# Patient Record
Sex: MALE | Race: WHITE
[De-identification: names, ages, dates, MRNs, and addresses within clinical notes are randomized per-mention and may not be internally consistent; named-entity substitution may affect disease eponyms.]

---

## 2017-03-05 ENCOUNTER — HOSPITAL ENCOUNTER (EMERGENCY)
Dept: HOSPITAL 55 - ED | Age: 52
Discharge: HOME | DRG: 192 | End: 2017-03-05
Payer: COMMERCIAL

## 2017-03-05 VITALS — SYSTOLIC BLOOD PRESSURE: 119 MMHG | DIASTOLIC BLOOD PRESSURE: 83 MMHG

## 2017-03-05 VITALS — TEMPERATURE: 99.1 F | RESPIRATION RATE: 22 BRPM

## 2017-03-05 VITALS — OXYGEN SATURATION: 91 % | HEART RATE: 108 BPM

## 2017-03-05 DIAGNOSIS — Z72.0: ICD-10-CM

## 2017-03-05 DIAGNOSIS — J44.1: Primary | ICD-10-CM

## 2017-03-05 PROCEDURE — 71020: CPT

## 2017-03-05 PROCEDURE — 99284 EMERGENCY DEPT VISIT MOD MDM: CPT

## 2017-04-03 ENCOUNTER — HOSPITAL ENCOUNTER (EMERGENCY)
Dept: HOSPITAL 55 - ED | Age: 52
Discharge: ANOTHER HOSPITAL | DRG: 310 | End: 2017-04-03
Payer: COMMERCIAL

## 2017-04-03 VITALS — TEMPERATURE: 99.5 F

## 2017-04-03 VITALS — OXYGEN SATURATION: 93 % | HEART RATE: 150 BPM | DIASTOLIC BLOOD PRESSURE: 52 MMHG | SYSTOLIC BLOOD PRESSURE: 109 MMHG

## 2017-04-03 VITALS — RESPIRATION RATE: 28 BRPM

## 2017-04-03 DIAGNOSIS — I47.1: Primary | ICD-10-CM

## 2017-04-03 DIAGNOSIS — J44.9: ICD-10-CM

## 2017-04-03 DIAGNOSIS — N28.9: ICD-10-CM

## 2017-04-03 DIAGNOSIS — R09.02: ICD-10-CM

## 2017-04-03 LAB
ALBUMIN SERPL-MCNC: 2.7 GM/DL (ref 3.4–5)
ALT SERPL-CCNC: 34 IU/L (ref 14–63)
AMPHETAMINES UR-MCNC: NEGATIVE UG/L
APPEARANCE UR: (no result)
BASOPHILS NFR BLD AUTO: 2 % (ref 0–3)
BILIRUB UR QL STRIP: (no result)
CALCIUM SERPL-MCNC: 8.4 MG/DL (ref 8.5–10.1)
COLOR,URINE: (no result)
EOSINOPHIL NFR BLD AUTO: 0 % (ref 0–9)
GFR SERPLBLD BASED ON 1.73 SQ M-ARVRAT: 28 ML/MIN (ref 60–?)
GLUCOSE, URINE (UA): NEGATIVE
HCT VFR BLD CALC: 63 % (ref 39–53)
ICTOTEST,URINE: POSITIVE
KETONES UR QL STRIP: (no result)
LEUKOCYTE ESTERASE UR QL STRIP: (no result)
LEUKOCYTE ESTERASE UR QL STRIP: NEGATIVE
MCHC RBC AUTO-ENTMCNC: 30.4 GM/DL (ref 32–36)
MCV RBC AUTO: 96 FL (ref 80–100)
METHADONE SERPL-MCNC: NEGATIVE NG/ML
MONOCYTES NFR BLD AUTO: 20.7 % (ref 0–12)
NEUTROPHILS NFR BLD AUTO: 71 % (ref 37–80)
NITRATE,URINE: NEGATIVE
OPIATES SPEC QL: NEGATIVE
OXYCODONE(OXY): NEGATIVE
PH UR STRIP: 5 [PH]
POTASSIUM SERPL-SCNC: 5.4 MMOL/L (ref 3.5–5.1)
PROPOXYPHENE(PPX): NEGATIVE
RBC # UR STRIP: (no result) /UL
RBC UR QL: NEGATIVE
SODIUM SERPL-SCNC: 134 MMOL/L (ref 136–145)
SP GR UR STRIP: >1.03
TRICYCLICS SERPL-MCNC: NEGATIVE UG/L
UROBILINOGEN,URINE: 1

## 2017-04-03 PROCEDURE — 87088 URINE BACTERIA CULTURE: CPT

## 2017-04-03 PROCEDURE — 80305 DRUG TEST PRSMV DIR OPT OBS: CPT

## 2017-04-03 PROCEDURE — 96365 THER/PROPH/DIAG IV INF INIT: CPT

## 2017-04-03 PROCEDURE — 99285 EMERGENCY DEPT VISIT HI MDM: CPT

## 2017-04-03 PROCEDURE — 84484 ASSAY OF TROPONIN QUANT: CPT

## 2017-04-03 PROCEDURE — 85025 COMPLETE CBC W/AUTO DIFF WBC: CPT

## 2017-04-03 PROCEDURE — 96374 THER/PROPH/DIAG INJ IV PUSH: CPT

## 2017-04-03 PROCEDURE — 96375 TX/PRO/DX INJ NEW DRUG ADDON: CPT

## 2017-04-03 PROCEDURE — 81001 URINALYSIS AUTO W/SCOPE: CPT

## 2017-04-03 PROCEDURE — 83880 ASSAY OF NATRIURETIC PEPTIDE: CPT

## 2017-04-03 PROCEDURE — 93005 ELECTROCARDIOGRAM TRACING: CPT

## 2017-04-03 PROCEDURE — 96366 THER/PROPH/DIAG IV INF ADDON: CPT

## 2017-04-03 PROCEDURE — 80053 COMPREHEN METABOLIC PANEL: CPT

## 2017-04-03 PROCEDURE — 71010: CPT

## 2017-04-03 PROCEDURE — 99291 CRITICAL CARE FIRST HOUR: CPT

## 2018-01-01 ENCOUNTER — HOSPITAL ENCOUNTER (EMERGENCY)
Dept: HOSPITAL 55 - ED | Age: 53
Discharge: HOME | DRG: 192 | End: 2018-01-01
Payer: COMMERCIAL

## 2018-01-01 VITALS — SYSTOLIC BLOOD PRESSURE: 119 MMHG | TEMPERATURE: 98.7 F | DIASTOLIC BLOOD PRESSURE: 77 MMHG

## 2018-01-01 VITALS — OXYGEN SATURATION: 89 % | RESPIRATION RATE: 22 BRPM | HEART RATE: 110 BPM

## 2018-01-01 DIAGNOSIS — R33.9: ICD-10-CM

## 2018-01-01 DIAGNOSIS — J44.1: Primary | ICD-10-CM

## 2018-01-01 LAB
BILIRUB UR QL STRIP: NEGATIVE
GLUCOSE, URINE (UA): NEGATIVE
KETONES UR QL STRIP: NEGATIVE
LEUKOCYTE ESTERASE UR QL STRIP: (no result)
LEUKOCYTE ESTERASE UR QL STRIP: NEGATIVE
NITRATE,URINE: NEGATIVE
PH UR STRIP: 5 [PH]
RBC # UR STRIP: (no result) /UL
RBC UR QL: (no result)
SP GR UR STRIP: 1.02
UROBILINOGEN,URINE: 0.2

## 2018-01-01 PROCEDURE — 99283 EMERGENCY DEPT VISIT LOW MDM: CPT

## 2018-01-01 PROCEDURE — 81001 URINALYSIS AUTO W/SCOPE: CPT

## 2018-01-21 ENCOUNTER — HOSPITAL ENCOUNTER (EMERGENCY)
Dept: HOSPITAL 55 - ED | Age: 53
Discharge: ANOTHER HOSPITAL | DRG: 310 | End: 2018-01-21
Payer: COMMERCIAL

## 2018-01-21 VITALS
RESPIRATION RATE: 18 BRPM | OXYGEN SATURATION: 91 % | HEART RATE: 159 BPM | DIASTOLIC BLOOD PRESSURE: 61 MMHG | SYSTOLIC BLOOD PRESSURE: 89 MMHG

## 2018-01-21 VITALS — TEMPERATURE: 97 F

## 2018-01-21 DIAGNOSIS — I95.9: ICD-10-CM

## 2018-01-21 DIAGNOSIS — I47.1: Primary | ICD-10-CM

## 2018-01-21 LAB
ALBUMIN SERPL-MCNC: 2.6 GM/DL (ref 3.4–5)
ALT SERPL-CCNC: 36 IU/L (ref 14–63)
BASOPHILS NFR BLD AUTO: 1 % (ref 0–3)
CALCIUM SERPL-MCNC: 8.4 MG/DL (ref 8.5–10.1)
EOSINOPHIL NFR BLD AUTO: 1 % (ref 0–9)
GFR SERPLBLD BASED ON 1.73 SQ M-ARVRAT: 69 ML/MIN (ref 60–?)
HCT VFR BLD CALC: 63 % (ref 39–53)
MCHC RBC AUTO-ENTMCNC: 29.8 GM/DL (ref 32–36)
MCV RBC AUTO: 97 FL (ref 80–100)
MONOCYTES NFR BLD AUTO: 15.8 % (ref 0–12)
NEUTROPHILS NFR BLD AUTO: 71 % (ref 37–80)
POTASSIUM SERPL-SCNC: 4.8 MMOL/L (ref 3.5–5.1)
SODIUM SERPL-SCNC: 139 MMOL/L (ref 136–145)

## 2018-01-21 PROCEDURE — 93005 ELECTROCARDIOGRAM TRACING: CPT

## 2018-01-21 PROCEDURE — 85025 COMPLETE CBC W/AUTO DIFF WBC: CPT

## 2018-01-21 PROCEDURE — 84484 ASSAY OF TROPONIN QUANT: CPT

## 2018-01-21 PROCEDURE — 71045 X-RAY EXAM CHEST 1 VIEW: CPT

## 2018-01-21 PROCEDURE — 83880 ASSAY OF NATRIURETIC PEPTIDE: CPT

## 2018-01-21 PROCEDURE — 99291 CRITICAL CARE FIRST HOUR: CPT

## 2018-01-21 PROCEDURE — 80053 COMPREHEN METABOLIC PANEL: CPT

## 2018-08-02 ENCOUNTER — NURSE TRIAGE (OUTPATIENT)
Dept: NURSING | Facility: CLINIC | Age: 53
End: 2018-08-02

## 2018-08-03 ENCOUNTER — OFFICE VISIT (OUTPATIENT)
Dept: URGENT CARE | Facility: URGENT CARE | Age: 53
End: 2018-08-03
Payer: COMMERCIAL

## 2018-08-03 VITALS
DIASTOLIC BLOOD PRESSURE: 81 MMHG | WEIGHT: 143 LBS | TEMPERATURE: 97.5 F | SYSTOLIC BLOOD PRESSURE: 116 MMHG | OXYGEN SATURATION: 89 % | RESPIRATION RATE: 18 BRPM | HEART RATE: 110 BPM

## 2018-08-03 DIAGNOSIS — R06.02 SOB (SHORTNESS OF BREATH): Primary | ICD-10-CM

## 2018-08-03 PROCEDURE — 99203 OFFICE O/P NEW LOW 30 MIN: CPT | Mod: 25 | Performed by: FAMILY MEDICINE

## 2018-08-03 PROCEDURE — 94640 AIRWAY INHALATION TREATMENT: CPT | Performed by: FAMILY MEDICINE

## 2018-08-03 RX ORDER — AZITHROMYCIN 250 MG/1
TABLET, FILM COATED ORAL
Qty: 6 TABLET | Refills: 0 | Status: SHIPPED | OUTPATIENT
Start: 2018-08-03 | End: 2018-08-03

## 2018-08-03 RX ORDER — TIOTROPIUM BROMIDE 18 UG/1
CAPSULE ORAL; RESPIRATORY (INHALATION)
COMMUNITY
Start: 2015-12-23 | End: 2021-03-24

## 2018-08-03 RX ORDER — ALBUTEROL SULFATE 90 UG/1
AEROSOL, METERED RESPIRATORY (INHALATION)
COMMUNITY
Start: 2015-12-23

## 2018-08-03 RX ORDER — LISINOPRIL 10 MG/1
10 TABLET ORAL
COMMUNITY
Start: 2017-04-17

## 2018-08-03 RX ORDER — AZITHROMYCIN 250 MG/1
TABLET, FILM COATED ORAL
Qty: 6 TABLET | Refills: 0 | Status: SHIPPED | OUTPATIENT
Start: 2018-08-03

## 2018-08-03 RX ORDER — ATORVASTATIN CALCIUM 20 MG/1
20 TABLET, FILM COATED ORAL
COMMUNITY
Start: 2015-08-20

## 2018-08-03 RX ORDER — PREDNISONE 20 MG/1
40 TABLET ORAL DAILY
Qty: 10 TABLET | Refills: 0 | Status: SHIPPED | OUTPATIENT
Start: 2018-08-03 | End: 2018-08-08

## 2018-08-03 NOTE — TELEPHONE ENCOUNTER
Says he is visiting from out of town and does not have enough with him. I advised that he contact the company that provides his oxygen. He verbalized understanding..  Juani Shea RN  [facility]

## 2018-08-03 NOTE — PROGRESS NOTES
SUBJECTIVE:   Miguel Rivera is a 53 year old male presenting with a chief complaint of   Chief Complaint   Patient presents with     Urgent Care     Cold Symptoms     congestion, some sob, nausea, tiredness x1 week visiting from out of town, needs refill on some medications      The history of COPD seems to be getting worse over the last 4 days using his medication more.  No fever no chills but there is shortness of breath    Difficult to walk without some shortness of breath  He is a new patient of [facility].    URI Adult    Onset of symptoms was 1 week(s) ago.  Course of illness is worsening.    Severity moderate  Current and Associated symptoms: fever and chills  Treatment measures tried include Tylenol/Ibuprofen.  Predisposing factors include None.        Review of Systems   Respiratory: Positive for cough and shortness of breath.    All other systems reviewed and are negative.      No past medical history on file.  No family history on file.  Current Outpatient Prescriptions   Medication Sig Dispense Refill     albuterol (VENTOLIN HFA) 108 (90 Base) MCG/ACT Inhaler INHALE TWO PUFFS BY MOUTH EVERY 4 HOURS AS NEEDED FOR WHEEZING       atorvastatin (LIPITOR) 20 MG tablet Take 20 mg by mouth       Budesonide-Formoterol Fumarate (SYMBICORT IN)        fluticasone-salmeterol (ADVAIR DISKUS) 100-50 MCG/DOSE diskus inhaler Inhale 1 puff into the lungs every 12 hours       Furosemide (LASIX PO)        lisinopril (PRINIVIL/ZESTRIL) 10 MG tablet Take 10 mg by mouth       metFORMIN (GLUCOPHAGE) 500 MG tablet Take 500 mg by mouth       mometasone-formoterol (DULERA) 200-5 MCG/ACT oral inhaler Inhale two puffs twice a day- RINSE MOUTH after DOSE.       tiotropium (SPIRIVA HANDIHALER) 18 MCG capsule INHALE ONE DOSE BY MOUTH ONCE DAILY       Social History   Substance Use Topics     Smoking status: Current Every Day Smoker     Packs/day: 1.00     Types: Cigarettes     Smokeless tobacco: Never Used     Alcohol use Not on file        OBJECTIVE  /81  Pulse 110  Temp 97.5  F (36.4  C) (Oral)  Resp 18  Wt 143 lb (64.9 kg)  SpO2 (!) 89%    Physical Exam   Constitutional: He is oriented to person, place, and time.   HENT:   Head: Normocephalic.   Eyes: EOM are normal. Pupils are equal, round, and reactive to light.   Cardiovascular: Normal rate.    Pulmonary/Chest: Effort normal.   Shortness of breath and low oxygen tension.   Abdominal: Soft.   Musculoskeletal: Normal range of motion.   Neurological: He is alert and oriented to person, place, and time.   Skin: Skin is warm.   Psychiatric: He has a normal mood and affect.   Nursing note and vitals reviewed.      Labs:  No results found for this or any previous visit (from the past 24 hour(s)).    X-Ray was not done.    ASSESSMENT:      ICD-10-CM    1. SOB (shortness of breath) R06.02 INHALATION/NEBULIZER TREATMENT, INITIAL    This appears to be exacerbation of COPD.Oxygen saturations are low.     He did seem to improve after nebulizing treatments his O2 saturations were greater than 90%.          Medical Decision Making:    Differential Diagnosis:  IsURI Adult/Peds:  Bronchospasm, COPD, pneumonia , bronchitis    Serious Comorbid Conditions:   Adult:  COPD.        PLAN:    URI Adult:  Tylenol and Ibuprofen    Followup:    Should be seen by his primary care within the next week    sooner if not improving  There are no Patient Instructions on file for this visit.

## 2018-08-03 NOTE — NURSING NOTE
The following nebulizer treatment was given:     MEDICATION: Duoneb  : [employer]  LOT #: [id number]  EXPIRATION DATE:  10/2019  NDC # 3032-0913-45     Nebulizer Start Time:  410  Nebulizer Stop Time:  425  .Yolie ELIZABETH MA    See Vital Signs Flowsheet

## 2018-08-03 NOTE — MR AVS SNAPSHOT
"              After Visit Summary   8/3/2018    Miguel Rivera    MRN: [id number]           Patient Information     Date Of Birth          1965        Visit Information        Provider Department      8/3/2018 3:20 PM Albert Fofana MD [facility]        Today's Diagnoses     SOB (shortness of breath)    -  1       Follow-ups after your visit        Who to contact     If you have questions or need follow up information about today's clinic visit or your schedule please contact [facility] directly at [phone].  Normal or non-critical lab and imaging results will be communicated to you by [employer], letter or phone within 4 business days after the clinic has received the results. If you do not hear from us within 7 days, please contact the clinic through [employer] or phone. If you have a critical or abnormal lab result, we will notify you by phone as soon as possible.  Submit refill requests through [employer] or call your pharmacy and they will forward the refill request to us. Please allow 3 business days for your refill to be completed.          Additional Information About Your Visit        MyChart Information     [employer] lets you send messages to your doctor, view your test results, renew your prescriptions, schedule appointments and more. To sign up, go to [email or web address] . Click on \"Log in\" on the left side of the screen, which will take you to the Welcome page. Then click on \"Sign up Now\" on the right side of the page.     You will be asked to enter the access code listed below, as well as some personal information. Please follow the directions to create your username and password.     Your access code is: [email or web address]  Expires: 2018  8:30 PM     Your access code will  in 90 days. If you need help or a new code, please call your [facility] clinic or [phone].        Care EveryWhere ID     This is your Care EveryWhere ID. This " could be used by other organizations to access your [place] medical records  [id number]        Your Vitals Were     Pulse Temperature Respirations Pulse Oximetry          110 97.5  F (36.4  C) (Oral) 18 89%         Blood Pressure from Last 3 Encounters:   08/03/18 116/81   03/11/14 140/90    Weight from Last 3 Encounters:   08/03/18 143 lb (64.9 kg)   03/11/14 165 lb (74.8 kg)              We Performed the Following     INHALATION/NEBULIZER TREATMENT, INITIAL          Today's Medication Changes          These changes are accurate as of 8/3/18 11:59 PM.  If you have any questions, ask your nurse or doctor.               Start taking these medicines.        Dose/Directions    azithromycin 250 MG tablet   Commonly known as:  ZITHROMAX   Used for:  SOB (shortness of breath)   Started by:  Albert Fofana MD        Two tablets first day, then one tablet daily for four days.   Quantity:  6 tablet   Refills:  0       predniSONE 20 MG tablet   Commonly known as:  DELTASONE   Used for:  SOB (shortness of breath)   Started by:  Albert Fofana MD        Dose:  40 mg   Take 2 tablets (40 mg) by mouth daily for 5 days   Quantity:  10 tablet   Refills:  0            Where to get your medicines      These medications were sent to [facility] [address] [place] [address]     Phone:  [phone]     azithromycin 250 MG tablet    predniSONE 20 MG tablet                Primary Care Provider Fax #    Physician No Ref-Primary [phone]       No address on file        Equal Access to Services     BRISEYDA ALVARADO : Dania Kiran, waaxda luqadaha, qaybta kaalmada adeegyada, waxgavin jonathan diaz. So [facility] [phone].    ATENCIÓN: Si habla español, tiene a galvez disposición servicios gratuitos de asistencia lingüística. Llame al [phone].    We comply with applicable federal civil rights laws and [place] laws. We do not  discriminate on the basis of race, color, national origin, age, disability, sex, sexual orientation, or gender identity.            Thank you!     Thank you for choosing [facility]  for your care. Our goal is always to provide you with excellent care. Hearing back from our patients is one way we can continue to improve our services. Please take a few minutes to complete the written survey that you may receive in the mail after your visit with us. Thank you!             Your Updated Medication List - Protect others around you: Learn how to safely use, store and throw away your medicines at www.disposemymeds.org.          This list is accurate as of 8/3/18 11:59 PM.  Always use your most recent med list.                   Brand Name Dispense Instructions for use Diagnosis    ADVAIR DISKUS 100-50 MCG/DOSE diskus inhaler   Generic drug:  fluticasone-salmeterol      Inhale 1 puff into the lungs every 12 hours        atorvastatin 20 MG tablet    LIPITOR     Take 20 mg by mouth        azithromycin 250 MG tablet    ZITHROMAX    6 tablet    Two tablets first day, then one tablet daily for four days.    SOB (shortness of breath)       LASIX PO           lisinopril 10 MG tablet    PRINIVIL/ZESTRIL     Take 10 mg by mouth        metFORMIN 500 MG tablet    GLUCOPHAGE     Take 500 mg by mouth        mometasone-formoterol 200-5 MCG/ACT oral inhaler    DULERA     Inhale two puffs twice a day- RINSE MOUTH after DOSE.        predniSONE 20 MG tablet    DELTASONE    10 tablet    Take 2 tablets (40 mg) by mouth daily for 5 days    SOB (shortness of breath)       SPIRIVA HANDIHALER 18 MCG capsule   Generic drug:  tiotropium      INHALE ONE DOSE BY MOUTH ONCE DAILY        SYMBICORT IN           VENTOLIN  (90 Base) MCG/ACT inhaler   Generic drug:  albuterol      INHALE TWO PUFFS BY MOUTH EVERY 4 HOURS AS NEEDED FOR WHEEZING

## 2018-08-16 ASSESSMENT — ENCOUNTER SYMPTOMS
COUGH: 1
SHORTNESS OF BREATH: 1

## 2019-01-12 ENCOUNTER — HOSPITAL ENCOUNTER (EMERGENCY)
Dept: HOSPITAL 55 - ED | Age: 54
Discharge: ANOTHER HOSPITAL | DRG: 191 | End: 2019-01-12
Payer: COMMERCIAL

## 2019-01-12 VITALS
SYSTOLIC BLOOD PRESSURE: 105 MMHG | DIASTOLIC BLOOD PRESSURE: 70 MMHG | RESPIRATION RATE: 19 BRPM | OXYGEN SATURATION: 87 % | HEART RATE: 100 BPM

## 2019-01-12 VITALS — TEMPERATURE: 96.8 F

## 2019-01-12 DIAGNOSIS — E11.9: ICD-10-CM

## 2019-01-12 DIAGNOSIS — J44.1: Primary | ICD-10-CM

## 2019-01-12 DIAGNOSIS — R06.02: ICD-10-CM

## 2019-01-12 DIAGNOSIS — E87.2: ICD-10-CM

## 2019-01-12 LAB
ALBUMIN SERPL-MCNC: 3.1 GM/DL (ref 3.4–5)
ALCOHOL: 0.01 GM/DL (ref 0–0.08)
ALP SERPL-CCNC: 88 IU/L (ref 46–116)
ALT SERPL-CCNC: 42 IU/L (ref 14–63)
ANISOCYTOSIS BLD QL SMEAR: (no result)
AST SERPL-CCNC: 27 IU/L (ref 15–37)
BASOPHILS NFR BLD AUTO: 3 % (ref 0–3)
BILIRUB SERPL-MCNC: 0.7 MG/DL (ref 0.2–1)
BUN SERPL-MCNC: 31 MG/DL (ref 7–18)
CALCIUM SERPL-MCNC: 8.7 MG/DL (ref 8.5–10.1)
CHLORIDE SERPL-SCNC: 93 MMOL/L (ref 98–107)
CO2 BLDA CALC-SCNC: > 50 MEQ/L (ref 23–30)
CO2 BLDA CALC-SCNC: > 50 MEQ/L (ref 23–30)
CO2 SERPL-SCNC: 49 MEQ/L (ref 21–32)
CREAT SERPL-MCNC: 0.83 MG/DL (ref 0.8–1.3)
EOSINOPHIL NFR BLD AUTO: 0 % (ref 0–9)
GLUCOSE SERPL-MCNC: 156 MG/DL (ref 74–106)
HCO3 BLDA-SCNC: 48 MEQ/L (ref 23–30)
HCO3 BLDA-SCNC: 49 MEQ/L (ref 23–30)
HCT VFR BLD CALC: 69 % (ref 39–53)
HGB BLD-MCNC: 19.5 GM/DL (ref 13.5–17.7)
LYMPHOCYTES NFR BLD AUTO: 7.1 % (ref 10–50)
MACROCYTES BLD QL SMEAR: PRESENT
MCH RBC QN AUTO: 30 PG (ref 27–32)
MCHC RBC AUTO-ENTMCNC: 28.3 GM/DL (ref 32–36)
MCV RBC AUTO: 106 FL (ref 80–100)
MONOCYTES NFR BLD AUTO: 16.9 % (ref 0–12)
NEUTROPHILS NFR BLD AUTO: 73.1 % (ref 37–80)
PCO2 BLDA: 125 MMHG (ref 35–45)
PCO2 BLDA: 133 MMHG (ref 35–45)
PH BLDA: 7.17 [PH] (ref 7.35–7.45)
PH BLDA: 7.19 [PH] (ref 7.35–7.45)
PO2 BLDA: 100 MMHG (ref 80–100)
PO2 BLDA: 65 MMHG (ref 80–100)
POTASSIUM SERPL-SCNC: 4.9 MMOL/L (ref 3.5–5.1)
PROT SERPL-MCNC: 6.6 GM/DL (ref 6.4–8.2)
SAO2 % BLDA FROM PO2: 84 % (ref 96–100)
SAO2 % BLDA FROM PO2: 94 % (ref 96–100)
SODIUM SERPL-SCNC: 139 MMOL/L (ref 136–145)
STOMATOCYTES BLD QL SMEAR: PRESENT
TROPONIN I SERPL-MCNC: < 0.017 NG/ML (ref 0–0.06)

## 2019-01-12 PROCEDURE — 96365 THER/PROPH/DIAG IV INF INIT: CPT

## 2019-01-12 PROCEDURE — 36600 WITHDRAWAL OF ARTERIAL BLOOD: CPT

## 2019-01-12 PROCEDURE — 80053 COMPREHEN METABOLIC PANEL: CPT

## 2019-01-12 PROCEDURE — 80307 DRUG TEST PRSMV CHEM ANLYZR: CPT

## 2019-01-12 PROCEDURE — 82803 BLOOD GASES ANY COMBINATION: CPT

## 2019-01-12 PROCEDURE — 93005 ELECTROCARDIOGRAM TRACING: CPT

## 2019-01-12 PROCEDURE — 71045 X-RAY EXAM CHEST 1 VIEW: CPT

## 2019-01-12 PROCEDURE — 85025 COMPLETE CBC W/AUTO DIFF WBC: CPT

## 2019-01-12 PROCEDURE — 96374 THER/PROPH/DIAG INJ IV PUSH: CPT

## 2019-01-12 PROCEDURE — 99291 CRITICAL CARE FIRST HOUR: CPT

## 2019-01-12 PROCEDURE — 99284 EMERGENCY DEPT VISIT MOD MDM: CPT

## 2019-01-12 PROCEDURE — 84484 ASSAY OF TROPONIN QUANT: CPT

## 2019-01-12 PROCEDURE — 83880 ASSAY OF NATRIURETIC PEPTIDE: CPT

## 2019-06-04 ENCOUNTER — HOSPITAL ENCOUNTER (INPATIENT)
Dept: HOSPITAL 55 - ED | Age: 54
LOS: 1 days | Discharge: ANOTHER HOSPITAL | DRG: 191 | End: 2019-06-05
Attending: FAMILY MEDICINE | Admitting: FAMILY MEDICINE
Payer: COMMERCIAL

## 2019-06-04 DIAGNOSIS — E87.2: ICD-10-CM

## 2019-06-04 DIAGNOSIS — R06.02: ICD-10-CM

## 2019-06-04 DIAGNOSIS — J44.1: Primary | ICD-10-CM

## 2019-06-04 DIAGNOSIS — E11.9: ICD-10-CM

## 2019-06-04 LAB
ALBUMIN SERPL-MCNC: 3.2 GM/DL (ref 3.4–5)
ALP SERPL-CCNC: 86 IU/L (ref 46–116)
ALT SERPL-CCNC: 44 IU/L (ref 14–63)
ANISOCYTOSIS BLD QL SMEAR: (no result)
AST SERPL-CCNC: 24 IU/L (ref 15–37)
BASOPHILS NFR BLD MANUAL: 0 % (ref 0–3)
BILIRUB SERPL-MCNC: 0.5 MG/DL (ref 0.2–1)
BUN SERPL-MCNC: 28 MG/DL (ref 7–18)
CALCIUM SERPL-MCNC: 9.1 MG/DL (ref 8.5–10.1)
CHLORIDE SERPL-SCNC: 93 MMOL/L (ref 98–107)
CO2 BLDA CALC-SCNC: 47 MEQ/L (ref 23–30)
CO2 SERPL-SCNC: 44 MEQ/L (ref 21–32)
CREAT SERPL-MCNC: 0.52 MG/DL (ref 0.8–1.3)
EOSINOPHIL NFR BLD MANUAL: 0 % (ref 0–9)
GLUCOSE SERPL-MCNC: 125 MG/DL (ref 74–106)
HCO3 BLDA-SCNC: 44 MEQ/L (ref 23–30)
HCT VFR BLD CALC: 65 % (ref 39–53)
HGB BLD-MCNC: 19.7 GM/DL (ref 13.5–17.7)
MACROCYTES BLD QL SMEAR: PRESENT
MCH RBC QN AUTO: 31.1 PG (ref 27–32)
MCHC RBC AUTO-ENTMCNC: 30.4 GM/DL (ref 32–36)
MCV RBC AUTO: 102 FL (ref 80–100)
MONOCYTES % (MANUAL): 9 % (ref 0–12)
NEUTS BAND NFR BLD MANUAL: 0 %
NEUTS BAND NFR BLD MANUAL: 89 % (ref 37–80)
PCO2 BLDA: 102 MMHG (ref 35–45)
PH BLDA: 7.24 [PH] (ref 7.35–7.45)
PO2 BLDA: 61 MMHG (ref 80–100)
POTASSIUM SERPL-SCNC: 4.8 MMOL/L (ref 3.5–5.1)
PROT SERPL-MCNC: 7.1 GM/DL (ref 6.4–8.2)
SAO2 % BLDA FROM PO2: 84 % (ref 96–100)
SODIUM SERPL-SCNC: 135 MMOL/L (ref 136–145)
TROPONIN I SERPL-MCNC: < 0.017 NG/ML (ref 0–0.06)
VARIANT LYMPHS NFR BLD MANUAL: 2 % (ref 10–50)

## 2019-06-04 PROCEDURE — 94762 N-INVAS EAR/PLS OXIMTRY CONT: CPT

## 2019-06-04 PROCEDURE — 85025 COMPLETE CBC W/AUTO DIFF WBC: CPT

## 2019-06-04 PROCEDURE — 82803 BLOOD GASES ANY COMBINATION: CPT

## 2019-06-04 PROCEDURE — 99285 EMERGENCY DEPT VISIT HI MDM: CPT

## 2019-06-04 PROCEDURE — 0CHY7BZ INSERTION OF AIRWAY INTO MOUTH AND THROAT, VIA NATURAL OR ARTIFICIAL OPENING: ICD-10-PCS

## 2019-06-04 PROCEDURE — 84484 ASSAY OF TROPONIN QUANT: CPT

## 2019-06-04 PROCEDURE — 85007 BL SMEAR W/DIFF WBC COUNT: CPT

## 2019-06-04 PROCEDURE — 85027 COMPLETE CBC AUTOMATED: CPT

## 2019-06-04 PROCEDURE — 80048 BASIC METABOLIC PNL TOTAL CA: CPT

## 2019-06-04 PROCEDURE — 31500 INSERT EMERGENCY AIRWAY: CPT

## 2019-06-04 PROCEDURE — 71045 X-RAY EXAM CHEST 1 VIEW: CPT

## 2019-06-04 PROCEDURE — 99222 1ST HOSP IP/OBS MODERATE 55: CPT

## 2019-06-04 PROCEDURE — 82962 GLUCOSE BLOOD TEST: CPT

## 2019-06-04 PROCEDURE — 80053 COMPREHEN METABOLIC PANEL: CPT

## 2019-06-04 PROCEDURE — 93012: CPT

## 2019-06-04 PROCEDURE — 94640 AIRWAY INHALATION TREATMENT: CPT

## 2019-06-04 PROCEDURE — 94660 CPAP INITIATION&MGMT: CPT

## 2019-06-04 PROCEDURE — 93005 ELECTROCARDIOGRAM TRACING: CPT

## 2019-06-04 PROCEDURE — A9270 NON-COVERED ITEM OR SERVICE: HCPCS

## 2019-06-04 PROCEDURE — 36600 WITHDRAWAL OF ARTERIAL BLOOD: CPT

## 2019-06-04 PROCEDURE — 36415 COLL VENOUS BLD VENIPUNCTURE: CPT

## 2019-06-05 VITALS
OXYGEN SATURATION: 98 % | RESPIRATION RATE: 11 BRPM | HEART RATE: 97 BPM | SYSTOLIC BLOOD PRESSURE: 97 MMHG | DIASTOLIC BLOOD PRESSURE: 67 MMHG

## 2019-06-05 VITALS — TEMPERATURE: 95.3 F

## 2019-06-05 LAB
ANISOCYTOSIS BLD QL SMEAR: SLIGHT
BASOPHILS NFR BLD AUTO: 0 % (ref 0–3)
BUN SERPL-MCNC: 29 MG/DL (ref 7–18)
CALCIUM SERPL-MCNC: 9 MG/DL (ref 8.5–10.1)
CHLORIDE SERPL-SCNC: 93 MMOL/L (ref 98–107)
CO2 BLDA CALC-SCNC: 46 MEQ/L (ref 23–30)
CO2 SERPL-SCNC: 44.2 MEQ/L (ref 21–32)
CREAT SERPL-MCNC: 0.87 MG/DL (ref 0.8–1.3)
EOSINOPHIL NFR BLD AUTO: 0 % (ref 0–9)
GLUCOSE SERPL-MCNC: 245 MG/DL (ref 74–106)
HCO3 BLDA-SCNC: 43 MEQ/L (ref 23–30)
HCT VFR BLD CALC: 65 % (ref 39–53)
HGB BLD-MCNC: 19 GM/DL (ref 13.5–17.7)
LYMPHOCYTES NFR BLD AUTO: 1.3 % (ref 10–50)
MACROCYTES BLD QL SMEAR: PRESENT
MCH RBC QN AUTO: 29.9 PG (ref 27–32)
MCHC RBC AUTO-ENTMCNC: 29 GM/DL (ref 32–36)
MCV RBC AUTO: 103 FL (ref 80–100)
MONOCYTES NFR BLD AUTO: 0.8 % (ref 0–12)
NEUTROPHILS NFR BLD AUTO: 97.4 % (ref 37–80)
PCO2 BLDA: 98 MMHG (ref 35–45)
PH BLDA: 7.25 [PH] (ref 7.35–7.45)
PO2 BLDA: 65 MMHG (ref 80–100)
POTASSIUM SERPL-SCNC: 5.4 MMOL/L (ref 3.5–5.1)
SAO2 % BLDA FROM PO2: 86 % (ref 96–100)
SODIUM SERPL-SCNC: 135 MMOL/L (ref 136–145)

## 2021-03-24 ENCOUNTER — ANCILLARY PROCEDURE (OUTPATIENT)
Dept: GENERAL RADIOLOGY | Facility: CLINIC | Age: 56
End: 2021-03-24
Attending: PHYSICIAN ASSISTANT
Payer: COMMERCIAL

## 2021-03-24 ENCOUNTER — OFFICE VISIT (OUTPATIENT)
Dept: URGENT CARE | Facility: URGENT CARE | Age: 56
End: 2021-03-24
Payer: COMMERCIAL

## 2021-03-24 VITALS
OXYGEN SATURATION: 94 % | HEART RATE: 118 BPM | RESPIRATION RATE: 16 BRPM | TEMPERATURE: 98.3 F | DIASTOLIC BLOOD PRESSURE: 81 MMHG | SYSTOLIC BLOOD PRESSURE: 137 MMHG

## 2021-03-24 DIAGNOSIS — R05.9 COUGH: Primary | ICD-10-CM

## 2021-03-24 DIAGNOSIS — J18.9 PNEUMONIA DUE TO INFECTIOUS ORGANISM, UNSPECIFIED LATERALITY, UNSPECIFIED PART OF LUNG: ICD-10-CM

## 2021-03-24 LAB
SARS-COV-2 RNA RESP QL NAA+PROBE: NORMAL
SPECIMEN SOURCE: NORMAL

## 2021-03-24 PROCEDURE — 71046 X-RAY EXAM CHEST 2 VIEWS: CPT | Performed by: RADIOLOGY

## 2021-03-24 PROCEDURE — U0003 INFECTIOUS AGENT DETECTION BY NUCLEIC ACID (DNA OR RNA); SEVERE ACUTE RESPIRATORY SYNDROME CORONAVIRUS 2 (SARS-COV-2) (CORONAVIRUS DISEASE [COVID-19]), AMPLIFIED PROBE TECHNIQUE, MAKING USE OF HIGH THROUGHPUT TECHNOLOGIES AS DESCRIBED BY CMS-2020-01-R: HCPCS | Performed by: PHYSICIAN ASSISTANT

## 2021-03-24 PROCEDURE — U0005 INFEC AGEN DETEC AMPLI PROBE: HCPCS | Performed by: PHYSICIAN ASSISTANT

## 2021-03-24 PROCEDURE — 99214 OFFICE O/P EST MOD 30 MIN: CPT | Performed by: PHYSICIAN ASSISTANT

## 2021-03-24 RX ORDER — TIOTROPIUM BROMIDE 18 UG/1
CAPSULE ORAL; RESPIRATORY (INHALATION)
Qty: 1 CAPSULE | Refills: 0 | Status: SHIPPED | OUTPATIENT
Start: 2021-03-24

## 2021-03-24 RX ORDER — AZITHROMYCIN 250 MG/1
TABLET, FILM COATED ORAL
Qty: 6 TABLET | Refills: 0 | Status: SHIPPED | OUTPATIENT
Start: 2021-03-24 | End: 2021-03-29

## 2021-03-24 RX ORDER — AMOXICILLIN 875 MG
875 TABLET ORAL 2 TIMES DAILY
Qty: 20 TABLET | Refills: 0 | Status: SHIPPED | OUTPATIENT
Start: 2021-03-24 | End: 2021-04-03

## 2021-03-24 ASSESSMENT — ENCOUNTER SYMPTOMS
CARDIOVASCULAR NEGATIVE: 1
FATIGUE: 1
MUSCULOSKELETAL NEGATIVE: 1
NEUROLOGICAL NEGATIVE: 1
SORE THROAT: 0
SHORTNESS OF BREATH: 0
CHEST TIGHTNESS: 0
GASTROINTESTINAL NEGATIVE: 1
HEADACHES: 0
APPETITE CHANGE: 1
COUGH: 1
DIZZINESS: 0
DIAPHORESIS: 0
CHILLS: 0
FEVER: 0

## 2021-03-24 NOTE — PATIENT INSTRUCTIONS
"  Patient Education   After Your COVID-19 (Coronavirus) Test  You have been tested for COVID-19 (coronavirus).   If you'll have surgery in the next few days, we'll let you know ahead of time if you have the virus. Please call your surgeon's office with any questions.  For all other patients: Results are usually available in Effektif within 2 to 3 days.   If you do not have a Effektif account, you'll get a letter in the mail in about 7 to 10 days.   [employer] is often the fastest way to get test results. Please sign up if you do not already have a Effektif account. See the handout Getting COVID-19 Test Results in Effektif for help.  What if my test result is positive?  If your test is positive and you have not viewed your result in Effektif, you'll get a phone call with your result. (A positive test means that you have the virus.)     Follow the tips under \"How do I self-isolate?\" below for 10 days (20 days if you have a weak immune system).    You don't need to be retested for COVID-19 before going back to school or work. As long as you're fever-free and feeling better, you can go back to school, work and other activities after waiting the 10 or 20 days.  What if I have questions after I get my results?  If you have questions about your results, please visit our testing website at [email or web address].   After 7 to 10 days, if you have not gotten your results:     Call [phone] ([phone]) and ask to speak with our COVID-19 results team.    If you're being treated at an infusion center: Call your infusion center directly.  What are the symptoms of COVID-19?  Cough, fever and trouble breathing are the most common signs of COVID-19.  Other symptoms can include new headaches, new muscle or body aches, new and unexplained fatigue (feeling very tired), chills, sore throat, congestion (stuffy or runny nose), diarrhea (loose poop), loss of taste or smell, belly pain, and nausea or vomiting " "(feeling sick to your stomach or throwing up).  You may already have symptoms of COVID-19, or they may show up later.  What should I do if I have symptoms?  If you're having surgery: Call your surgeon's office.  For all other patients: Stay home and away from others (self-isolate) until ...    You've had no fever--and no medicine that reduces fever--for 1 full day (24 hours), AND    Other symptoms have gotten better. For example, your cough or breathing has improved, AND    At least 10 days have passed since your symptoms first started.  How do I self-isolate?    Stay in your own room, even for meals. Use your own bathroom if you can.    Stay away from others in your home. No hugging, kissing or shaking hands. No visitors.    Don't go to work, school or anywhere else.    Clean \"high touch\" surfaces often (doorknobs, counters, handles). Use household cleaning spray or wipes. You'll find a full list of  on the EPA website: www.epa.gov/pesticide-registration/list-n-disinfectants-use-against-sars-cov-2.    Cover your mouth and nose with a mask or other face covering to avoid spreading germs.    Wash your hands and face often. Use soap and water.    Caregivers in these groups are at risk for severe illness due to COVID-19:  ? People 65 years and older  ? People who live in a nursing home or long-term care facility  ? People with chronic disease (lung, heart, cancer, diabetes, kidney, liver, immunologic)  ? People who have a weakened immune system, including those who:    Are in cancer treatment    Take medicine that weakens the immune system, such as corticosteroids    Had a bone marrow or organ transplant    Have an immune deficiency    Have poorly controlled HIV or AIDS    Are obese (body mass index of 40 or higher)    Smoke regularly    Caregivers should wear gloves while washing dishes, handling laundry and cleaning bedrooms and bathrooms.    Use caution when washing and drying laundry: Don't shake dirty " laundry and use the warmest water setting that you can.    For more tips on managing your health at home, go to www.cdc.gov/coronavirus/2019-ncov/downloads/10Things.pdf.  How can I take care of myself at home?  1. Get lots of rest. Drink extra fluids (unless a doctor has told you not to).  2. Take Tylenol (acetaminophen) for fever or pain. If you have liver or kidney problems, ask your family doctor if it's OK to take Tylenol.   Adults can take either:  ? 650 mg (two 325 mg pills) every 4 to 6 hours, or   ? 1,000 mg (two 500 mg pills) every 8 hours as needed.  ? Note: Don't take more than 3,000 mg in one day. Acetaminophen is found in many medicines (both prescribed and over-the-counter medicines). Read all labels to be sure you don't take too much.   For children, check the Tylenol bottle for the right dose. The dose is based on the child's age or weight.  3. If you have other health problems (like cancer, heart failure, an organ transplant or severe kidney disease): Call your specialty clinic if you don't feel better in the next 2 days.  4. Know when to call 911. Emergency warning signs include:  ? Trouble breathing or shortness of breath  ? Chest pain or pressure that doesn't go away  ? Feeling confused like you haven't felt before, or not being able to wake up  ? Bluish-colored lips or face  5. If your doctor prescribed a blood thinner medicine: Follow their instructions.  Where can I get more information?    [facility] - About COVID-19:   [email or web address]    CDC - If You're Sick: cdc.gov/coronavirus/2019-ncov/about/steps-when-sick.html    CDC - Ending Home Isolation: www.cdc.gov/coronavirus/2019-ncov/hcp/disposition-in-home-patients.html    CDC - Caring for Someone: www.cdc.gov/coronavirus/2019-ncov/if-you-are-sick/care-for-someone.html    [facility] - Interim Guidance for Hospital Discharge to Home: www.health.[facility].[place]/diseases/coronavirus/hcp/hospdischarge.pdf    [facility]  clinical trials (COVID-19 research studies): clinicalaffairs.[facility]-clinical-trials    Below are the COVID-19 hotlines at the [place] Department of Health ([facility]). Interpreters are available.  ? For health questions: Call 581-564-0518 or 1-302.439.9190 (7 a.m. to 7 p.m.)  ? For questions about schools and childcare: Call 485-879-6031 or 1-475.811.9131 (7 a.m. to 7 p.m.)    For informational purposes only. Not to replace the advice of your health care provider. Clinically reviewed by Infection Prevention and the [facility] COVID-19 Clinical Team. Copyright   2020 [facility]. All rights reserved. [employer] 558121 - Rev 11/11/20.       Patient Education     Pneumonia (Adult)  Pneumonia is an infection deep in the lungs. It is in the small air sacs (alveoli). It may be caused by a virus, fungus, or bacteria. Pneumonia caused by bacteria is often treated with an antibiotic. Severe cases may need to be treated in the hospital. Milder cases can be treated at home. Pneumonia symptoms are a lot like flu symptoms. They include fever, cough (dry or with phlegm), headache, muscle weakness, and pain. These symptoms often get worse in the first 2 days. But they often start to get better in the first week of treatment.    Home care  Follow these guidelines when caring for yourself at home:    Get plenty of rest. Don t let yourself get overly tired when you go back to your activities. Participate in activities as directed by your healthcare provider.    Stop smoking. This is the most important step you can take to help treat pneumonia. If you need help stopping smoking, talk with your healthcare provider.    Stay away from smoke and other irritants. Stay away from secondhand smoke. Don t let anyone smoke in your home.    Prevent lung infections. Ask your healthcare provider about the flu and pneumonia vaccines. Take steps to prevent colds and other lung infections.    Practice correct handwashing. Wash  your hands often with soap and water. Use hand  when you can t wash your hands. Stay away from crowds during cold and flu season.    Use pain medicine as directed. You may use acetaminophen or ibuprofen to control fever or pain, unless another medicine was prescribed. If you have chronic liver or kidney disease, talk with your healthcare provider before using these medicines. Also talk with your provider if you ve had a stomach ulcer or GI (gastrointestinal) bleeding. Don t give aspirin to a child younger than age 19 unless directed by the provider. Taking aspirin can put a child at risk for Reye syndrome. This is a rare but very serious disorder. It most often affects the brain and the liver.    Eat a light diet as needed. You may not feel like eating, so a light diet is fine. Follow the treatment plan as advised by your healthcare provider.    Drink plenty of water and fluids. This can make mucus thinner and easier to cough up. Ask your healthcare provider how much water you should drink. For many people, 6 to 8 glasses (8 ounces each) a day is a good goal. Other fluids include sport drinks, sodas without caffeine, juices, tea, or soup. If you also have heart or kidney disease, check with your provider before you drink extra fluids.    Finish all prescription medicine. Take antibiotic or antiviral medicine as prescribed by your healthcare provider, even if you are feeling better after a few days. Take the medicine until it is all gone.    Try to stay away from air pollution. If you live in an area with air pollution, track the Air Quality Index (AQI) reports and plan your outdoor activities with the AQI recommendations in mind  Follow-up care  Follow up with your healthcare provider in the next 2 to 3 days, or as advised. This is to be sure the medicine is helping you get better.  If you are 65 or older, you should get a pneumococcal vaccine and a yearly flu (influenza) shot. You should also get these  vaccines if you have chronic lung disease such as asthma, emphysema, or COPD. A second type of pneumonia vaccine is also available for people over age 65 and those younger than 65 with certain health conditions. Talk with your healthcare provider about which pneumococcal vaccine is best for you.  Call 911  Call 911 if any of these occur:    Unable to speak or swallow    Lips or skin looks blue, purple, or gray    Feeling dizzy or faint    Unable to wake up or loss of consciousness    Feeling of doom    Trouble breathing or wheezing    Shortness of breath gets worse or doesn't get better with treatment    Rapid breathing (more than 25 breaths per minute)    Coughing up blood    Chest pain gets worse with breathing or doesn't get better with treatment  When to get medical advice  Call your healthcare provider right away if any of these occur:    You don t get better in the first 2 days of treatment    Fever of 100.4 F (38 C) or higher, or as directed by your healthcare provider    Shaking chills    Cough with phlegm that doesn't get better, or get worse    Shortness of breath with activities    Weakness, dizziness, or fainting that gets worse    Thirst or dry mouth that gets worse    Sinus pain, headache, or a stiff neck    Chest pain with breathing or coughing    Symptoms that get worse or not improving  [employer] last reviewed this educational content on 11/1/2019 2000-2020 [employer]. All rights reserved. This information is not intended as a substitute for professional medical care. Always follow your healthcare professional's instructions.

## 2021-03-24 NOTE — PROGRESS NOTES
"SUBJECTIVE:   Miguel Rivera is a 55 year old male presenting with a chief complaint of   Chief Complaint   Patient presents with     Cough     productive cough, feeling very tire, sluggish, denies fever and chills.        He is a new patient of [facility].  Patient presents with complaints of gaining weight and \"feeling crappy\" x 7 days.  Patient quit smoking in November.   Patient has not seen PCP in over a year.  Patient has a productive cough - black and green.  Patient on oxygen  5 Liters.        Review of Systems   Constitutional: Positive for appetite change and fatigue. Negative for chills, diaphoresis and fever.   HENT: Negative for congestion, ear pain and sore throat.    Respiratory: Positive for cough. Negative for chest tightness and shortness of breath.    Cardiovascular: Negative.    Gastrointestinal: Negative.    Genitourinary: Negative.    Musculoskeletal: Negative.    Skin: Negative.    Neurological: Negative.  Negative for dizziness and headaches.   All other systems reviewed and are negative.      History reviewed. No pertinent past medical history.  History reviewed. No pertinent family history.  Current Outpatient Medications   Medication Sig Dispense Refill     albuterol (VENTOLIN HFA) 108 (90 Base) MCG/ACT Inhaler INHALE TWO PUFFS BY MOUTH EVERY 4 HOURS AS NEEDED FOR WHEEZING       atorvastatin (LIPITOR) 20 MG tablet Take 20 mg by mouth       azithromycin (ZITHROMAX) 250 MG tablet Two tablets first day, then one tablet daily for four days. 6 tablet 0     Budesonide-Formoterol Fumarate (SYMBICORT IN)        fluticasone-salmeterol (ADVAIR DISKUS) 100-50 MCG/DOSE diskus inhaler Inhale 1 puff into the lungs every 12 hours       Furosemide (LASIX PO)        lisinopril (PRINIVIL/ZESTRIL) 10 MG tablet Take 10 mg by mouth       metFORMIN (GLUCOPHAGE) 500 MG tablet Take 500 mg by mouth       mometasone-formoterol (DULERA) 200-5 MCG/ACT oral inhaler Inhale two puffs twice a day- RINSE MOUTH after DOSE. "       tiotropium (SPIRIVA HANDIHALER) 18 MCG capsule INHALE ONE DOSE BY MOUTH ONCE DAILY       Social History     Tobacco Use     Smoking status: Current Every Day Smoker     Packs/day: 1.00     Types: Cigarettes     Smokeless tobacco: Never Used   Substance Use Topics     Alcohol use: Not on file       OBJECTIVE  /81   Pulse 118   Temp 98.3  F (36.8  C) (Tympanic)   Resp 16   SpO2 94%     Physical Exam  Vitals signs and nursing note reviewed.   Constitutional:       Appearance: Normal appearance. He is obese.   Eyes:      Extraocular Movements: Extraocular movements intact.      Conjunctiva/sclera: Conjunctivae normal.   Cardiovascular:      Rate and Rhythm: Normal rate and regular rhythm.   Pulmonary:      Effort: Pulmonary effort is normal.      Breath sounds: Rales present.      Comments: Crackles bilaterally  Neurological:      General: No focal deficit present.      Mental Status: He is alert.   Psychiatric:         Mood and Affect: Mood normal.         Labs:  Results for orders placed or performed in visit on 03/24/21 (from the past 24 hour(s))   XR Chest 2 Views    Narrative    CHEST TWO VIEW   3/24/2021 4:08 PM     HISTORY: Cough    COMPARISON: None available.      Impression    IMPRESSION: PA and lateral views of the chest were obtained. Enlarged  cardiac silhouette. Bilateral basilar predominant patchy airspace  pulmonary opacities, worrisome for infection. No significant pleural  effusion or pneumothorax.    TRESA ANDRE MD       X-Ray was done, my findings are: patchy pulmonary opacities bilaterally.    Xrays personally viewed by myself.       ASSESSMENT:      ICD-10-CM    1. Cough  R05 Symptomatic COVID-19 Virus (Coronavirus) by PCR     XR Chest 2 Views   2. Pneumonia due to infectious organism, unspecified laterality, unspecified part of lung  J18.9         Medical Decision Making:    Differential Diagnosis:  Bronchitis, pneumonia      Serious Comorbid Conditions:  Adult:  as  "above    PLAN:    Rx for amoxicillin and zpak.  Discussed and recommended CDC guidelines for self isolation.  COVID test pending.    Discussed with the patient and all questioned fully answered. He will call me if any problems arise.  RF on spiriva.  Asked patient to see PCP in a few days for recheck.        Followup:    If not improving or if condition worsens, follow up with your Primary Care Provider, If not improving or if conditions worsens over the next 12-24 hours, go to the Emergency Department    Patient Instructions     Patient Education   After Your COVID-19 (Coronavirus) Test  You have been tested for COVID-19 (coronavirus).   If you'll have surgery in the next few days, we'll let you know ahead of time if you have the virus. Please call your surgeon's office with any questions.  For all other patients: Results are usually available in [employer] within 2 to 3 days.   If you do not have a [employer] account, you'll get a letter in the mail in about 7 to 10 days.   [employer] is often the fastest way to get test results. Please sign up if you do not already have a [employer] account. See the handout Getting COVID-19 Test Results in [employer] for help.  What if my test result is positive?  If your test is positive and you have not viewed your result in [employer], you'll get a phone call with your result. (A positive test means that you have the virus.)     Follow the tips under \"How do I self-isolate?\" below for 10 days (20 days if you have a weak immune system).    You don't need to be retested for COVID-19 before going back to school or work. As long as you're fever-free and feeling better, you can go back to school, work and other activities after waiting the 10 or 20 days.  What if I have questions after I get my results?  If you have questions about your results, please visit our testing website at [email or web address].   After 7 to 10 days, if you have not gotten your results:     Call " "[phone] ([phone]) and ask to speak with our COVID-19 results team.    If you're being treated at an infusion center: Call your infusion center directly.  What are the symptoms of COVID-19?  Cough, fever and trouble breathing are the most common signs of COVID-19.  Other symptoms can include new headaches, new muscle or body aches, new and unexplained fatigue (feeling very tired), chills, sore throat, congestion (stuffy or runny nose), diarrhea (loose poop), loss of taste or smell, belly pain, and nausea or vomiting (feeling sick to your stomach or throwing up).  You may already have symptoms of COVID-19, or they may show up later.  What should I do if I have symptoms?  If you're having surgery: Call your surgeon's office.  For all other patients: Stay home and away from others (self-isolate) until ...    You've had no fever--and no medicine that reduces fever--for 1 full day (24 hours), AND    Other symptoms have gotten better. For example, your cough or breathing has improved, AND    At least 10 days have passed since your symptoms first started.  How do I self-isolate?    Stay in your own room, even for meals. Use your own bathroom if you can.    Stay away from others in your home. No hugging, kissing or shaking hands. No visitors.    Don't go to work, school or anywhere else.    Clean \"high touch\" surfaces often (doorknobs, counters, handles). Use household cleaning spray or wipes. You'll find a full list of  on the EPA website: www.epa.gov/pesticide-registration/list-n-disinfectants-use-against-sars-cov-2.    Cover your mouth and nose with a mask or other face covering to avoid spreading germs.    Wash your hands and face often. Use soap and water.    Caregivers in these groups are at risk for severe illness due to COVID-19:  ? People 65 years and older  ? People who live in a nursing home or long-term care facility  ? People with chronic disease (lung, heart, cancer, diabetes, kidney, " liver, immunologic)  ? People who have a weakened immune system, including those who:    Are in cancer treatment    Take medicine that weakens the immune system, such as corticosteroids    Had a bone marrow or organ transplant    Have an immune deficiency    Have poorly controlled HIV or AIDS    Are obese (body mass index of 40 or higher)    Smoke regularly    Caregivers should wear gloves while washing dishes, handling laundry and cleaning bedrooms and bathrooms.    Use caution when washing and drying laundry: Don't shake dirty laundry and use the warmest water setting that you can.    For more tips on managing your health at home, go to www.cdc.gov/coronavirus/2019-ncov/downloads/10Things.pdf.  How can I take care of myself at home?  1. Get lots of rest. Drink extra fluids (unless a doctor has told you not to).  2. Take Tylenol (acetaminophen) for fever or pain. If you have liver or kidney problems, ask your family doctor if it's OK to take Tylenol.   Adults can take either:  ? 650 mg (two 325 mg pills) every 4 to 6 hours, or   ? 1,000 mg (two 500 mg pills) every 8 hours as needed.  ? Note: Don't take more than 3,000 mg in one day. Acetaminophen is found in many medicines (both prescribed and over-the-counter medicines). Read all labels to be sure you don't take too much.   For children, check the Tylenol bottle for the right dose. The dose is based on the child's age or weight.  3. If you have other health problems (like cancer, heart failure, an organ transplant or severe kidney disease): Call your specialty clinic if you don't feel better in the next 2 days.  4. Know when to call 911. Emergency warning signs include:  ? Trouble breathing or shortness of breath  ? Chest pain or pressure that doesn't go away  ? Feeling confused like you haven't felt before, or not being able to wake up  ? Bluish-colored lips or face  5. If your doctor prescribed a blood thinner medicine: Follow their instructions.  Where can I  get more information?    [facility] - About COVID-19:   [email or web address]    CDC - If You're Sick: cdc.gov/coronavirus/2019-ncov/about/steps-when-sick.html    CDC - Ending Home Isolation: www.cdc.gov/coronavirus/2019-ncov/hcp/disposition-in-home-patients.html    CDC - Caring for Someone: www.cdc.gov/coronavirus/2019-ncov/if-you-are-sick/care-for-someone.html    [facility] - Interim Guidance for Hospital Discharge to Home: www.health.[facility].mn./diseases/coronavirus/hcp/hospdischarge.pdf    [facility] clinical trials (COVID-19 research studies): [email or web address]    Below are the COVID-19 hotlines at the [place] Department of Health ([facility]). Interpreters are available.  ? For health questions: Call 875-464-4161 or 1-581.601.6176 (7 a.m. to 7 p.m.)  ? For questions about schools and childcare: Call 336-874-5861 or 1-747.789.3807 (7 a.m. to 7 p.m.)    For informational purposes only. Not to replace the advice of your health care provider. Clinically reviewed by Infection Prevention and the [facility] COVID-19 Clinical Team. Copyright   2020 [facility]. All rights reserved. [employer] 003122 - Rev 11/11/20.

## 2021-03-25 LAB
LABORATORY COMMENT REPORT: NORMAL
SARS-COV-2 RNA RESP QL NAA+PROBE: NEGATIVE
SPECIMEN SOURCE: NORMAL

## 2025-01-01 ENCOUNTER — APPOINTMENT (OUTPATIENT)
Dept: GENERAL RADIOLOGY | Facility: CLINIC | Age: 60
End: 2025-01-01
Attending: INTERNAL MEDICINE
Payer: COMMERCIAL

## 2025-01-01 ENCOUNTER — APPOINTMENT (OUTPATIENT)
Dept: CT IMAGING | Facility: CLINIC | Age: 60
End: 2025-01-01
Attending: EMERGENCY MEDICINE
Payer: COMMERCIAL

## 2025-01-01 ENCOUNTER — APPOINTMENT (OUTPATIENT)
Dept: ULTRASOUND IMAGING | Facility: CLINIC | Age: 60
End: 2025-01-01
Attending: INTERNAL MEDICINE
Payer: COMMERCIAL

## 2025-01-01 ENCOUNTER — APPOINTMENT (OUTPATIENT)
Dept: CARDIOLOGY | Facility: CLINIC | Age: 60
End: 2025-01-01
Payer: COMMERCIAL

## 2025-01-01 ENCOUNTER — APPOINTMENT (OUTPATIENT)
Dept: GENERAL RADIOLOGY | Facility: CLINIC | Age: 60
End: 2025-01-01
Attending: PHYSICIAN ASSISTANT
Payer: COMMERCIAL

## 2025-01-01 ENCOUNTER — APPOINTMENT (OUTPATIENT)
Dept: GENERAL RADIOLOGY | Facility: CLINIC | Age: 60
End: 2025-01-01
Attending: EMERGENCY MEDICINE
Payer: COMMERCIAL

## 2025-01-01 ENCOUNTER — APPOINTMENT (OUTPATIENT)
Dept: GENERAL RADIOLOGY | Facility: CLINIC | Age: 60
End: 2025-01-01
Payer: COMMERCIAL

## 2025-01-01 ENCOUNTER — APPOINTMENT (OUTPATIENT)
Dept: MRI IMAGING | Facility: CLINIC | Age: 60
End: 2025-01-01
Attending: PSYCHIATRY & NEUROLOGY
Payer: COMMERCIAL

## 2025-01-01 ENCOUNTER — APPOINTMENT (OUTPATIENT)
Dept: CT IMAGING | Facility: CLINIC | Age: 60
End: 2025-01-01
Attending: PSYCHIATRY & NEUROLOGY
Payer: COMMERCIAL

## 2025-01-01 PROCEDURE — 70450 CT HEAD/BRAIN W/O DYE: CPT

## 2025-01-01 PROCEDURE — 93306 TTE W/DOPPLER COMPLETE: CPT | Mod: 26 | Performed by: INTERNAL MEDICINE

## 2025-01-01 PROCEDURE — 71045 X-RAY EXAM CHEST 1 VIEW: CPT

## 2025-01-01 PROCEDURE — 999N000065 XR CHEST PORT 1 VIEW

## 2025-01-01 PROCEDURE — 999N000065 XR ABDOMEN PORT 1 VIEW

## 2025-01-01 PROCEDURE — 71275 CT ANGIOGRAPHY CHEST: CPT

## 2025-01-01 PROCEDURE — C8929 TTE W OR WO FOL WCON,DOPPLER: HCPCS

## 2025-01-01 PROCEDURE — 999N000208 ECHOCARDIOGRAM COMPLETE

## 2025-01-01 PROCEDURE — 70553 MRI BRAIN STEM W/O & W/DYE: CPT

## 2025-01-01 PROCEDURE — 74177 CT ABD & PELVIS W/CONTRAST: CPT

## 2025-01-01 PROCEDURE — 93971 EXTREMITY STUDY: CPT | Mod: RT

## 2025-01-01 PROCEDURE — 74018 RADEX ABDOMEN 1 VIEW: CPT

## 2025-03-18 ENCOUNTER — HOSPITAL ENCOUNTER (INPATIENT)
Facility: CLINIC | Age: 60
End: 2025-03-18
Attending: EMERGENCY MEDICINE
Payer: COMMERCIAL

## 2025-03-18 DIAGNOSIS — J90 PLEURAL EFFUSION: ICD-10-CM

## 2025-03-18 DIAGNOSIS — S22.49XA CLOSED FRACTURE OF MULTIPLE RIBS, UNSPECIFIED LATERALITY, INITIAL ENCOUNTER: ICD-10-CM

## 2025-03-18 DIAGNOSIS — I46.9 CARDIOPULMONARY ARREST (H): ICD-10-CM

## 2025-03-18 DIAGNOSIS — I48.20 CHRONIC A-FIB (H): ICD-10-CM

## 2025-03-18 DIAGNOSIS — G93.1 ANOXIC BRAIN DAMAGE (H): ICD-10-CM

## 2025-03-18 LAB
ALBUMIN SERPL BCG-MCNC: 3.4 G/DL (ref 3.5–5.2)
ALBUMIN UR-MCNC: 300 MG/DL
ALLEN'S TEST: YES
ALP SERPL-CCNC: 134 U/L (ref 40–150)
ALT SERPL W P-5'-P-CCNC: 48 U/L (ref 0–70)
ANION GAP SERPL CALCULATED.3IONS-SCNC: 10 MMOL/L (ref 7–15)
ANION GAP SERPL CALCULATED.3IONS-SCNC: 15 MMOL/L (ref 7–15)
APPEARANCE UR: ABNORMAL
AST SERPL W P-5'-P-CCNC: 71 U/L (ref 0–45)
BACTERIA #/AREA URNS HPF: ABNORMAL /HPF
BASE EXCESS BLDA CALC-SCNC: 13 MMOL/L (ref -3–3)
BASE EXCESS BLDA CALC-SCNC: 20 MMOL/L (ref -3–3)
BASE EXCESS BLDV CALC-SCNC: 17.1 MMOL/L (ref -3–3)
BASE EXCESS BLDV CALC-SCNC: ABNORMAL MMOL/L
BASOPHILS # BLD AUTO: 0 10E3/UL (ref 0–0.2)
BASOPHILS NFR BLD AUTO: 0 %
BILIRUB SERPL-MCNC: 0.2 MG/DL
BILIRUB UR QL STRIP: NEGATIVE
BUN SERPL-MCNC: 15.8 MG/DL (ref 8–23)
BUN SERPL-MCNC: 19.6 MG/DL (ref 8–23)
CA-I BLD-MCNC: 4.2 MG/DL (ref 4.4–5.2)
CALCIUM SERPL-MCNC: 8.9 MG/DL (ref 8.8–10.4)
CALCIUM SERPL-MCNC: 9.5 MG/DL (ref 8.8–10.4)
CHLORIDE SERPL-SCNC: 92 MMOL/L (ref 98–107)
CHLORIDE SERPL-SCNC: 93 MMOL/L (ref 98–107)
CK SERPL-CCNC: 122 U/L (ref 39–308)
COLOR UR AUTO: ABNORMAL
CREAT SERPL-MCNC: 0.67 MG/DL (ref 0.67–1.17)
CREAT SERPL-MCNC: 0.68 MG/DL (ref 0.67–1.17)
EGFRCR SERPLBLD CKD-EPI 2021: >90 ML/MIN/1.73M2
EGFRCR SERPLBLD CKD-EPI 2021: >90 ML/MIN/1.73M2
EOSINOPHIL # BLD AUTO: 0.1 10E3/UL (ref 0–0.7)
EOSINOPHIL NFR BLD AUTO: 1 %
ERYTHROCYTE [DISTWIDTH] IN BLOOD BY AUTOMATED COUNT: 14.3 % (ref 10–15)
ERYTHROCYTE [DISTWIDTH] IN BLOOD BY AUTOMATED COUNT: 14.4 % (ref 10–15)
GIANT PLATELETS BLD QL SMEAR: SLIGHT
GLUCOSE BLDC GLUCOMTR-MCNC: 130 MG/DL (ref 70–99)
GLUCOSE SERPL-MCNC: 137 MG/DL (ref 70–99)
GLUCOSE SERPL-MCNC: 165 MG/DL (ref 70–99)
GLUCOSE UR STRIP-MCNC: 70 MG/DL
HCO3 BLD-SCNC: 48 MMOL/L (ref 21–28)
HCO3 BLDA-SCNC: 42 MMOL/L (ref 21–28)
HCO3 BLDV-SCNC: 47 MMOL/L (ref 21–28)
HCO3 BLDV-SCNC: <1 MMOL/L (ref 21–28)
HCO3 SERPL-SCNC: 36 MMOL/L (ref 22–29)
HCO3 SERPL-SCNC: 40 MMOL/L (ref 22–29)
HCT VFR BLD AUTO: 36.7 % (ref 40–53)
HCT VFR BLD AUTO: 36.9 % (ref 40–53)
HGB BLD-MCNC: 10.4 G/DL (ref 13.3–17.7)
HGB BLD-MCNC: 9.9 G/DL (ref 13.3–17.7)
HGB UR QL STRIP: ABNORMAL
HOLD SPECIMEN: NORMAL
IMM GRANULOCYTES # BLD: 0.4 10E3/UL
IMM GRANULOCYTES NFR BLD: 4 %
KETONES UR STRIP-MCNC: NEGATIVE MG/DL
LACTATE BLD-SCNC: 11.1 MMOL/L (ref 0.7–2)
LACTATE BLD-SCNC: 5.9 MMOL/L (ref 0.7–2)
LACTATE SERPL-SCNC: 3.7 MMOL/L (ref 0.7–2)
LEUKOCYTE ESTERASE UR QL STRIP: NEGATIVE
LVEF ECHO: NORMAL
LYMPHOCYTES # BLD AUTO: 1.7 10E3/UL (ref 0.8–5.3)
LYMPHOCYTES NFR BLD AUTO: 17 %
MAGNESIUM SERPL-MCNC: 1.8 MG/DL (ref 1.7–2.3)
MAGNESIUM SERPL-MCNC: 2.3 MG/DL (ref 1.7–2.3)
MCH RBC QN AUTO: 28.3 PG (ref 26.5–33)
MCH RBC QN AUTO: 28.6 PG (ref 26.5–33)
MCHC RBC AUTO-ENTMCNC: 27 G/DL (ref 31.5–36.5)
MCHC RBC AUTO-ENTMCNC: 28.2 G/DL (ref 31.5–36.5)
MCV RBC AUTO: 100 FL (ref 78–100)
MCV RBC AUTO: 106 FL (ref 78–100)
MONOCYTES # BLD AUTO: 1 10E3/UL (ref 0–1.3)
MONOCYTES NFR BLD AUTO: 10 %
NEUTROPHILS # BLD AUTO: 6.9 10E3/UL (ref 1.6–8.3)
NEUTROPHILS NFR BLD AUTO: 68 %
NITRATE UR QL: NEGATIVE
NRBC # BLD AUTO: 0 10E3/UL
NRBC BLD AUTO-RTO: 0 /100
NT-PROBNP SERPL-MCNC: 234 PG/ML (ref 0–900)
O2/TOTAL GAS SETTING VFR VENT: 2 %
O2/TOTAL GAS SETTING VFR VENT: 70 %
OXYHGB MFR BLDA: 92 % (ref 92–100)
OXYHGB MFR BLDV: 60 % (ref 70–75)
PCO2 BLD: 80 MM HG (ref 35–45)
PCO2 BLDA: 87 MM HG (ref 35–45)
PCO2 BLDV: 98 MM HG (ref 40–50)
PCO2 BLDV: >130 MM HG (ref 40–50)
PEEP: 5 CM H2O
PH BLD: 7.39 [PH] (ref 7.35–7.45)
PH BLDA: 7.3 [PH] (ref 7.35–7.45)
PH BLDV: 7.02 [PH] (ref 7.32–7.43)
PH BLDV: 7.29 [PH] (ref 7.32–7.43)
PH UR STRIP: 7.5 [PH] (ref 5–7)
PHOSPHATE SERPL-MCNC: 4 MG/DL (ref 2.5–4.5)
PLAT MORPH BLD: ABNORMAL
PLATELET # BLD AUTO: 238 10E3/UL (ref 150–450)
PLATELET # BLD AUTO: 256 10E3/UL (ref 150–450)
PO2 BLD: 70 MM HG (ref 80–105)
PO2 BLDA: 57 MM HG (ref 80–105)
PO2 BLDV: 35 MM HG (ref 25–47)
PO2 BLDV: 87 MM HG (ref 25–47)
POTASSIUM SERPL-SCNC: 4.3 MMOL/L (ref 3.4–5.3)
POTASSIUM SERPL-SCNC: 4.6 MMOL/L (ref 3.4–5.3)
PROT SERPL-MCNC: 6.9 G/DL (ref 6.4–8.3)
RADIOLOGIST FLAGS: NORMAL
RBC # BLD AUTO: 3.46 10E6/UL (ref 4.4–5.9)
RBC # BLD AUTO: 3.68 10E6/UL (ref 4.4–5.9)
RBC MORPH BLD: ABNORMAL
RBC URINE: 105 /HPF
SAO2 % BLDA: 84 % (ref 96–97)
SAO2 % BLDA: 93.9 % (ref 96–97)
SAO2 % BLDV: 61.3 % (ref 70–75)
SAO2 % BLDV: ABNORMAL %
SODIUM SERPL-SCNC: 142 MMOL/L (ref 135–145)
SODIUM SERPL-SCNC: 144 MMOL/L (ref 135–145)
SP GR UR STRIP: 1.02 (ref 1–1.03)
TRANSITIONAL EPI: <1 /HPF
TROPONIN T SERPL HS-MCNC: 12 NG/L
UROBILINOGEN UR STRIP-MCNC: NORMAL MG/DL
WBC # BLD AUTO: 10.1 10E3/UL (ref 4–11)
WBC # BLD AUTO: 10.9 10E3/UL (ref 4–11)
WBC URINE: 0 /HPF

## 2025-03-18 PROCEDURE — 36600 WITHDRAWAL OF ARTERIAL BLOOD: CPT

## 2025-03-18 PROCEDURE — 250N000009 HC RX 250: Performed by: EMERGENCY MEDICINE

## 2025-03-18 PROCEDURE — 85025 COMPLETE CBC W/AUTO DIFF WBC: CPT | Performed by: EMERGENCY MEDICINE

## 2025-03-18 PROCEDURE — 31500 INSERT EMERGENCY AIRWAY: CPT

## 2025-03-18 PROCEDURE — 999N000157 HC STATISTIC RCP TIME EA 10 MIN

## 2025-03-18 PROCEDURE — 99291 CRITICAL CARE FIRST HOUR: CPT | Mod: 25

## 2025-03-18 PROCEDURE — 84100 ASSAY OF PHOSPHORUS: CPT

## 2025-03-18 PROCEDURE — 96374 THER/PROPH/DIAG INJ IV PUSH: CPT

## 2025-03-18 PROCEDURE — 999N000178 HC STATISTIC SUCTION SPUTUM

## 2025-03-18 PROCEDURE — 82330 ASSAY OF CALCIUM: CPT

## 2025-03-18 PROCEDURE — 81003 URINALYSIS AUTO W/O SCOPE: CPT | Performed by: EMERGENCY MEDICINE

## 2025-03-18 PROCEDURE — 36415 COLL VENOUS BLD VENIPUNCTURE: CPT

## 2025-03-18 PROCEDURE — 255N000002 HC RX 255 OP 636

## 2025-03-18 PROCEDURE — 87040 BLOOD CULTURE FOR BACTERIA: CPT | Performed by: EMERGENCY MEDICINE

## 2025-03-18 PROCEDURE — 82803 BLOOD GASES ANY COMBINATION: CPT

## 2025-03-18 PROCEDURE — 94002 VENT MGMT INPAT INIT DAY: CPT

## 2025-03-18 PROCEDURE — 250N000013 HC RX MED GY IP 250 OP 250 PS 637

## 2025-03-18 PROCEDURE — 3E043XZ INTRODUCTION OF VASOPRESSOR INTO CENTRAL VEIN, PERCUTANEOUS APPROACH: ICD-10-PCS | Performed by: EMERGENCY MEDICINE

## 2025-03-18 PROCEDURE — 85027 COMPLETE CBC AUTOMATED: CPT

## 2025-03-18 PROCEDURE — 999N000254 HC STATISTIC VENTILATOR TRANSFER

## 2025-03-18 PROCEDURE — 250N000011 HC RX IP 250 OP 636: Performed by: EMERGENCY MEDICINE

## 2025-03-18 PROCEDURE — 85048 AUTOMATED LEUKOCYTE COUNT: CPT

## 2025-03-18 PROCEDURE — 93005 ELECTROCARDIOGRAM TRACING: CPT

## 2025-03-18 PROCEDURE — 83735 ASSAY OF MAGNESIUM: CPT | Performed by: EMERGENCY MEDICINE

## 2025-03-18 PROCEDURE — 999N000009 HC STATISTIC AIRWAY CARE

## 2025-03-18 PROCEDURE — 84484 ASSAY OF TROPONIN QUANT: CPT | Performed by: EMERGENCY MEDICINE

## 2025-03-18 PROCEDURE — 83880 ASSAY OF NATRIURETIC PEPTIDE: CPT | Performed by: EMERGENCY MEDICINE

## 2025-03-18 PROCEDURE — 250N000011 HC RX IP 250 OP 636

## 2025-03-18 PROCEDURE — 82550 ASSAY OF CK (CPK): CPT

## 2025-03-18 PROCEDURE — 80051 ELECTROLYTE PANEL: CPT

## 2025-03-18 PROCEDURE — 83735 ASSAY OF MAGNESIUM: CPT

## 2025-03-18 PROCEDURE — 87070 CULTURE OTHR SPECIMN AEROBIC: CPT

## 2025-03-18 PROCEDURE — 82805 BLOOD GASES W/O2 SATURATION: CPT | Performed by: EMERGENCY MEDICINE

## 2025-03-18 PROCEDURE — 82310 ASSAY OF CALCIUM: CPT | Performed by: EMERGENCY MEDICINE

## 2025-03-18 PROCEDURE — 83605 ASSAY OF LACTIC ACID: CPT

## 2025-03-18 PROCEDURE — 36415 COLL VENOUS BLD VENIPUNCTURE: CPT | Performed by: EMERGENCY MEDICINE

## 2025-03-18 PROCEDURE — 200N000001 HC R&B ICU

## 2025-03-18 PROCEDURE — 82947 ASSAY GLUCOSE BLOOD QUANT: CPT

## 2025-03-18 PROCEDURE — 5A1955Z RESPIRATORY VENTILATION, GREATER THAN 96 CONSECUTIVE HOURS: ICD-10-PCS | Performed by: EMERGENCY MEDICINE

## 2025-03-18 PROCEDURE — 82805 BLOOD GASES W/O2 SATURATION: CPT | Performed by: SURGERY

## 2025-03-18 PROCEDURE — 80048 BASIC METABOLIC PNL TOTAL CA: CPT | Performed by: EMERGENCY MEDICINE

## 2025-03-18 RX ORDER — PROPOFOL 10 MG/ML
5-75 INJECTION, EMULSION INTRAVENOUS CONTINUOUS
Status: DISCONTINUED | OUTPATIENT
Start: 2025-03-18 | End: 2025-03-22

## 2025-03-18 RX ORDER — IOPAMIDOL 755 MG/ML
114 INJECTION, SOLUTION INTRAVASCULAR ONCE
Status: COMPLETED | OUTPATIENT
Start: 2025-03-18 | End: 2025-03-18

## 2025-03-18 RX ORDER — NALOXONE HYDROCHLORIDE 0.4 MG/ML
0.2 INJECTION, SOLUTION INTRAMUSCULAR; INTRAVENOUS; SUBCUTANEOUS
Status: DISCONTINUED | OUTPATIENT
Start: 2025-03-18 | End: 2025-03-27

## 2025-03-18 RX ORDER — ALBUTEROL SULFATE 0.83 MG/ML
2.5 SOLUTION RESPIRATORY (INHALATION) EVERY 4 HOURS PRN
Status: DISCONTINUED | OUTPATIENT
Start: 2025-03-18 | End: 2025-03-27

## 2025-03-18 RX ORDER — NALOXONE HYDROCHLORIDE 0.4 MG/ML
0.4 INJECTION, SOLUTION INTRAMUSCULAR; INTRAVENOUS; SUBCUTANEOUS
Status: DISCONTINUED | OUTPATIENT
Start: 2025-03-18 | End: 2025-03-27

## 2025-03-18 RX ORDER — ROPIVACAINE IN 0.9% SOD CHL/PF 0.1 %
.01-.125 PLASTIC BAG, INJECTION (ML) EPIDURAL CONTINUOUS
Status: DISCONTINUED | OUTPATIENT
Start: 2025-03-18 | End: 2025-03-19

## 2025-03-18 RX ORDER — POLYETHYLENE GLYCOL 3350 17 G/17G
17 POWDER, FOR SOLUTION ORAL DAILY PRN
Status: DISCONTINUED | OUTPATIENT
Start: 2025-03-18 | End: 2025-03-27

## 2025-03-18 RX ORDER — DEXTROSE MONOHYDRATE 25 G/50ML
25-50 INJECTION, SOLUTION INTRAVENOUS
Status: DISCONTINUED | OUTPATIENT
Start: 2025-03-18 | End: 2025-03-19

## 2025-03-18 RX ORDER — NICOTINE POLACRILEX 4 MG
15-30 LOZENGE BUCCAL
Status: DISCONTINUED | OUTPATIENT
Start: 2025-03-18 | End: 2025-03-19

## 2025-03-18 RX ORDER — CHLORHEXIDINE GLUCONATE ORAL RINSE 1.2 MG/ML
15 SOLUTION DENTAL EVERY 12 HOURS
Status: DISCONTINUED | OUTPATIENT
Start: 2025-03-18 | End: 2025-03-27

## 2025-03-18 RX ORDER — INDOMETHACIN 25 MG/1
50 CAPSULE ORAL ONCE
Status: COMPLETED | OUTPATIENT
Start: 2025-03-18 | End: 2025-03-18

## 2025-03-18 RX ORDER — FUROSEMIDE 40 MG/1
40 TABLET ORAL
COMMUNITY

## 2025-03-18 RX ORDER — ACETAMINOPHEN 325 MG/10.15ML
650 LIQUID ORAL EVERY 6 HOURS PRN
Status: DISCONTINUED | OUTPATIENT
Start: 2025-03-18 | End: 2025-03-23

## 2025-03-18 RX ORDER — PROPOFOL 10 MG/ML
5-75 INJECTION, EMULSION INTRAVENOUS CONTINUOUS
Status: DISCONTINUED | OUTPATIENT
Start: 2025-03-18 | End: 2025-03-18

## 2025-03-18 RX ORDER — PANTOPRAZOLE SODIUM 40 MG/1
40 TABLET, DELAYED RELEASE ORAL DAILY
COMMUNITY

## 2025-03-18 RX ORDER — ACETAMINOPHEN 325 MG/1
650 TABLET ORAL EVERY 6 HOURS PRN
Status: DISCONTINUED | OUTPATIENT
Start: 2025-03-18 | End: 2025-03-23

## 2025-03-18 RX ORDER — ONDANSETRON 4 MG/1
4 TABLET, ORALLY DISINTEGRATING ORAL EVERY 6 HOURS PRN
Status: DISCONTINUED | OUTPATIENT
Start: 2025-03-18 | End: 2025-03-29 | Stop reason: HOSPADM

## 2025-03-18 RX ORDER — SODIUM CHLORIDE 9 MG/ML
INJECTION, SOLUTION INTRAVENOUS CONTINUOUS
Status: DISCONTINUED | OUTPATIENT
Start: 2025-03-18 | End: 2025-03-29 | Stop reason: HOSPADM

## 2025-03-18 RX ORDER — HYDROMORPHONE HCL IN WATER/PF 6 MG/30 ML
0.2 PATIENT CONTROLLED ANALGESIA SYRINGE INTRAVENOUS
Status: DISCONTINUED | OUTPATIENT
Start: 2025-03-18 | End: 2025-03-29 | Stop reason: HOSPADM

## 2025-03-18 RX ORDER — LATANOPROST 50 UG/ML
1 SOLUTION/ DROPS OPHTHALMIC AT BEDTIME
COMMUNITY

## 2025-03-18 RX ORDER — AMOXICILLIN 250 MG
1 CAPSULE ORAL 2 TIMES DAILY PRN
Status: DISCONTINUED | OUTPATIENT
Start: 2025-03-18 | End: 2025-03-26

## 2025-03-18 RX ORDER — ONDANSETRON 2 MG/ML
4 INJECTION INTRAMUSCULAR; INTRAVENOUS EVERY 6 HOURS PRN
Status: DISCONTINUED | OUTPATIENT
Start: 2025-03-18 | End: 2025-03-29 | Stop reason: HOSPADM

## 2025-03-18 RX ORDER — IPRATROPIUM BROMIDE AND ALBUTEROL SULFATE 2.5; .5 MG/3ML; MG/3ML
1 SOLUTION RESPIRATORY (INHALATION) EVERY 6 HOURS PRN
COMMUNITY

## 2025-03-18 RX ORDER — FLUTICASONE PROPIONATE 50 MCG
1 SPRAY, SUSPENSION (ML) NASAL DAILY
COMMUNITY

## 2025-03-18 RX ORDER — AMOXICILLIN 250 MG
2 CAPSULE ORAL 2 TIMES DAILY PRN
Status: DISCONTINUED | OUTPATIENT
Start: 2025-03-18 | End: 2025-03-26

## 2025-03-18 RX ADMIN — NOREPINEPHRINE BITARTRATE 0.03 MCG/KG/MIN: 16 SOLUTION INTRAVENOUS at 18:21

## 2025-03-18 RX ADMIN — SODIUM BICARBONATE 50 MEQ: 84 INJECTION INTRAVENOUS at 17:27

## 2025-03-18 RX ADMIN — MIDAZOLAM 4 MG: 1 INJECTION INTRAMUSCULAR; INTRAVENOUS at 19:24

## 2025-03-18 RX ADMIN — IOPAMIDOL 114 ML: 755 INJECTION, SOLUTION INTRAVENOUS at 17:40

## 2025-03-18 RX ADMIN — Medication 50 MCG/HR: at 23:05

## 2025-03-18 RX ADMIN — PERFLUTREN 10 ML: 6.52 INJECTION, SUSPENSION INTRAVENOUS at 23:45

## 2025-03-18 RX ADMIN — SODIUM CHLORIDE 100 ML: 9 INJECTION, SOLUTION INTRAVENOUS at 17:44

## 2025-03-18 RX ADMIN — PROPOFOL 35 MCG/KG/MIN: 10 INJECTION, EMULSION INTRAVENOUS at 23:18

## 2025-03-18 RX ADMIN — CHLORHEXIDINE GLUCONATE 15 ML: 1.2 RINSE ORAL at 23:05

## 2025-03-18 RX ADMIN — PROPOFOL 20 MCG/KG/MIN: 10 INJECTION, EMULSION INTRAVENOUS at 18:09

## 2025-03-18 ASSESSMENT — ACTIVITIES OF DAILY LIVING (ADL)
ADLS_ACUITY_SCORE: 41

## 2025-03-18 ASSESSMENT — COLUMBIA-SUICIDE SEVERITY RATING SCALE - C-SSRS
2. HAVE YOU ACTUALLY HAD ANY THOUGHTS OF KILLING YOURSELF IN THE PAST MONTH?: NO
6. HAVE YOU EVER DONE ANYTHING, STARTED TO DO ANYTHING, OR PREPARED TO DO ANYTHING TO END YOUR LIFE?: NO
1. IN THE PAST MONTH, HAVE YOU WISHED YOU WERE DEAD OR WISHED YOU COULD GO TO SLEEP AND NOT WAKE UP?: NO

## 2025-03-18 NOTE — ED TRIAGE NOTES
Unwitnessed arrest; EMS called @ 1631; they arrived < 10min and started CPR, FIRE did  4 rounds of CPR with NO SHOCK advised; EMS gave 3 rounds of Epi and 1 bicarb ROSC @ 1702.

## 2025-03-18 NOTE — ED NOTES
Bed: [facility]  Expected date:   Expected time:   Means of arrival:   Comments:  423 60m Cardiac arrest with ROSC

## 2025-03-18 NOTE — PROVIDER NOTIFICATION
Patient arrived in STO1 with size 5 igel in place, status post-arrest. Igel removed and patient was intubated with a size 7.5 ETT secured 23 cm at patient's lips. Tube placement confirmed via bilateral breath sounds, positive colorimetric CO2 detector and chest x-ray. Placed on CMV with the following settings, patient parameters, and alarms listed below:     03/18/25 1751   Ventilator Settings    Vent Mode CMV/AC   Resp Rate (Set) 22 breaths/min   Tidal Volume (Set, mL) 450 mL   FiO2 50 %   PEEP (cm H2O) 7 cmH2O   Inspiratory Time (sec) 0.8 sec   Vent Humidifier - Heater/HME HME   Ventilator - Patient    Patient Resp Rate  22 breaths/min   Expiratory Vt (ml) 433   Minute Volume (ml) 9.6 L/min   Peak Inspiratory Pressure (cm H2O) 43 cmH2O   Mean Airway Pressure (cm H2O) 18 cmH2O   Ventilator Alarms   High Inspiratory Pressure Alarm (cmH2O) 50 cm H2O   Low Inspiratory Pressure (cm H2O) 8 cm H2O   High Respiratory Rate (breaths/min) 45 breaths/min   Minute Ventilation High (L) 20 L/min   Minute Ventilation Low (L) 3 L/min   Apnea Delay (sec) 20 sec     Transferred to CT and back without issue    Marino Mei, RT on 3/18/2025 at 5:58 PM

## 2025-03-18 NOTE — ED PROVIDER NOTES
Emergency Department Note      History of Present Illness     Chief Complaint   Cardiac Arrest      HPI   Miguel Rivera is a 59 year old male with a history of acute on chronic hypoxemic and hypercapnic respiratory failure, BiPAP dependence, COPD, stage III CKD, hypertension, hyperlipidemia, right ventricular diastolic dysfunction, tricuspid regurgitation, and type II diabetes mellitus who presents via EMS for evaluation after having a cardiac arrest. Per EMS, the patient was last known well based on a phone call at 1608.  He apparently is staying at a relatives house currently, and he was found unresponsive on the couch by family, although the details are not entirely clear.  Someone called 911 at 1631, and first responders arrived relatively quickly and started CPR.  The first responders started CPR and their AED advised no shock.  Upon EMS arrival, they found him to be in asystole.  They provided him 3 rounds of epinephrine and 1 bicarbonate, and he first went into PEA and then ROSC, reestablishing a pulse at 1702. The patient's blood sugar levels were measured by EMS to be in the 130s.  EMS also placed an I-gel.  He has not required any sedation and has not moved per EMS.  Family arrived later and indicated that the patient's niece left the house at 1608 and he appeared to be okay at that point.  He also apparently has been staying at his brother's house for the last few days.  Family arrived home at 1628 and thought he was sleeping on the couch.  It was shortly thereafter that they realized that he was unresponsive and the oxygen that he would normally be on was not hot on him and apparently the tubing was not working properly either.    Independent Historian   EMS and brother and sister-in-law as detailed above.    Review of External Notes   I reviewed the [facility] ED note from 01/24/2025  I reviewed the Outpatient Clinic Note from 03/13/2025    Past Medical History     Medical History and Problem List  "  Acute on chronic hypoxemic and hypercapnic respiratory failure  BiPAP dependence  Bronchiectasis, right lung   COPD  Stage 3 CKD  Enlarged Right Ventricle  Erythrocytosis due to alveola hypoventilation  Hypertension  Hyperlipidemia  Osteopenia  Right ventricular diastolic dysfunction  Secondary polycythemia  Tricuspid regurgitation  Type II Diabetes Mellitus    Medications   Fluticasone Propionate  Furosemide  Pantoprazole  Metformin  Lisinopril  Atorvastatin    Surgical History   PICC Line Insertion    Physical Exam     Patient Vitals for the past 24 hrs:   BP Temp Temp src Pulse Resp SpO2 Height Weight   03/18/25 1836 115/67 -- -- 82 20 94 % -- --   03/18/25 1833 116/61 -- -- 78 10 (!) 89 % -- --   03/18/25 1830 102/68 -- -- 76 18 -- -- --   03/18/25 1827 96/59 -- -- 79 11 92 % -- --   03/18/25 1826 -- -- -- -- -- -- 1.575 m (5' 2\") --   03/18/25 1824 (!) 83/56 -- -- 82 25 (!) 91 % -- --   03/18/25 1755 -- 96.9  F (36.1  C) Temporal -- -- -- -- --   03/18/25 1753 -- -- -- -- -- -- -- 103.9 kg (229 lb)   03/18/25 1733 -- -- -- -- -- 99 % -- --   03/18/25 1730 (!) 153/88 -- -- 93 (!) 7 (!) 91 % -- --   03/18/25 1727 (!) 162/110 -- -- 100 10 95 % -- --     Physical Exam  Nursing note and vitals reviewed.  Constitutional:  Unresponsive with an I-gel in place and assisted ventilation ongoing.   HENT:   Mouth/Throat:   Mucous membranes are normal.   Eyes:    Pupils are midposition and sluggish.   Cardiovascular:  Normal rate, irregularly irregular rhythm, normal heart sounds and normal pulses. No murmur heard.  Pulmonary/Chest:  Assisted ventilation ongoing with diminished breath sounds present.  Abdominal:   Soft. Normal appearance.   Musculoskeletal:  Extremities atraumatic x 4.   Neurological:   Unresponsive even to painful stimuli.  Skin:    Skin is intact. No rash noted.   Psychiatric:   Unresponsive.      Diagnostics     Lab Results   Labs Ordered and Resulted from Time of ED Arrival to Time of ED Departure "   BASIC METABOLIC PANEL - Abnormal       Result Value    Sodium 144      Potassium 4.6      Chloride 93 (*)     Carbon Dioxide (CO2) 36 (*)     Anion Gap 15      Urea Nitrogen 15.8      Creatinine 0.68      GFR Estimate >90      Calcium 9.5      Glucose 165 (*)    CBC WITH PLATELETS AND DIFFERENTIAL - Abnormal    WBC Count 10.1      RBC Count 3.46 (*)     Hemoglobin 9.9 (*)     Hematocrit 36.7 (*)      (*)     MCH 28.6      MCHC 27.0 (*)     RDW 14.3      Platelet Count 238      % Neutrophils 68      % Lymphocytes 17      % Monocytes 10      % Eosinophils 1      % Basophils 0      % Immature Granulocytes 4      NRBCs per 100 WBC 0      Absolute Neutrophils 6.9      Absolute Lymphocytes 1.7      Absolute Monocytes 1.0      Absolute Eosinophils 0.1      Absolute Basophils 0.0      Absolute Immature Granulocytes 0.4      Absolute NRBCs 0.0     ISTAT GASES LACTATE VENOUS POCT - Abnormal    Lactic Acid POCT 11.1 (*)     Bicarbonate Venous POCT <1 (*)     O2 Sat, Venous POCT        pCO2 Venous POCT >130 (*)     pH Venous POCT 7.02 (*)     pO2 Venous POCT 87 (*)     Base Excess/Deficit (+/-) POCT       ISTAT GASES LACTATE ARTERIAL POCT - Abnormal    Bicarbonate Arterial POCT 42 (*)     Lactic Acid POCT 5.9 (*)     O2 Sat, Arterial POCT 84 (*)     pCO2 Arterial POCT 87 (*)     pH Arterial POCT 7.30 (*)     pO2 Arterial POCT 57 (*)     Base Excess/Deficit (+/-) POCT 13.0 (*)    NT PROBNP INPATIENT - Normal    N terminal Pro BNP Inpatient 234     TROPONIN T, HIGH SENSITIVITY - Normal    Troponin T, High Sensitivity 12     MAGNESIUM - Normal    Magnesium 2.3     BLOOD GAS ARTERIAL   ROUTINE UA WITH MICROSCOPIC REFLEX TO CULTURE   TROPONIN T, HIGH SENSITIVITY   BLOOD CULTURE   BLOOD CULTURE       Imaging   CT Chest PE Abdomen Pelvis w Contrast   Final Result   IMPRESSION:   1.  No acute pulmonary embolism.      2.  Small bilateral pleural effusions. Confluent bilateral lower lobe opacities, with associated volume loss,  most likely atelectasis.      3.  Acute bilateral rib fractures. No pneumothorax.      4.  New right upper lobe 1.1 cm nodule. Consider PET/CT versus tissue sampling.      5.  No acute findings in the abdomen or pelvis.      6.  Short 2.8 cm focal narrowing of the ascending colon, possibly peristalsis, although underlying mass not excluded. Consider correlation with colonoscopy.            [Consider Follow Up: New right upper lobe 1.1 cm nodule; consider PET/CT versus tissue sampling. Short focal narrowing of the ascending colon; consider colonoscopy.]      This report will be copied to the [facility] to ensure a provider acknowledges the finding.          CT Head w/o Contrast   Final Result   IMPRESSION:   1.  Some loss of gray-white matter differentiation involving the bilateral basal ganglia, thalami, and potentially the insular cortex compared to the previous exam. Hypoxic/ischemic injury in these locations is possible. The extent of ischemic injuries    would be more accurately evaluated by MRI.   2.  No hemorrhage, extra-axial collection, or mass effect.         XR Chest Port 1 View   Final Result   IMPRESSION: Gregorio is somewhat difficult to visualize in this patient. Endotracheal tube tip is approximately 3-4 cm above the gregorio. Enteric tube tip below the diaphragm. Mild cardiac enlargement and mild increased pulmonary vascularity. No acute    appearing infiltrates or consolidation. Mild aortic calcification. No significant bony abnormalities.          EKG   ECG taken at 1719, ECG read at 1720  Atrial fibrillation  Rightward axis  Septal infarct, age undetermined  Abnormal ECG  Rate 88 bpm. FL interval * ms. QRS duration 92 ms. QT/QTc 352/425 ms. P-R-T axes * 101 76.    Independent Interpretation   I independently interpreted the patient's chest x-ray and the ET tube appears to be in good position.  There is some pulmonary vascular congestion present.  I also independently interpreted the  patient's noncontrast head CT and I do not appreciate an intracranial hemorrhage, however there does appear to be loss of gray-white matter differentiation.    ED Course      Medications Administered   Medications   propofol (DIPRIVAN) infusion (15 mcg/kg/min × 103.9 kg Intravenous Rate/Dose Change 3/18/25 1837)   norepinephrine (LEVOPHED) 4 mg in  mL PERIPHERAL infusion (0.1 mcg/kg/min × 103.9 kg Intravenous Rate/Dose Change 3/18/25 1829)   sodium bicarbonate 8.4 % injection 50 mEq (50 mEq Intravenous $Given 3/18/25 1727)   iopamidol (ISOVUE-370) solution 114 mL (114 mLs Intravenous $Given 3/18/25 1740)   Saline Flush - CT (100 mLs Intravenous $Given 3/18/25 1744)       Procedures   -Intubation    Date/Time: 3/18/2025 6:53 PM    Performed by: Mir Jacobson MD  Authorized by: Mir Jacobson MD    Emergent condition/consent implied    Pre-procedure details:     Indications: airway protection and cardio/pulmonary arrest      Patient status:  Unresponsive    Look externally: no concerns      Mallampati score:  I    Obstruction: none      Neck mobility: normal      Pharmacologic strategy: RSI      Induction agents:  None    Paralytics:  None  Procedure details:     Preoxygenation:  Supraglottic device    CPR in progress: no      Number of attempts:  1  Successful intubation attempt details:     Intubation method:  Oral    Intubation technique: direct      Laryngoscope blade:  Mac 4    Bougie used: yes      Grade view: I      Tube size (mm):  7.5    Tube type:  Cuffed    Tube visualized through cords: yes    Placement assessment:     Tube secured with:  ETT finnegan    Breath sounds:  Equal    Placement verification: chest rise, CXR verification, direct visualization, equal breath sounds, esophageal detector and numeric ETCO2      CXR findings:  Appropriate position  Post-procedure details:     Procedure completion:  Patient tolerated the procedure well with no immediate  complications    PROCEDURE    Patient Tolerance:  Patient tolerated the procedure well with no immediate complications       Discussion of Management   ICU physician, Dr. Nguyen    ED Course   ED Course as of 03/18/25 1855   Tue Mar 18, 2025   1716 I obtained the history and evaluated the patient as noted above.    1835 I rechecked and updated the patient.    1846 I spoke with Dr. Nguyen from ICU        Additional Documentation  None    Medical Decision Making / Diagnosis     CMS Diagnoses: Lactic acid elevated due to cardiopulmonary arrest. At this time there are no signs of sepsis or septic shock    MIPS   CT for PE was ordered because the patient is high risk for pulmonary embolism.    NICOLE Rivera is a 59 year old male brought in by EMS after he had an apparent cardiac arrest.  He had a good pulse upon arrival here, and his EKG shows atrial fibrillation but no signs of a STEMI.  Based on the history, this sounds more likely to be from a respiratory arrest subsequently causing a cardiac arrest.  His initial lactic acid did come back quite elevated, but I suspect that also is from the cardiopulmonary arrest rather than infection.  I subsequently intubated him per the above procedure note.  He initially did not require any sedation.  He went for CT of his head, chest, abdomen, and pelvis.  I was concerned that he could have an anoxic brain injury, intracranial hemorrhage, pulmonary embolism, or intra-abdominal pathology.  He does have some rib fractures that are likely from CPR as well as some small bilateral pleural effusions.  He also unfortunately has findings that would be consistent with an anoxic brain injury.  However he did subsequently start moving and required propofol for sedation.  Unfortunately that also then dropped his blood pressure, and he was subsequently started on peripheral Levophed, which brought up his blood pressure appropriately.  I subsequently spoke with the ICU  physician, Dr. Nguyen, who will be admitting the patient to the ICU.    Disposition   The patient was admitted to the hospital.     Critical care time (excluding procedures): 35 minutes    Diagnosis     ICD-10-CM    1. Cardiopulmonary arrest (H)  I46.9       2. Anoxic brain damage (H)  G93.1       3. Closed fracture of multiple ribs, unspecified laterality, initial encounter  S22.49XA       4. Bilateral pleural effusions  J90       5. Chronic a-fib (H)  I48.20              Scribe Disclosure:  Obie ARCHIBALD, am serving as a scribe at 5:18 PM on 3/18/2025 to document services personally performed by Mir Jacobson MD based on my observations and the provider's statements to me.       Mir Jacobson MD  03/18/25 [id number]

## 2025-03-19 ENCOUNTER — HOSPITAL ENCOUNTER (INPATIENT)
Dept: NEUROLOGY | Facility: CLINIC | Age: 60
Discharge: HOME | End: 2025-03-19
Attending: INTERNAL MEDICINE
Payer: COMMERCIAL

## 2025-03-19 LAB
ALBUMIN SERPL BCG-MCNC: 2.9 G/DL (ref 3.5–5.2)
ALP SERPL-CCNC: 107 U/L (ref 40–150)
ALT SERPL W P-5'-P-CCNC: 40 U/L (ref 0–70)
ANION GAP SERPL CALCULATED.3IONS-SCNC: 5 MMOL/L (ref 7–15)
AST SERPL W P-5'-P-CCNC: ABNORMAL U/L
ATRIAL RATE - MUSE: 59 BPM
ATRIAL RATE - MUSE: 72 BPM
BASE EXCESS BLDV CALC-SCNC: 17.3 MMOL/L (ref -3–3)
BASE EXCESS BLDV CALC-SCNC: 20 MMOL/L (ref -3–3)
BASE EXCESS BLDV CALC-SCNC: 21.4 MMOL/L (ref -3–3)
BASE EXCESS BLDV CALC-SCNC: 21.8 MMOL/L (ref -3–3)
BILIRUB SERPL-MCNC: 0.2 MG/DL
BUN SERPL-MCNC: 25.6 MG/DL (ref 8–23)
CALCIUM SERPL-MCNC: 8.2 MG/DL (ref 8.8–10.4)
CHLORIDE SERPL-SCNC: 94 MMOL/L (ref 98–107)
CREAT SERPL-MCNC: 0.69 MG/DL (ref 0.67–1.17)
DIASTOLIC BLOOD PRESSURE - MUSE: NORMAL MMHG
DIASTOLIC BLOOD PRESSURE - MUSE: NORMAL MMHG
EGFRCR SERPLBLD CKD-EPI 2021: >90 ML/MIN/1.73M2
ERYTHROCYTE [DISTWIDTH] IN BLOOD BY AUTOMATED COUNT: 14.4 % (ref 10–15)
GLUCOSE BLDC GLUCOMTR-MCNC: 114 MG/DL (ref 70–99)
GLUCOSE BLDC GLUCOMTR-MCNC: 190 MG/DL (ref 70–99)
GLUCOSE BLDC GLUCOMTR-MCNC: 193 MG/DL (ref 70–99)
GLUCOSE BLDC GLUCOMTR-MCNC: 97 MG/DL (ref 70–99)
GLUCOSE SERPL-MCNC: 105 MG/DL (ref 70–99)
HCO3 BLDV-SCNC: 49 MMOL/L (ref 21–28)
HCO3 BLDV-SCNC: 49 MMOL/L (ref 21–28)
HCO3 BLDV-SCNC: 51 MMOL/L (ref 21–28)
HCO3 BLDV-SCNC: 52 MMOL/L (ref 21–28)
HCO3 SERPL-SCNC: 44 MMOL/L (ref 22–29)
HCT VFR BLD AUTO: 31.2 % (ref 40–53)
HGB BLD-MCNC: 9 G/DL (ref 13.3–17.7)
INTERPRETATION ECG - MUSE: NORMAL
INTERPRETATION ECG - MUSE: NORMAL
LACTATE SERPL-SCNC: 0.7 MMOL/L (ref 0.7–2)
LACTATE SERPL-SCNC: 2.4 MMOL/L (ref 0.7–2)
LACTATE SERPL-SCNC: 2.4 MMOL/L (ref 0.7–2)
LACTATE SERPL-SCNC: 2.8 MMOL/L (ref 0.7–2)
MAGNESIUM SERPL-MCNC: 2.2 MG/DL (ref 1.7–2.3)
MCH RBC QN AUTO: 28.1 PG (ref 26.5–33)
MCHC RBC AUTO-ENTMCNC: 28.8 G/DL (ref 31.5–36.5)
MCV RBC AUTO: 98 FL (ref 78–100)
O2/TOTAL GAS SETTING VFR VENT: 60 %
O2/TOTAL GAS SETTING VFR VENT: 70 %
O2/TOTAL GAS SETTING VFR VENT: 80 %
O2/TOTAL GAS SETTING VFR VENT: 90 %
OXYHGB MFR BLDV: 49 % (ref 70–75)
OXYHGB MFR BLDV: 50 % (ref 70–75)
OXYHGB MFR BLDV: 63 % (ref 70–75)
OXYHGB MFR BLDV: 86 % (ref 70–75)
P AXIS - MUSE: 63 DEGREES
P AXIS - MUSE: NORMAL DEGREES
PCO2 BLDV: 87 MM HG (ref 40–50)
PCO2 BLDV: 89 MM HG (ref 40–50)
PCO2 BLDV: 94 MM HG (ref 40–50)
PCO2 BLDV: 95 MM HG (ref 40–50)
PH BLDV: 7.32 [PH] (ref 7.32–7.43)
PH BLDV: 7.35 [PH] (ref 7.32–7.43)
PH BLDV: 7.36 [PH] (ref 7.32–7.43)
PH BLDV: 7.36 [PH] (ref 7.32–7.43)
PHOSPHATE SERPL-MCNC: 3.1 MG/DL (ref 2.5–4.5)
PLATELET # BLD AUTO: 225 10E3/UL (ref 150–450)
PO2 BLDV: 29 MM HG (ref 25–47)
PO2 BLDV: 29 MM HG (ref 25–47)
PO2 BLDV: 36 MM HG (ref 25–47)
PO2 BLDV: 56 MM HG (ref 25–47)
POTASSIUM SERPL-SCNC: 4.3 MMOL/L (ref 3.4–5.3)
PR INTERVAL - MUSE: 154 MS
PR INTERVAL - MUSE: NORMAL MS
PROT SERPL-MCNC: 6.1 G/DL (ref 6.4–8.3)
QRS DURATION - MUSE: 102 MS
QRS DURATION - MUSE: 86 MS
QT - MUSE: 388 MS
QT - MUSE: 424 MS
QTC - MUSE: 409 MS
QTC - MUSE: 419 MS
R AXIS - MUSE: 87 DEGREES
R AXIS - MUSE: 97 DEGREES
RBC # BLD AUTO: 3.2 10E6/UL (ref 4.4–5.9)
SAO2 % BLDV: 49.6 % (ref 70–75)
SAO2 % BLDV: 51.5 % (ref 70–75)
SAO2 % BLDV: 64.3 % (ref 70–75)
SAO2 % BLDV: 87.4 % (ref 70–75)
SODIUM SERPL-SCNC: 143 MMOL/L (ref 135–145)
SYSTOLIC BLOOD PRESSURE - MUSE: NORMAL MMHG
SYSTOLIC BLOOD PRESSURE - MUSE: NORMAL MMHG
T AXIS - MUSE: 79 DEGREES
T AXIS - MUSE: 81 DEGREES
TROPONIN T SERPL HS-MCNC: 23 NG/L
VENTRICULAR RATE- MUSE: 59 BPM
VENTRICULAR RATE- MUSE: 67 BPM
WBC # BLD AUTO: 12.9 10E3/UL (ref 4–11)

## 2025-03-19 PROCEDURE — 999N000052 EEG VIDEO 2-12 HRS UNMONITORED

## 2025-03-19 PROCEDURE — 82805 BLOOD GASES W/O2 SATURATION: CPT

## 2025-03-19 PROCEDURE — 93005 ELECTROCARDIOGRAM TRACING: CPT

## 2025-03-19 PROCEDURE — 87070 CULTURE OTHR SPECIMN AEROBIC: CPT | Performed by: SURGERY

## 2025-03-19 PROCEDURE — 84484 ASSAY OF TROPONIN QUANT: CPT | Performed by: EMERGENCY MEDICINE

## 2025-03-19 PROCEDURE — 80069 RENAL FUNCTION PANEL: CPT | Performed by: SURGERY

## 2025-03-19 PROCEDURE — 83605 ASSAY OF LACTIC ACID: CPT

## 2025-03-19 PROCEDURE — 36415 COLL VENOUS BLD VENIPUNCTURE: CPT | Performed by: EMERGENCY MEDICINE

## 2025-03-19 PROCEDURE — 999N000052 EEG AWAKE OR DROWSY ROUTINE

## 2025-03-19 PROCEDURE — 258N000003 HC RX IP 258 OP 636: Performed by: PHYSICIAN ASSISTANT

## 2025-03-19 PROCEDURE — 272N000452 HC KIT SHRLOCK 5FR POWER PICC TRIPLE LUMEN

## 2025-03-19 PROCEDURE — G0463 HOSPITAL OUTPT CLINIC VISIT: HCPCS

## 2025-03-19 PROCEDURE — 250N000009 HC RX 250: Performed by: INTERNAL MEDICINE

## 2025-03-19 PROCEDURE — 250N000011 HC RX IP 250 OP 636

## 2025-03-19 PROCEDURE — 250N000009 HC RX 250: Performed by: EMERGENCY MEDICINE

## 2025-03-19 PROCEDURE — 250N000013 HC RX MED GY IP 250 OP 250 PS 637

## 2025-03-19 PROCEDURE — 999N000157 HC STATISTIC RCP TIME EA 10 MIN

## 2025-03-19 PROCEDURE — 36569 INSJ PICC 5 YR+ W/O IMAGING: CPT

## 2025-03-19 PROCEDURE — 82310 ASSAY OF CALCIUM: CPT

## 2025-03-19 PROCEDURE — 87205 SMEAR GRAM STAIN: CPT | Performed by: SURGERY

## 2025-03-19 PROCEDURE — 999N000147 HC STATISTIC PT IP EVAL DEFER: Performed by: PHYSICAL THERAPIST

## 2025-03-19 PROCEDURE — 36415 COLL VENOUS BLD VENIPUNCTURE: CPT

## 2025-03-19 PROCEDURE — 94640 AIRWAY INHALATION TREATMENT: CPT | Mod: 76

## 2025-03-19 PROCEDURE — 250N000011 HC RX IP 250 OP 636: Mod: JZ | Performed by: INTERNAL MEDICINE

## 2025-03-19 PROCEDURE — 99291 CRITICAL CARE FIRST HOUR: CPT | Performed by: INTERNAL MEDICINE

## 2025-03-19 PROCEDURE — 84100 ASSAY OF PHOSPHORUS: CPT | Performed by: SURGERY

## 2025-03-19 PROCEDURE — 85027 COMPLETE CBC AUTOMATED: CPT

## 2025-03-19 PROCEDURE — 250N000011 HC RX IP 250 OP 636: Performed by: SURGERY

## 2025-03-19 PROCEDURE — 95711 VEEG 2-12 HR UNMONITORED: CPT

## 2025-03-19 PROCEDURE — 258N000003 HC RX IP 258 OP 636: Performed by: INTERNAL MEDICINE

## 2025-03-19 PROCEDURE — 95816 EEG AWAKE AND DROWSY: CPT

## 2025-03-19 PROCEDURE — 200N000001 HC R&B ICU

## 2025-03-19 PROCEDURE — 83735 ASSAY OF MAGNESIUM: CPT | Performed by: SURGERY

## 2025-03-19 PROCEDURE — 250N000009 HC RX 250: Performed by: SURGERY

## 2025-03-19 PROCEDURE — 84155 ASSAY OF PROTEIN SERUM: CPT

## 2025-03-19 PROCEDURE — 94003 VENT MGMT INPAT SUBQ DAY: CPT

## 2025-03-19 PROCEDURE — 95718 EEG PHYS/QHP 2-12 HR W/VEEG: CPT | Performed by: PSYCHIATRY & NEUROLOGY

## 2025-03-19 PROCEDURE — 94640 AIRWAY INHALATION TREATMENT: CPT

## 2025-03-19 PROCEDURE — 272N000272 HC CONTINUOUS NEBULIZER MICRO PUMP

## 2025-03-19 RX ORDER — PIPERACILLIN SODIUM, TAZOBACTAM SODIUM 4; .5 G/20ML; G/20ML
4.5 INJECTION, POWDER, LYOPHILIZED, FOR SOLUTION INTRAVENOUS EVERY 6 HOURS
Status: DISCONTINUED | OUTPATIENT
Start: 2025-03-19 | End: 2025-03-22

## 2025-03-19 RX ORDER — SODIUM CHLORIDE, SODIUM LACTATE, POTASSIUM CHLORIDE, CALCIUM CHLORIDE 600; 310; 30; 20 MG/100ML; MG/100ML; MG/100ML; MG/100ML
INJECTION, SOLUTION INTRAVENOUS CONTINUOUS
Status: ACTIVE | OUTPATIENT
Start: 2025-03-19 | End: 2025-03-23

## 2025-03-19 RX ORDER — ENOXAPARIN SODIUM 100 MG/ML
40 INJECTION SUBCUTANEOUS EVERY 24 HOURS
Status: DISCONTINUED | OUTPATIENT
Start: 2025-03-19 | End: 2025-03-26

## 2025-03-19 RX ORDER — MAGNESIUM SULFATE HEPTAHYDRATE 40 MG/ML
2 INJECTION, SOLUTION INTRAVENOUS ONCE
Status: COMPLETED | OUTPATIENT
Start: 2025-03-19 | End: 2025-03-19

## 2025-03-19 RX ORDER — LIDOCAINE 40 MG/G
CREAM TOPICAL
Status: ACTIVE | OUTPATIENT
Start: 2025-03-19 | End: 2025-03-22

## 2025-03-19 RX ORDER — DEXTROSE MONOHYDRATE AND SODIUM CHLORIDE 5; .45 G/100ML; G/100ML
INJECTION, SOLUTION INTRAVENOUS CONTINUOUS
Status: DISCONTINUED | OUTPATIENT
Start: 2025-03-19 | End: 2025-03-19

## 2025-03-19 RX ORDER — NICOTINE POLACRILEX 4 MG
15-30 LOZENGE BUCCAL
Status: DISCONTINUED | OUTPATIENT
Start: 2025-03-19 | End: 2025-03-26

## 2025-03-19 RX ORDER — IPRATROPIUM BROMIDE AND ALBUTEROL SULFATE 2.5; .5 MG/3ML; MG/3ML
3 SOLUTION RESPIRATORY (INHALATION)
Status: DISCONTINUED | OUTPATIENT
Start: 2025-03-19 | End: 2025-03-23

## 2025-03-19 RX ORDER — METHYLPREDNISOLONE SODIUM SUCCINATE 40 MG/ML
40 INJECTION INTRAMUSCULAR; INTRAVENOUS EVERY 24 HOURS
Status: DISCONTINUED | OUTPATIENT
Start: 2025-03-19 | End: 2025-03-25

## 2025-03-19 RX ORDER — DEXTROSE MONOHYDRATE 25 G/50ML
25-50 INJECTION, SOLUTION INTRAVENOUS
Status: DISCONTINUED | OUTPATIENT
Start: 2025-03-19 | End: 2025-03-26

## 2025-03-19 RX ADMIN — IPRATROPIUM BROMIDE AND ALBUTEROL SULFATE 3 ML: .5; 3 SOLUTION RESPIRATORY (INHALATION) at 15:30

## 2025-03-19 RX ADMIN — Medication 40 MG: at 08:32

## 2025-03-19 RX ADMIN — METHYLPREDNISOLONE SODIUM SUCCINATE 40 MG: 40 INJECTION, POWDER, FOR SOLUTION INTRAMUSCULAR; INTRAVENOUS at 12:19

## 2025-03-19 RX ADMIN — IPRATROPIUM BROMIDE AND ALBUTEROL SULFATE 3 ML: .5; 3 SOLUTION RESPIRATORY (INHALATION) at 11:51

## 2025-03-19 RX ADMIN — CHLORHEXIDINE GLUCONATE 15 ML: 1.2 RINSE ORAL at 08:32

## 2025-03-19 RX ADMIN — PIPERACILLIN AND TAZOBACTAM 4.5 G: 4; .5 INJECTION, POWDER, FOR SOLUTION INTRAVENOUS at 16:15

## 2025-03-19 RX ADMIN — LIDOCAINE HYDROCHLORIDE 2 ML: 10 INJECTION, SOLUTION EPIDURAL; INFILTRATION; INTRACAUDAL; PERINEURAL at 09:35

## 2025-03-19 RX ADMIN — IPRATROPIUM BROMIDE AND ALBUTEROL SULFATE 3 ML: .5; 3 SOLUTION RESPIRATORY (INHALATION) at 20:05

## 2025-03-19 RX ADMIN — PROPOFOL 35 MCG/KG/MIN: 10 INJECTION, EMULSION INTRAVENOUS at 03:30

## 2025-03-19 RX ADMIN — PROPOFOL 10 MCG/KG/MIN: 10 INJECTION, EMULSION INTRAVENOUS at 12:15

## 2025-03-19 RX ADMIN — DEXTROSE AND SODIUM CHLORIDE: 5; .45 INJECTION, SOLUTION INTRAVENOUS at 12:12

## 2025-03-19 RX ADMIN — MAGNESIUM SULFATE HEPTAHYDRATE 2 G: 40 INJECTION, SOLUTION INTRAVENOUS at 00:42

## 2025-03-19 RX ADMIN — ENOXAPARIN SODIUM 40 MG: 40 INJECTION SUBCUTANEOUS at 12:19

## 2025-03-19 RX ADMIN — CHLORHEXIDINE GLUCONATE 15 ML: 1.2 RINSE ORAL at 20:12

## 2025-03-19 RX ADMIN — SODIUM CHLORIDE, SODIUM LACTATE, POTASSIUM CHLORIDE, AND CALCIUM CHLORIDE: .6; .31; .03; .02 INJECTION, SOLUTION INTRAVENOUS at 22:57

## 2025-03-19 RX ADMIN — NOREPINEPHRINE BITARTRATE 0.04 MCG/KG/MIN: 16 SOLUTION INTRAVENOUS at 07:27

## 2025-03-19 RX ADMIN — PIPERACILLIN AND TAZOBACTAM 4.5 G: 4; .5 INJECTION, POWDER, FOR SOLUTION INTRAVENOUS at 22:05

## 2025-03-19 ASSESSMENT — ACTIVITIES OF DAILY LIVING (ADL)
ADLS_ACUITY_SCORE: 83
ADLS_ACUITY_SCORE: 83
ADLS_ACUITY_SCORE: 77
ADLS_ACUITY_SCORE: 83
ADLS_ACUITY_SCORE: 77
ADLS_ACUITY_SCORE: 83
ADLS_ACUITY_SCORE: 79
ADLS_ACUITY_SCORE: 79
ADLS_ACUITY_SCORE: 83
ADLS_ACUITY_SCORE: 83
ADLS_ACUITY_SCORE: 75
ADLS_ACUITY_SCORE: 83
ADLS_ACUITY_SCORE: 75
ADLS_ACUITY_SCORE: 79
ADLS_ACUITY_SCORE: 83

## 2025-03-19 NOTE — PROGRESS NOTES
[facility] ICU RESPIRATORY NOTE        Date of Admission: 3/18/2025    Date of Intubation (most recent): 3/18/25    Reason for Mechanical Ventilation: Cardiac arrest    Number of Days on Mechanical Ventilation: 2    Met Criteria for Spontaneous Breathing Trial: No    Reason for No Spontaneous Breathing Trial: PEEP 10    Significant Events Today: Nebs started per MD orders. BS diminished.    ABG Results:   Recent Labs   Lab 03/19/25  1115 03/19/25  0520 03/19/25  0246 03/19/25  0028 03/18/25  2202 03/18/25  2142 03/18/25  1821   PH  --   --   --   --  7.39  --  7.30*   PCO2  --   --   --   --  80*  --  87*   PO2  --   --   --   --  70*  --  57*   HCO3  --   --   --   --  48*  --  42*   O2PER 60 80 90 70 70   < >  --     < > = values in this interval not displayed.         Current Vent Settings: FiO2 (%): 60 %, Resp: 19, Vent Mode: CMV/AC, Resp Rate (Set): 18 breaths/min, Tidal Volume (Set, mL): 360 mL, PEEP (cm H2O): 10 cmH2O, Resp Rate (Set): 18 breaths/min, Tidal Volume (Set, mL): 360 mL, PEEP (cm H2O): 10 cmH2O    Skin Assessment: Intact    Plan: Will cont full vent support for now and will assess for weaning readiness daily,    Rand Servin RT on 3/19/2025 at 5:04 PM

## 2025-03-19 NOTE — ED NOTES
"Assumed care for the patient. Patient had \"jerking\"movements while on 25 of prop. MD updated and PRN 4 mg Versed order added.   "

## 2025-03-19 NOTE — PROGRESS NOTES
Comprehensive Daily ICU Note        Miguel Rivera MRN# [id number]   Age: 59 year old YOB: 1965     Date of Admission: 3/18/2025    Primary care provider: No Ref-Primary, Physician     CODE STATUS: Full      Problem List:   Active Problems:    Anoxic brain damage (H)    Cardiopulmonary arrest (H)    Closed fracture of multiple ribs, unspecified laterality, initial encounter            Subjective/ Last 24 hours:   Events: 59-year-old man with COPD hypertension kidney disease and chronic hypercapnick and hypoxemic respiratory failure requiring oxygen who was found unresponsive by his family with disconnected oxygen.  Unknown downtime.  EMS found patient in asystole.  Had return of spontaneous circulation approximately 30 minutes after EMS arrival.  Admitted to ICU.  Since admission to ICU has been relatively clinically unchanged.  Off sedation opens eyes spontaneously but does not track does not blink to threat.  Purulent secretions and hypoxic           Physical Examination:       Temp:  [96.9  F (36.1  C)-100.4  F (38  C)] 100.2  F (37.9  C)  Pulse:  [] 110  Resp:  [7-30] 17  BP: ()/() 148/89  FiO2 (%):  [50 %-100 %] 50 %  SpO2:  [86 %-100 %] 97 %  General: Stated age intubated  HEENT: ET and OG with purulent ET tube secretions  Lungs: Synchronous with ventilator but high peak pressures  CVS: Regular rate rhythm with systolic murmur  Abdomen: Obese  Extremities/musculoskeletal/skin: Grossly normal  Neurology/Psychiatry: Eyes open spontaneously pupils symmetric and small does not respond to pain does not blink to threat no extraneous movements  Exam of Line sites: No lines             Medications:     Current Facility-Administered Medications   Medication Dose Route Frequency Provider Last Rate Last Admin    chlorhexidine (PERIDEX) 0.12 % solution 15 mL  15 mL Mouth/Throat Q12H Annette Taylor PA-C   15 mL at 03/19/25 0832    pantoprazole (PROTONIX) 2 mg/mL suspension 40 mg   40 mg Per Feeding Tube QAM  Annette Taylor PA-C   40 mg at 03/19/25 0832    Or    pantoprazole (PROTONIX) IV push injection 40 mg  40 mg Intravenous QAM  Annette Taylor PA-C        sodium chloride (PF) 0.9% PF flush 10-40 mL  10-40 mL Intracatheter Q7 Days Adriel Meléndez MD   10 mL at 03/19/25 1103    sodium chloride (PF) 0.9% PF flush 10-40 mL  10-40 mL Intracatheter Daily Adriel Meléndez MD   10 mL at 03/19/25 1103        Current Facility-Administered Medications   Medication Dose Route Frequency Provider Last Rate Last Admin    fentaNYL (SUBLIMAZE) infusion   mcg/hr Intravenous Continuous Annette Taylor PA-C   Stopped at 03/19/25 0900    propofol (DIPRIVAN) infusion  5-75 mcg/kg/min Intravenous Continuous Annette Taylor PA-C 6.2 mL/hr at 03/19/25 1100 10 mcg/kg/min at 03/19/25 1100    And    Medication Instruction   Does not apply Continuous PRN Annette Taylor PA-C        norepinephrine (LEVOPHED) 4 mg in  mL PERIPHERAL infusion  0.01-0.125 mcg/kg/min Intravenous Continuous Mir Jacobson MD   Stopped at 03/19/25 1000    sodium chloride 0.9 % infusion   Intravenous Continuous Adriel Meléndez MD 30 mL/hr at 03/19/25 0836 Rate Verify at 03/19/25 0836       Current Facility-Administered Medications   Medication Dose Route Frequency Provider Last Rate Last Admin    acetaminophen (TYLENOL) tablet 650 mg  650 mg Oral Q6H PRN Annette Taylor PA-C        Or    acetaminophen (TYLENOL) oral liquid 650 mg  650 mg Per NG tube Q6H PRN Annette Taylor PA-C        albuterol (PROVENTIL) neb solution 2.5 mg  2.5 mg Nebulization Q4H PRN Annette Taylor PA-C        glucose gel 15-30 g  15-30 g Oral Q15 Min PRN Schwinghammer, Annette, PA-C        Or    dextrose 50 % injection 25-50 mL  25-50 mL Intravenous Q15 Min PRN Annette Taylor PA-C        Or    glucagon injection 1 mg  1 mg Subcutaneous Q15 Min PRN  Annette Taylor PA-C        fentaNYL (SUBLIMAZE) 50 mcg/mL bolus from pump  25 mcg Intravenous Q1H PRN Annette Taylor PA-C        HYDROmorphone (DILAUDID) injection 0.2 mg  0.2 mg Intravenous Q2H PRN Annette Taylor PA-C        lidocaine (LMX4) cream   Topical Q1H PRN Adriel Meléndez MD        lidocaine 1 % 0.1-5 mL  0.1-5 mL Other Q1H PRN Adriel Meléndez MD   2 mL at 03/19/25 0935    propofol (DIPRIVAN) bolus from bag or syringe pump  10 mg Intravenous Q15 Min PRN Annette Taylor PA-C        And    Medication Instruction   Does not apply Continuous PRN Annette Taylor PA-C        [Held by provider] midazolam (VERSED) injection 4 mg  4 mg Intravenous Q1H PRN Mir Jacobson MD   4 mg at 03/18/25 1924    naloxone (NARCAN) injection 0.2 mg  0.2 mg Intravenous Q2 Min PRN John Ahuja RPH        Or    naloxone (NARCAN) injection 0.4 mg  0.4 mg Intravenous Q2 Min PRN John Ahuja RPH        Or    naloxone (NARCAN) injection 0.2 mg  0.2 mg Intramuscular Q2 Min PRN John Ahuja RPH        Or    naloxone (NARCAN) injection 0.4 mg  0.4 mg Intramuscular Q2 Min PRN John Ahuja RPH        ondansetron (ZOFRAN ODT) ODT tab 4 mg  4 mg Oral Q6H PRN Annette Taylor PA-C        Or    ondansetron (ZOFRAN) injection 4 mg  4 mg Intravenous Q6H PRN Annette Taylor PA-C        polyethylene glycol (MIRALAX) Packet 17 g  17 g Oral Daily PRN Annette Taylor PA-C        senna-docusate (SENOKOT-S/PERICOLACE) 8.6-50 MG per tablet 1 tablet  1 tablet Oral BID PRN Annette Taylor PA-C        Or    senna-docusate (SENOKOT-S/PERICOLACE) 8.6-50 MG per tablet 2 tablet  2 tablet Oral BID PRN Annette Taylor PA-C        sodium chloride (PF) 0.9% PF flush 10-20 mL  10-20 mL Intracatheter q1 min prn Adriel Meléndez MD             Imaging and Other Data:        Echocardiogram  Complete  [id number]  [id number]  [id number]^[id number]^SOURAV     [facility]  Echocardiography Laboratory  [address] [place] [address]     Name: ANGEL PENA  MRN: [id number]  : 1965  Study Date: 2025 11:20 PM  Age: 59 yrs  Gender: Male  Patient Location: [facility]  Reason For Study: Cardiac Arrest  Ordering Physician: SOURAV POE  Performed By: Ramin Walker     BSA: 2.0 m2  Height: 62 in  Weight: 230 lb  HR: 77  BP: 134/87 mmHg  ______________________________________________________________________________  Procedure  Echocardiogram with two-dimensional, color and spectral Doppler. Definity (NDC  #[id number]) given intravenously.  ______________________________________________________________________________  Interpretation Summary     The left ventricle is normal in size.  Left ventricular systolic function is normal.  The visual ejection fraction is 55-60%.  Normal left ventricular wall motion  The right ventricle is normal in structure, function and size.  Mild valvular aortic stenosis. The mean AoV pressure gradient is 13mmHg.  The rhythm was atrial fibrillation.  There is no comparison study available.  ______________________________________________________________________________  Left Ventricle  The left ventricle is normal in size. There is normal left ventricular wall  thickness. Left ventricular systolic function is normal. The visual ejection  fraction is 55-60%. Diastolic Doppler findings (E/E' ratio and/or other  parameters) suggest left ventricular filling pressures are indeterminate.  Normal left ventricular wall motion.     Right Ventricle  The right ventricle is normal in structure, function and size.     Atria  Normal left atrial size. Right atrial size is normal. There is no atrial shunt  seen.     Mitral Valve  The mitral valve is normal in structure and function. There is trace mitral  regurgitation.     Tricuspid Valve  The  tricuspid valve is normal in structure and function. There is trace  tricuspid regurgitation. Right ventricular systolic pressure could not be  approximated due to inadequate tricuspid regurgitation.     Aortic Valve  There is trace aortic regurgitation. Mild valvular aortic stenosis. The mean  AoV pressure gradient is 13mmHg.     Pulmonic Valve  The pulmonic valve is not well seen, but is grossly normal. There is trace  pulmonic valvular regurgitation.     Vessels  Normal size aorta.     Pericardium  Trivial posterior pericardial effusion.     Rhythm  The rhythm was atrial fibrillation.  ______________________________________________________________________________  MMode/2D Measurements & Calculations     IVSd: 1.1 cm  LVIDd: 5.1 cm  LVIDs: 2.9 cm  LVPWd: 1.2 cm  FS: 43.1 %  LV mass(C)d: 227.4 grams  LV mass(C)dI: 112.1 grams/m2  Ao root diam: 3.0 cm  asc Aorta Diam: 3.4 cm  LVOT diam: 2.2 cm  LVOT area: 3.8 cm2  Ao root diam index Ht(cm/m): 1.9  Ao root diam index BSA (cm/m2): 1.5  Asc Ao diam index BSA (cm/m2): 1.7  Asc Ao diam index Ht(cm/m): 2.2  LA Volume (BP): 53.4 ml     LA Volume Index (BP): 26.3 ml/m2  RV Base: 4.5 cm  RWT: 0.47  TAPSE: 2.0 cm     Doppler Measurements & Calculations  MV E max wally: 92.2 cm/sec  MV dec slope: 289.0 cm/sec2  MV dec time: 0.32 sec  Ao V2 max: 220.0 cm/sec  Ao max P.0 mmHg  Ao V2 mean: 171.0 cm/sec  Ao mean P.0 mmHg  Ao V2 VTI: 39.5 cm  ROBERT(I,D): 2.0 cm2  ROBERT(V,D): 2.1 cm2  LV V1 max P.9 mmHg  LV V1 max: 121.0 cm/sec  LV V1 VTI: 20.3 cm  SV(LVOT): 77.2 ml  SI(LVOT): 38.0 ml/m2  PA acc time: 0.09 sec  AV Wally Ratio (DI): 0.55  ROBERT Index (cm2/m2): 0.96     E/E' av.1  Lateral E/e': 9.0  Medial E/e': 9.2  RV S Wally: 12.9 cm/sec     ______________________________________________________________________________  Report approved by: Olivier Garcia MD on 2025 01:27 AM                   Assessment and Plan:     Summary:  Miguel Rivera is a 59 year old male  admitted on 3/18/2025 for cardiac arrest.  I have personally reviewed the daily labs, imaging studies, cultures and discussed the case with referring physician and consulting physicians. My assessment and plan as follows:    Neurology and Psychiatry:  Assessment:   Current Facility-Administered Medications   Medication Dose Route Frequency Provider Last Rate Last Admin    fentaNYL (SUBLIMAZE) infusion   mcg/hr Intravenous Continuous Annette Taylor PA-C   Stopped at 03/19/25 0900    propofol (DIPRIVAN) infusion  5-75 mcg/kg/min Intravenous Continuous Annette Taylor PA-C 6.2 mL/hr at 03/19/25 1100 10 mcg/kg/min at 03/19/25 1100    And    Medication Instruction   Does not apply Continuous PRN Annette Taylor PA-C        norepinephrine (LEVOPHED) 4 mg in  mL PERIPHERAL infusion  0.01-0.125 mcg/kg/min Intravenous Continuous Mir Jacobson MD   Stopped at 03/19/25 1000    sodium chloride 0.9 % infusion   Intravenous Continuous Adriel Meléndez MD 30 mL/hr at 03/19/25 0836 Rate Verify at 03/19/25 0836       Metabolic and likely anoxic encephalopathy.  Unknown downtime and asystole on presentation.  Head CT with suggestion of anoxic injury.  However patient is opening eyes spontaneously.      Plan   Resume propofol.  Stop fentanyl.  Routine EEG  Consult neurocritical care tomorrow.    Cardiovascular and Hemodynamics:  Assessment:  Temp:  [96.9  F (36.1  C)-100.4  F (38  C)] 100.2  F (37.9  C)  Pulse:  [] 110  Resp:  [7-30] 17  BP: ()/() 148/89  FiO2 (%):  [50 %-100 %] 50 %  SpO2:  [86 %-100 %] 97 %   Intake/Output Summary (Last 24 hours) at 3/19/2025 1119  Last data filed at 3/19/2025 1100  Gross per 24 hour   Intake 954.38 ml   Output 1925 ml   Net -970.62 ml     Echo result w/o MOPS: Interpretation Summary The left ventricle is normal in size.Left ventricular systolic function is normal.The visual ejection fraction is 55-60%.Normal left ventricular wall  motionThe right ventricle is normal in structure, function and size.Mild valvular aortic stenosis. The mean AoV pressure gradient is 13mmHg.The rhythm was atrial fibrillation.There is no comparison study available.        Vasoplegic/cardiogenic shock.  This is improved.  Asystolic arrest.  From history respiratory as a primary source seems most likely.  EKG not consistent with STEMI.  Troponin minimally elevated  Medical problems include hypertension dyslipidemia and atrial fibrillation and diastolic heart failure and tricuspid regurgitation.  Blood pressures now trending higher   ongoing ectopy.  Further cardiac arrest would result in catastrophic brain injury    Plan  Monitor hemodynamics closely.  Initiate antihypertensive if necessary.  There is no indication for evaluation for coronary artery disease  Discuss CODE STATUS with family.    Pulmonary/ID:  Assessment  Temp (24hrs), Av.9  F (37.2  C), Min:96.9  F (36.1  C), Max:100.4  F (38  C)    FiO2 (%): 50 %, Resp: 17, Vent Mode: CMV/AC, Resp Rate (Set): 18 breaths/min, Tidal Volume (Set, mL): 360 mL, PEEP (cm H2O): 10 cmH2O, Resp Rate (Set): 18 breaths/min, Tidal Volume (Set, mL): 360 mL, PEEP (cm H2O): 10 cmH2O Arterial Blood Gas  Recent Labs   Lab 25  0520 25  0246 25  0028 25  2202 25  2142 25  1821   PH  --   --   --  7.39  --  7.30*   PCO2  --   --   --  80*  --  87*   PO2  --   --   --  70*  --  57*   HCO3  --   --   --  48*  --  42*   O2PER 80 90 70 70   < >  --     < > = values in this interval not displayed.     pH Venous   Date Value Ref Range Status   2025 7.32 7.32 - 7.43 Final   2025 7.36 7.32 - 7.43 Final   2025 7.35 7.32 - 7.43 Final     pCO2 Venous   Date Value Ref Range Status   2025 95 (HH) 40 - 50 mm Hg Final   2025 89 (HH) 40 - 50 mm Hg Final   2025 94 (HH) 40 - 50 mm Hg Final         Acute on chronic hypoxic and hypercapnic respiratory failure.  pH 7.3 pCO2 90  suggesting profound chronic hypercapnia.  Likely aspiration pneumonia: Low-grade fever, leukocytosis, purulent secretions, gram-positive cocci in sputum    plan:  Initiate antibiotics   continue bronchodilators  Start steroids  Vap precautions: HOB 30, chlorhexidine    GI and Nutrition:  Assessment:  AST   Date Value Ref Range Status   03/19/2025   Final     Comment:     Unsatisfactory specimen - hemolyzed     03/18/2025 71 (H) 0 - 45 U/L Final     ALT   Date Value Ref Range Status   03/19/2025 40 0 - 70 U/L Final   03/18/2025 48 0 - 70 U/L Final     Bilirubin Total   Date Value Ref Range Status   03/19/2025 0.2 <=1.2 mg/dL Final   03/18/2025 0.2 <=1.2 mg/dL Final     Protein Total   Date Value Ref Range Status   03/19/2025 6.1 (L) 6.4 - 8.3 g/dL Final   03/18/2025 6.9 6.4 - 8.3 g/dL Final     Albumin   Date Value Ref Range Status   03/19/2025 2.9 (L) 3.5 - 5.2 g/dL Final   03/18/2025 3.4 (L) 3.5 - 5.2 g/dL Final     Alkaline Phosphatase   Date Value Ref Range Status   03/19/2025 107 40 - 150 U/L Final   03/18/2025 134 40 - 150 U/L Final     Transaminitis likely secondary to arrest given elevated lactic acid.  Would expect this would improve  Nutritional Plan: Orders Placed This Encounter      NPO for Medical/Clinical Reasons Except for: No Exceptions        Plan:   Monitor   Stress ulcer: PPI   initiate tube feeds tomorrow pending clinical trajectory    Renal, Fluid and Electrolytes:  Assessment:  BMP  Recent Labs   Lab 03/19/25  0520 03/18/25  2142 03/18/25  1728    142 144   POTASSIUM 4.3 4.3 4.6   CHLORIDE 94* 92* 93*   CO2 44* 40* 36*   ANIONGAP 5* 10 15   BUN 25.6* 19.6 15.8   CR 0.69 0.67 0.68   GFRESTIMATED >90 >90 >90   LOLA 8.2* 8.9 9.5   MAG 2.2 1.8 2.3   PHOS 3.1 4.0  --      Lactic Acid:  Lactic Acid   Date Value Ref Range Status   03/19/2025 2.4 (H) 0.7 - 2.0 mmol/L Final   03/19/2025 2.8 (H) 0.7 - 2.0 mmol/L Final   03/19/2025 2.4 (H) 0.7 - 2.0 mmol/L Final     No results found for:  "\"KETONE\"        Intake/Output Summary (Last 24 hours) at 3/19/2025 1119  Last data filed at 3/19/2025 1100  Gross per 24 hour   Intake 954.38 ml   Output 1925 ml   Net -970.62 ml    Net IO Since Admission: -970.62 mL [25 1119]    Respiratory acidosis with metabolic compensation.  Urine output adequate    Plan:   Maintenance IV fluid for today  monitor I/O, urine output, kidney function and electrolytes and treat as needed. Avoid nephrotoxic agents.    Infectious Disease:  Assessment:  Temp (24hrs), Av.9  F (37.2  C), Min:96.9  F (36.1  C), Max:100.4  F (38  C)    7-Day Micro Results       Collected Updated Procedure Result Status      2025 0426 2025 1036 Respiratory Aerobic Bacterial Culture with Gram Stain [[id number]]   (Abnormal)   Aspirate from Endotracheal    Preliminary result Component Value   Gram Stain Result >25 PMNs/low power field  [P]     4+ Gram positive bacilli resembling diphtheroids  [P]     1+ Gram positive cocci  [P]                2025 2207 2025 0102 Respiratory Aerobic Bacterial Culture with Gram Stain [[id number]]   (Abnormal)   Sputum from Endotracheal    Preliminary result Component Value   Gram Stain Result >25 PMNs/low power field  [P]     3+ Gram positive bacilli resembling diphtheroids  [P]     1+ Gram positive cocci  [P]                2025 1728 2025 1116 Blood Culture Peripheral Blood [[id number]]   Peripheral Blood    Preliminary result Component Value   Culture No growth after 12 hours  [P]                2025 1728 2025 0808 Blood Culture Peripheral Blood [[id number]]   Peripheral Blood    Preliminary result Component Value   Culture No growth after 12 hours  [P]                      See respiratory section        Hematology and Oncology:  Assessment:  Recent Labs   Lab 25  0520 25  2142 25  1728   WBC 12.9* 10.9 10.1   RBC 3.20* 3.68* 3.46*   HGB 9.0* 10.4* 9.9*   HCT 31.2* 36.9* 36.7*   MCV 98 100 " "106*   MCH 28.1 28.3 28.6   MCHC 28.8* 28.2* 27.0*   RDW 14.4 14.4 14.3    256 238     No lab results found in last 7 days.  No results found for: \"AAUFH\"      1.  Anemia.  No signs of bleeding  2. Leukocytosis secondary to stress and likely aspiration pneumonia.     Plan:  Treat with antibiotics   anticoagulation:  Enoxaparin     Endocrinology and Rheumatology:  Assessment:  Stress hyperglycemia  Recent Labs   Lab 25  0520 25  0423 25  2142 25  2052 25  1728   * 97 137* 130* 165*      Plan:  Insulin if necessary.  Will give some glucose and fluids since is n.p.o.    Skin and Musculoskeletal:  Assessment:  Standard skin cares        Billin minutes spent for critical care time exclusive of procedures        Family update: Pending          Clinically Significant Risk Factors     # Obesity: Estimated body mass index is 39.92 kg/m  as calculated from the following:    Height as of this encounter: 1.575 m (5' 2\").    Weight as of this encounter: 99 kg (218 lb 4.1 oz).         [facility] ICU Rounding checklist    Assessment of central venous catheter access Central access present and needed   Assessment of urinary catheter use [place] present and needed.  Indication:  obstruction and hemodynamic instability   DVT prophylaxis Therapeutic anticoagulation (see A/P)         This document was generated with the assistance of voice recognition software. Unintentional transcription errors may occur. Please contact the author for any clarification.   Kendra Matt MD        "

## 2025-03-19 NOTE — PLAN OF CARE
"Physical Therapy/Occupational Therapy: Orders received. Chart reviewed pt not appropriate for rehab services, per notes pt \"Does not respond to deep painful stimuli, no cough/gag, no corneal reflex. Pupils fixed.\"? Will complete orders.    "

## 2025-03-19 NOTE — CONSULTS
"[facility]  WOC Nurse Inpatient Assessment     Consulted for: Wound Pressure Injury Sacrum     Summary: patient with hospital acquired purple discoloration noted by nursing, upon woc assessment noted blanchable erythema, woc signing off 3/19, please reconsult as needed     WOC nurse follow-up plan: signing off    Patient History (according to provider note(s):      \"59-year-old man with COPD hypertension kidney disease and chronic hypercapnick and hypoxemic respiratory failure requiring oxygen who was found unresponsive by his family with disconnected oxygen.  Unknown downtime.  EMS found patient in asystole.  Had return of spontaneous circulation approximately 30 minutes after EMS arrival.  Admitted to ICU.  Since admission to ICU has been relatively clinically unchanged.  Off sedation opens eyes spontaneously but does not track does not blink to threat.  Purulent secretions and hypoxic \"    Assessment:      Areas visualized during today's visit: Focused: and Sacrum/coccyx    Wound location: buttock, sacrum, gluteal cleft     Last photo: 3/19  Wound due to:  none, patient without wound area noted upon woc assessment, previous area of nonblanchable maroon now blanches. Also noted tiny (less than 0.5cm x 0.5cm x 0.1cm)  suspected pimple vs unknown etiology at apex of gluteal cleft.   Wound history/plan of care: found with sacral mepilex in use   Wound base:  Blanchable , Erythema, and Epidermis,  (Dermis to 0.1cm x 0.1cm at apex of gluteal cleft)      Palpation of the wound bed: normal  (0.1cm x 0.1cm dot was firm to palpation)      Drainage: none     Description of drainage: none     Measurements (length x width x depth, in cm): none, no focal wound area       Tunneling: N/A     Undermining: N/A  Periwound skin: Intact      Color: normal and consistent with surrounding tissue      Temperature: normal   Odor: none  Pain: no grimacing or signs of discomfort, none  Pain interventions prior to " "dressing change: patient tolerated well  Treatment goal: Maintain (prevention of deterioration) and Protection  STATUS: initial assessment  Supplies ordered: supplies stored on unit and discussed with RN applied to patient        Treatment Plan:     03/19/25 1430  Skin care precautions  EFFECTIVE NOW        Comments: Pressure Injury Prevention (PIP) Plan:  If patient is declining pressure injury prevention interventions: Explore reason why and address patient's concerns, Educate on pressure injury risk and prevention intervention(s), If patient is still declining, document \"informed refusal\" , and Ensure Care team is aware ( provider, charge nurse, etc)  Mattress: Follow bed algorithm, add Low Air Loss (Air+) mattress pump if skin is very moist or constantly moist.  HOB: Maintain at or below 30 degrees, unless contraindicated  Repositioning in bed: Every 1-2 hours , Left/right positioning; avoid supine, Raise foot of bed prior to raising head of bed, to reduce patient sliding down (shear), and Frequent microturns using positioning wedges, as patient tolerates  Heels: Keep elevated off mattress and Pillows under calves  Protective Dressing: Sacral Mepilex for prevention (#114406),  especially for the agitated patient  Positioning Equipment:Positioning wedges (#482378) to help maintain 30 degree side lying position  Chair positioning: Chair cushion (#159734) , Assist patient to reposition hourly, and Do NOT use a donut for sitting (this increases pressure to smaller area and creates a higher potential for injury)    If patient has a buttock pressure injury, or high risk for PI use chair cushion or SPS.  Moisture Management: Perineal cleansing /protection: Follow Incontinence Protocol, Avoid brief in bed, Clean and dry skin folds with bathing , Consider InterDry (#116350) between folds, and Moisturize dry skin  Under Devices: Inspect skin under all medical devices during skin inspection , Ensure tubes are stabilized " without tension, and Ensure patient is not lying on medical devices or equipment when repositioned  Ask provider to discontinue device when no longer needed.          Orders: Written    RECOMMEND PRIMARY TEAM ORDER: None, at this time  Education provided: importance of repositioning, plan of care, wound progress, and Off-loading pressure  Discussed plan of care with: Patient and Nurse  Notify [facility] if wound(s) deteriorate.  Nursing to notify the Provider(s) and re-consult the [facility] Nurse if new skin concern.    DATA:     Current support surface: Standard  Low air loss (DISHA pump, Isolibrium, Pulsate)  Containment of urine/stool: Incontinence Protocol, Incontinent pad in bed, and Indwelling catheter  BMI: Body mass index is 39.92 kg/m .   Active diet order: Orders Placed This Encounter      NPO for Medical/Clinical Reasons Except for: No Exceptions     Output: I/O last 3 completed shifts:  In: 660.52 [I.V.:610.52; IV Piggyback:50]  Out: 1725 [Urine:1675; Emesis/NG output:50]     Labs:   Recent Labs   Lab 03/19/25  0520   ALBUMIN 2.9*   HGB 9.0*   WBC 12.9*     Pressure injury risk assessment:   Sensory Perception: 1-->completely limited  Moisture: 3-->occasionally moist  Activity: 1-->bedfast  Mobility: 1-->completely immobile  Nutrition: 2-->probably inadequate  Friction and Shear: 1-->problem  Anthony Score: 9    Karen CWOCN   1st choice: Securely message with [employer] ([facility] [employer])   (2nd option: [facility] Office Phone [phone], messages checked periodically Mon-Fri 8a-4p)

## 2025-03-19 NOTE — H&P
ICU H&P    ASSESSMENT: Miguel Rivera is a 59 year old male with PMH of acute on chronic hypoxemic and hypercapnic respiratory failure with Bipap dependence, COPD, HTN, HLD, Stage III CKD, RV diastolic dysfunction, Tricuspid Regurgitation, and Type II DM . Presented to [facility] ED via EMS after unwitnessed cardiac arrest with lastn know well at 1608. Per report patient was found unresponsive by family with his usual oxygen disconnected/malfunctioning tubing and 911 contacted at 1631, CPR was initiated and patient found to be in asystole. Patient received Epinephrine x3, Bicarbonate x1 with subsequent PEA and ROSC with re-establishment of a pulse at 1702. EKG on arrival to ED demonstrated Atrial Fibrillation per report.    Arrives to the ICU intubated and sedated on Propofol, requiring 0.08 Norepinephrine gtt for vasopressor support.     CO-MORBIDITIES:   Patient Active Problem List   Diagnosis    Anoxic brain damage (H)    Cardiopulmonary arrest (H)    Closed fracture of multiple ribs, unspecified laterality, initial encounter       PLAN:  Neuro/ pain/ sedation:  - Monitor neurological status. Notify the MD for any acute changes in exam.  #Acute pain  # Anoxic brain injury, concern for  - PRN Tylenol 650 mg q 6 hours for mild pain, Dilaudid 0.2 mg q 2 hours PRN for severe pain  - Infusion: Fentanyl with boluses for breakthrough pain  - Per ED report patient initially was not moving, but then subsequently required Propofol for sedation  - CT head resulted with some loss of gray-white matter differentiation involving bilateral basal ganglia, thalami, and potentially insular cortex, possibly as result of hypoxic/ischemic injury, no hemorrhage demonstrated    #Sedation for mechanical ventilation  - Gtt: Propofol while intubated  - Received 4 mg Versed PRN for 'jerking' movements in ED, appear to be myoclonus on exam  - RASS 0 to -1, wean sedation as able to obtain neurologic exam    Pulmonary care:   # Acute on  chronic hypoxemic and hypercapnic respiratory failure with Bipap dependence PTA now requiring mechanical ventilation  # COPD  # Possible respiratory arrest causing subsequent cardiac arrest  # Pleural effusions, bilateral  # Former Tobacco Use  Resp: 16, Vent Mode: CMV/AC, Resp Rate (Set): 22 breaths/min, Tidal Volume (Set, mL): 450 mL, PEEP (cm H2O): 7 cmH2O, Resp Rate (Set): 22 breaths/min, Tidal Volume (Set, mL): 450 mL, PEEP (cm H2O): 7 cmH2O   - Goal SpO2 > 90%  - Ventilator bundle. PST, wean to extubate when meets criteria.  - Elevated peak pressures on arrival, obtain STAT CXR and VBG/ABG, decreased TV, possible bronchoscopy for further evaluation  - Wheezing on exam on arrival, PRN Albuterol nebulization  - CT Chest resulted with opacities in bilateral lower lobes with volume loss, likely atelectasis, new right upper lobe nodule, small bilateral pleural effusions       - enlarged right paratracheal lymph nodes (not significantly changed since 2020)  - Hold PTA Trelegy while intubated  - Per chart review recent hospitalization for COPD exacerbation 1/24-2/3 where he was initiated on Bipap and Trelegy Ellipta    Cardiovascular:    # Cardiac Arrest, unwitnessed  # Hx HTN  # Hx HLD  # RV Diastolic Dysfunction  # Tricuspid Regurgitation  # Atrial Fibrillation  # Shock, undifferentiated  - NE gtt for vasopressor support, wean as able  - Monitor hemodynamic status   - Goal MAP >65  - Initiated on Levophed after Hypotensive with sedation  - Troponin resulted at 12 in ED,   - Hold PTA Atorvastatin, Furosemide  - STAT ECHO to evaluate cardiac function  - EKG on arrival    GI care / Nutrition:   # At risk for protein malnutrition  # Hernias, inguinal and periumbilical  # NPO  - NPO  - PPI for bowel prophylaxis, was on PTA  - Bowel regimen: MiraLAX, senna PRN  - Zofran PRN for nausea/vomiting  - Focal narrowing of ascending colon on CT, possible peristalsis vs underlying mass, follow up to review if patient has  had recent colonoscopy, otherwise may need one  - Place OGT    Renal / Fluids / Electrolytes:   # Stage III CKD  # Lactic Acidosis, downtrending  - No previous Creatinine levels on record, resulted at 0.68 in ED, repeat CMP on arrival  - Blount for Strict I/O, daily weights  - Replete lytes PRN per ICU protocol  - Lactic acid initially elevated at 11 and downtrending, repeat on arrival    Endocrine:    # Stress hyperglycemia, risk for  # Type II DM  - Monitor BG, 130 on arrival  - Goal BG <180 for optimal healing  - Per EMS BG measured in 130s at time of arrest    ID / Antibiotics:  # COPD exacerbation vs Sepsis, concern for  - Continue to monitor fever curve, WBC, and inflammatory markers as appropriate  - Patient afebrile, WBC 10.1, Pan cultured, obtain respiratory viral panel  - UA with negative Leuk esterase, negative nitrites  - Monitor for culture results    Heme:     # Anemia  # DVT prophylaxis  - CBC on arrival to monitor  - Hgb goal > 7.0  - Hemoglobin resulted at 9.9 in ED  - SCDs for DVT prophylaxis, consider initiation of chemical DVT prophylaxis tomorrow    MSK / Skin:  # Osteopenia, history of  # Fractures of Ribs  # Sacral wound  - Rib fractures thought possibly secondary to CPR received, CT Chest with acute mildly displaced right anterior fourth rib fracture, acute mnimially displaced left anterior second rib fracture, and mildly angulated right anterior second, fifth, sixth, seventh and eight rib fractures, mildly angulated left anterior third rib fracture  - WOC consult for sacral wound  - PT/OT when appropriate    Prophylaxis:     - Mechanical ppx for DVT  - Bowel regimen: PPI, MiraLAX, senna    Lines / Tubes / Drains:  - ETT  - PIV  - Blount  - Place OGT    Disposition:  - ICU    Patient seen, findings and plan discussed with ICU staff, Dr. Meléndez.    Total Critical Care time spent by me, excluding procedures, was 52 minutes.    Annette Taylor PA-C    Clinically Significant Risk Factors  "Present on Admission          # Hypochloremia: Lowest Cl = 93 mmol/L in last 2 days, will monitor as appropriate            # Circulatory Shock: required vasopressors within past 24 hours      # Acute Hypoxic Respiratory Failure: Documented O2 saturation < 90%. Continue supplemental oxygen as needed  # Acute Hypercapnic Respiratory Failure: based on venous blood gas results.  Continue supplemental oxygen and ventilatory support as indicated.   # Anemia: based on hgb <11       # Severe Obesity: Estimated body mass index is 41.88 kg/m  as calculated from the following:    Height as of this encounter: 1.575 m (5' 2\").    Weight as of this encounter: 103.9 kg (229 lb).           # Anemia: based on hgb <11       ==================================    HPI:   Miguel Rivera is a 59 year old male with PMH of acute on chronic hypoxemic and hypercapnic respiratory failure with Bipap dependence, COPD, HTN, HLD, Stage III CKD, RV diastolic dysfunction, Tricuspid Regurgitation, and Type II DM . Presented to [facility] ED via EMS after cardiac arrest with last know well at 1608. Per report patient was found unresponsive by family with his usual oxygen disconnected/malfunctioning tubing and 911 contacted at 1631, CPR was initiated and patient found to be in asystole. Patient received Epinephrine x3, Bicarbonate x1 with subsequent PEA and ROSC with re-establishment of a pulse at 1702. EKG on arrival to ED demonstrating Atrial Fibrillation per report.    Arrives to the ICU intubated and sedated on Propofol, requiring 0.08 Norepinephrine gtt for vasopressor support.     Unable to obtain further ROS secondary to sedation.      PAST MEDICAL HISTORY: No past medical history on file.    PAST SURGICAL HISTORY: No past surgical history on file.    FAMILY HISTORY: No family history on file.    SOCIAL HISTORY:   Social History     Tobacco Use    Smoking status: Every Day     Current packs/day: 1.00     Types: Cigarettes    Smokeless tobacco: " Never   Substance Use Topics    Alcohol use: Not on file         OBJECTIVE:  1. VITAL SIGNS:   Temp:  [96.9  F (36.1  C)] 96.9  F (36.1  C)  Pulse:  [] 72  Resp:  [7-25] 16  BP: ()/() 114/71  SpO2:  [89 %-99 %] 94 %    Resp: 16, Vent Mode: CMV/AC, Resp Rate (Set): 22 breaths/min, Tidal Volume (Set, mL): 450 mL, PEEP (cm H2O): 7 cmH2O, Resp Rate (Set): 22 breaths/min, Tidal Volume (Set, mL): 450 mL, PEEP (cm H2O): 7 cmH2O      2. INTAKE/ OUTPUT:   No intake/output data recorded.      3. PHYSICAL EXAMINATION:   General: sedated, intubated.  Neuro: sedated, pupils minimally reactive to light bilaterally, does not withdraw to pain, does not respond to verbal stimuli.  Resp: intubated, mechanically ventilated, lungs coarse to auscultation bilaterally with expiratory wheeze in right lobe.  CV: Regular rate and rhythm.  Abdomen: Protuberant but soft on palpation.  : Blount with light yellow urine in reservoir.  Extremities: warm and well perfused, DP and radial pulses palpable bilaterally. Erythematous area on sacrum (WOC consult placed). Bilateral LE with chronic appearing erythema, no exudates observed. Healing abrasion to RLE.      4. INVESTIGATIONS:   Arterial Blood Gases   Recent Labs   Lab 03/18/25  1821   PH 7.30*   PCO2 87*   PO2 57*   HCO3 42*     Complete Blood Count   Recent Labs   Lab 03/18/25  1728   WBC 10.1   HGB 9.9*        Basic Metabolic Panel  Recent Labs   Lab 03/18/25  1728      POTASSIUM 4.6   CHLORIDE 93*   CO2 36*   BUN 15.8   CR 0.68   *     Liver Function Tests  No lab results found in last 7 days.  Pancreatic Enzymes  No lab results found in last 7 days.  Coagulation Profile  No lab results found in last 7 days.      5. RADIOLOGY:   Recent Results (from the past 24 hours)   XR Chest Port 1 View    Narrative    EXAM: XR CHEST PORT 1 VIEW  LOCATION: [facility]  DATE: 3/18/2025    INDICATION: Post intubation.  COMPARISON: 1/24/2025.       Impression    IMPRESSION: Gregorio is somewhat difficult to visualize in this patient. Endotracheal tube tip is approximately 3-4 cm above the gregorio. Enteric tube tip below the diaphragm. Mild cardiac enlargement and mild increased pulmonary vascularity. No acute   appearing infiltrates or consolidation. Mild aortic calcification. No significant bony abnormalities.   CT Head w/o Contrast    Narrative    EXAM: CT HEAD W/O CONTRAST  LOCATION: [facility]  DATE: 3/18/2025    INDICATION: Follow-up post cardiac arrest  COMPARISON: CT head 4/13/2020  TECHNIQUE: Routine CT Head without IV contrast. Multiplanar reformats. Dose reduction techniques were used.    FINDINGS:  INTRACRANIAL CONTENTS: No intracranial hemorrhage, extraaxial collection, or mass effect.  Some loss of gray-white matter differentiation involving the bilateral basal ganglia, thalami, and potentially some additional loss of gray-white matter   differentiation involving the insular cortex compared to the previous CT. Minor low-attenuation within the periventricular white matter similar to the previous exam. Unchanged, age-appropriate ventricles and sulci.     VISUALIZED ORBITS/SINUSES/MASTOIDS: No intraorbital abnormality. Minor mucosal thickening involving the ethmoid septa. No middle ear or mastoid effusion.    BONES/SOFT TISSUES: No acute abnormality.      Impression    IMPRESSION:  1.  Some loss of gray-white matter differentiation involving the bilateral basal ganglia, thalami, and potentially the insular cortex compared to the previous exam. Hypoxic/ischemic injury in these locations is possible. The extent of ischemic injuries   would be more accurately evaluated by MRI.  2.  No hemorrhage, extra-axial collection, or mass effect.     CT Chest PE Abdomen Pelvis w Contrast   Result Value    Radiologist flags      New right upper lobe 1.1 cm nodule; consider PET/CT versus tissue sampling. Short focal narrowing of the  ascending colon; consider colonoscopy.    Narrative    EXAM: CT CHEST PE ABDOMEN PELVIS W CONTRAST  LOCATION: [facility]  DATE: 3/18/2025    INDICATION: Cardiac arrest.  COMPARISON: CTA chest 4/13/2020  TECHNIQUE: CT chest pulmonary angiogram and routine CT abdomen pelvis with IV contrast. Arterial phase through the chest and venous phase through the abdomen and pelvis. Multiplanar reformats and MIP reconstructions were performed. Dose reduction   techniques were used.   CONTRAST: 114 mL Isovue 370    FINDINGS:  ANGIOGRAM CHEST: Pulmonary arteries are normal caliber and negative for pulmonary emboli. Mild aortic calcifications. Thoracic aorta is negative for dissection. No CT evidence of right heart strain.     LUNGS AND PLEURA: Confluent opacities in the bilateral lower lobes, with associated volume loss, most likely atelectasis. No focal airspace disease. Moderate upper lobe predominant centrilobular emphysema. Redemonstrated varicoid bronchiectasis in the   right middle lobe and right lower lobe. New right upper lobe solid nodule measuring 1.1 x 1.0 cm (series 6, image 61). Small bilateral pleural effusions. No pneumothorax.    MEDIASTINUM/AXILLAE: Enlarged right paratracheal lymph nodes, measuring up to 1.5 cm in short axis, not significantly changed since 2020. Few borderline enlarged paraortic lymph nodes, likely reactive. No additional lymphadenopathy. Cardiomegaly. No   pericardial effusion. Enteric tube coursing through the esophagus.    CORONARY ARTERY CALCIFICATION: Moderate.    HEPATOBILIARY: Normal.    PANCREAS: Normal.    SPLEEN: Normal.    ADRENAL GLANDS: Normal.    KIDNEYS/BLADDER: Few low-attenuation lesions in both kidneys, with the larger ones compatible with simple renal cysts for which no follow-up is indicated and the smaller ones too small to characterize. No hydronephrosis. Urinary bladder appears   unremarkable.    BOWEL: Enteric tube with tip in the stomach. Short  focal narrowing of the ascending colon, measuring 2.8 cm in length (series 13, image 40). No bowel obstruction or evidence of bowel inflammation. Small bowel appears normal. Normal appendix. No evidence   of acute appendicitis.    LYMPH NODES: No lymphadenopathy.    VASCULATURE: Moderate atherosclerotic calcifications.    PELVIC ORGANS: Prostatic calcifications.    MUSCULOSKELETAL: Acute mildly displaced right anterior fourth rib fracture. Acute minimally displaced left anterior second rib fracture. Mildly angulated right anterior second, third, fifth, sixth, seventh, and eighth rib fractures, likely acute. Mildly   angulated left anterior third rib fracture, likely acute. Multilevel degenerative changes of the spine. Small fat-containing periumbilical hernia. Small fat-containing left inguinal hernia.      Impression    IMPRESSION:  1.  No acute pulmonary embolism.    2.  Small bilateral pleural effusions. Confluent bilateral lower lobe opacities, with associated volume loss, most likely atelectasis.    3.  Acute bilateral rib fractures. No pneumothorax.    4.  New right upper lobe 1.1 cm nodule. Consider PET/CT versus tissue sampling.    5.  No acute findings in the abdomen or pelvis.    6.  Short 2.8 cm focal narrowing of the ascending colon, possibly peristalsis, although underlying mass not excluded. Consider correlation with colonoscopy.        [Consider Follow Up: New right upper lobe 1.1 cm nodule; consider PET/CT versus tissue sampling. Short focal narrowing of the ascending colon; consider colonoscopy.]    This report will be copied to the [facility] to ensure a provider acknowledges the finding.          =========================================

## 2025-03-19 NOTE — PLAN OF CARE
Goal Outcome Evaluation:      Plan of Care Reviewed With: sibling    Overall Patient Progress: no changeOverall Patient Progress: no change    Outcome Evaluation: Pt remained intubated. Levo weaned off this am. Prop and fent off. EEG placed. Pt pupils responsive this evening. Family updated      Problem: Mechanical Ventilation Invasive  Goal: Effective Communication  Outcome: Not Progressing  Intervention: Ensure Effective Communication  Recent Flowsheet Documentation  Taken 3/19/2025 1600 by Dinora Mclean RN  Trust Relationship/Rapport: care explained  Taken 3/19/2025 1200 by Dinora Mclean RN  Trust Relationship/Rapport: care explained  Taken 3/19/2025 0800 by Dinora Mclean RN  Trust Relationship/Rapport: care explained  Goal: Optimal Device Function  Outcome: Not Progressing  Intervention: Optimize Device Care and Function  Recent Flowsheet Documentation  Taken 3/19/2025 1600 by Dinora Mclean RN  Oral Care:   oral rinse provided   suction provided  Taken 3/19/2025 1200 by Dinora Mclean RN  Oral Care: oral rinse provided  Taken 3/19/2025 0800 by Dinora Mclean RN  Oral Care: oral rinse provided  Goal: Mechanical Ventilation Liberation  Outcome: Not Progressing  Goal: Optimal Nutrition Delivery  Outcome: Not Progressing  Goal: Absence of Device-Related Skin and Tissue Injury  Outcome: Not Progressing  Goal: Absence of Ventilator-Induced Lung Injury  Outcome: Not Progressing  Intervention: Prevent Ventilator-Associated Pneumonia  Recent Flowsheet Documentation  Taken 3/19/2025 1800 by Dinora Mclean RN  Head of Bed (HOB) Positioning: HOB at 30 degrees  Taken 3/19/2025 1600 by Dinora Mclean RN  Oral Care:   oral rinse provided   suction provided  Head of Bed (HOB) Positioning: HOB at 30 degrees  Taken 3/19/2025 1400 by Dinora Mclean RN  Head of Bed (HOB) Positioning: HOB at 30 degrees  Taken 3/19/2025 1200 by Dinora Mclean RN  Oral Care: oral rinse provided  Head of Bed (HOB)  Positioning: HOB at 30 degrees  Taken 3/19/2025 1000 by Dinora Mclean, RN  Head of Bed ([facility]) Positioning: HOB at 30 degrees  Taken 3/19/2025 0800 by Dinora Mclean, RN  Oral Care: oral rinse provided  Head of Bed ([facility]) Positioning: HOB at 30 degrees

## 2025-03-19 NOTE — PLAN OF CARE
Goal Outcome Evaluation:      Plan of Care Reviewed With: sibling    Overall Patient Progress: no changeOverall Patient Progress: no change    Outcome Evaluation: Pt remains intubated, low dose sedation/pain infusion which helps the myoclonic appearing mvmts. Does not respond to deep painful stimuli, no cough/gag, no corneal reflex. Pupils fixed at 2. Opens eyes intermittently, spontaneously. Initially had copious urine output (notified Dr Meléndez) and now trending down. Norepi max dose 0.08 mcg upon arrival to ICU fm ED. Able to titrate off for a period. Now back on low dose at 0.04 mcg. Serial VBGs unchanged with CO2 in the 90's et bicarb ~50. Upon discussion with intensivist, likely CO2 is nearly at pt's baseline. Echo complete. EF 55-60% with trace regurgitation of most valves, RV/LV nl. Labs et echo seem to contradict cited history. In addition to no diabetic, htn or cholesterol meds listed on home med profile. BNP minimal, troponin minimally elevated considering CPR. Mg replaced per protocol x 1. Spoke w/brother, Martínez, as listed as primary contact. Discussed current physical assessment in line w/CT head showing loss of gray/white matter differentiation. Informed of plan for today of neuro consult, likely MRI. Verbalized understanding with sadness. Reinforce as needed.  Discussed with Martínez pamphlet waiting for [facility] website as they are a family of 9-10 siblings. Pt not , no children. [facility] referral made et came to see pt. Requested PICC line as serial labs et lab having difficulty obtaining samples. IV team to place today. Report to day RN.

## 2025-03-19 NOTE — PROCEDURES
[facility]    Triple Lumen PICC Placement    Date/Time: 3/19/2025 9:24 AM    Performed by: Rand Yoo RN  Authorized by: Adriel Meléndez MD  Indications: vascular access      UNIVERSAL PROTOCOL   Site Marked: Yes  Prior Images Obtained and Reviewed:  Yes  Required items: Required blood products, implants, devices and special equipment available    Patient identity confirmed:  Verbally with patient, arm band and hospital-assigned identification number  NA - No sedation, light sedation, or local anesthesia  Confirmation Checklist:  Patient's identity using two indicators, relevant allergies, procedure was appropriate and matched the consent or emergent situation and correct equipment/implants were available  Time out: Immediately prior to the procedure a time out was called    Universal Protocol: the Joint Commission Universal Protocol was followed    Preparation: Patient was prepped and draped in usual sterile fashion       ANESTHESIA    Local Anesthetic:  Lidocaine 1% without epinephrine  Anesthetic Total (mL):  2      SEDATION    Patient Sedated: No        Skin prep agent: skin prep agent completely dried prior to procedure  Sterile barriers: maximum sterile barriers were used: cap, mask, sterile gown, sterile gloves, and large sterile sheet  Hand hygiene: hand hygiene performed prior to central venous catheter insertion  Type of line used: PICC  Catheter type: triple lumen  Lumen type: valved and power PICC  Lumen Identification: Gray, White and Red  Catheter size: 5 Fr  Brand: Bard  Lot number: [id number]  Placement method: MST, ultrasound and venipuncture  Number of attempts: 1  Difficulty threading catheter: no  Successful placement: yes  Orientation: right    Location: brachial vein (lateral)  Tip Location: SVC  Arm circumference: adults 10 cm  Extremity circumference: 30  Visible catheter length: 0  Total catheter length: 39  Internal Lumen Volume: 39  mL    Dressing and securement: chlorhexidine patch applied, subcutaneous anchor securement system, transparent securement dressing, transparent dressing, sterile dressing applied, pressure dressing, line secured, securement device and site cleansed  Post procedure assessment: blood return through all ports and placement verified by x-ray  PROCEDURE Describe Procedure: Nursing note  Successfully place triple lumen PICC line on the right brachial vein on one attempt with good blood return noted. Tip location confirmed with 3CG Coridon confirmation tip system. PICC line is ready for use.   Disposal: sharps and needle count correct at the end of procedure, needles and guidewire disposed in sharps container  Patient Tolerance:  Patient tolerated the procedure well with no immediate complications

## 2025-03-19 NOTE — PROGRESS NOTES
[facility] ICU RESPIRATORY NOTE  Date of Admission: 3/18/25  Date of Intubation (most recent): 3/18/25  Reason for Mechanical Ventilation: Cardiac arrest  Number of Days on Mechanical Ventilation: 2  Met Criteria for Pressure Support Trial: No  Length of Pressure Support Trial:    Reason for Stopping Pressure Support Trial:  Reason for No Pressure Support Trial: Per Md     Significant Events Today: Pt arrived in ICU intubated. CT done     ABG Results:   Recent Labs   Lab 03/19/25  0246 03/19/25  0028 03/18/25  2202 03/18/25  2142 03/18/25  1821   PH  --   --  7.39  --  7.30*   PCO2  --   --  80*  --  87*   PO2  --   --  70*  --  57*   HCO3  --   --  48*  --  42*   O2PER 90 70 70 2  --      FiO2 (%): 85 %, Resp: 23, Vent Mode: CMV/AC, Resp Rate (Set): 18 breaths/min, Tidal Volume (Set, mL): 360 mL, PEEP (cm H2O): 10 cmH2O, Resp Rate (Set): 18 breaths/min, Tidal Volume (Set, mL): 360 mL, PEEP (cm H2O): 10 cmH2O    ABIGAIL Infante, RRT

## 2025-03-19 NOTE — PROGRESS NOTES
Initial Referral:       Thank you for consulting [facility] to evaluate this patient for potential organ, tissue, and eye donation. [facility] will connect in person daily for updates and review the Electronic Medical Record until either the patient improves, or end of life decisions are made. Please connect with [facility] ([phone]) for the following:       Patient has a neuro decline or becomes unstable       Patient is to be tested for brain death        Family mentions donation       Family Care Conference anticipated       Family expresses end of life decision       Prior to extubation or moves to comfort cares       Currently there is no contraindication to donation. As always, [facility] [place] an indication of prognosis but satisfies CMS conditions of participation for timely referral.       Referral Number:  [id number]      Thank you for the referral of this patient

## 2025-03-20 ENCOUNTER — HOSPITAL ENCOUNTER (INPATIENT)
Dept: NEUROLOGY | Facility: CLINIC | Age: 60
Discharge: HOME | End: 2025-03-20
Attending: PSYCHIATRY & NEUROLOGY
Payer: COMMERCIAL

## 2025-03-20 LAB
ALBUMIN SERPL BCG-MCNC: 2.9 G/DL (ref 3.5–5.2)
ALP SERPL-CCNC: 91 U/L (ref 40–150)
ALT SERPL W P-5'-P-CCNC: 30 U/L (ref 0–70)
ANION GAP SERPL CALCULATED.3IONS-SCNC: 3 MMOL/L (ref 7–15)
AST SERPL W P-5'-P-CCNC: 60 U/L (ref 0–45)
BACTERIA ASPIRATE CULT: ABNORMAL
BACTERIA BLD CULT: NORMAL
BACTERIA BLD CULT: NORMAL
BACTERIA SPT CULT: ABNORMAL
BASE EXCESS BLDV CALC-SCNC: 18.9 MMOL/L (ref -3–3)
BASE EXCESS BLDV CALC-SCNC: 21.7 MMOL/L (ref -3–3)
BASOPHILS # BLD MANUAL: 0 10E3/UL (ref 0–0.2)
BASOPHILS NFR BLD MANUAL: 0 %
BILIRUB SERPL-MCNC: 0.2 MG/DL
BUN SERPL-MCNC: 30.5 MG/DL (ref 8–23)
CALCIUM SERPL-MCNC: 8.2 MG/DL (ref 8.8–10.4)
CHLORIDE SERPL-SCNC: 95 MMOL/L (ref 98–107)
CREAT SERPL-MCNC: 0.76 MG/DL (ref 0.67–1.17)
EGFRCR SERPLBLD CKD-EPI 2021: >90 ML/MIN/1.73M2
EOSINOPHIL # BLD MANUAL: 0 10E3/UL (ref 0–0.7)
EOSINOPHIL NFR BLD MANUAL: 0 %
ERYTHROCYTE [DISTWIDTH] IN BLOOD BY AUTOMATED COUNT: 14.6 % (ref 10–15)
FRAGMENTS BLD QL SMEAR: ABNORMAL
GLUCOSE BLDC GLUCOMTR-MCNC: 106 MG/DL (ref 70–99)
GLUCOSE BLDC GLUCOMTR-MCNC: 116 MG/DL (ref 70–99)
GLUCOSE BLDC GLUCOMTR-MCNC: 117 MG/DL (ref 70–99)
GLUCOSE BLDC GLUCOMTR-MCNC: 120 MG/DL (ref 70–99)
GLUCOSE BLDC GLUCOMTR-MCNC: 132 MG/DL (ref 70–99)
GLUCOSE BLDC GLUCOMTR-MCNC: 170 MG/DL (ref 70–99)
GLUCOSE SERPL-MCNC: 129 MG/DL (ref 70–99)
GRAM STAIN RESULT: ABNORMAL
HCO3 BLDV-SCNC: 48 MMOL/L (ref 21–28)
HCO3 BLDV-SCNC: 50 MMOL/L (ref 21–28)
HCO3 SERPL-SCNC: 45 MMOL/L (ref 22–29)
HCT VFR BLD AUTO: 30 % (ref 40–53)
HGB BLD-MCNC: 8.6 G/DL (ref 13.3–17.7)
LACTATE SERPL-SCNC: 0.8 MMOL/L (ref 0.7–2)
LYMPHOCYTES # BLD MANUAL: 1.1 10E3/UL (ref 0.8–5.3)
LYMPHOCYTES NFR BLD MANUAL: 9 %
MAGNESIUM SERPL-MCNC: 2.1 MG/DL (ref 1.7–2.3)
MCH RBC QN AUTO: 28.4 PG (ref 26.5–33)
MCHC RBC AUTO-ENTMCNC: 28.7 G/DL (ref 31.5–36.5)
MCV RBC AUTO: 99 FL (ref 78–100)
METAMYELOCYTES # BLD MANUAL: 0.2 10E3/UL
METAMYELOCYTES NFR BLD MANUAL: 2 %
MONOCYTES # BLD MANUAL: 1.5 10E3/UL (ref 0–1.3)
MONOCYTES NFR BLD MANUAL: 12 %
NEUTROPHILS # BLD MANUAL: 9.4 10E3/UL (ref 1.6–8.3)
NEUTROPHILS NFR BLD MANUAL: 77 %
O2/TOTAL GAS SETTING VFR VENT: 60 %
O2/TOTAL GAS SETTING VFR VENT: 60 %
OXYHGB MFR BLDV: 80 % (ref 70–75)
OXYHGB MFR BLDV: 90 % (ref 70–75)
PATH REV: ABNORMAL
PCO2 BLDV: 83 MM HG (ref 40–50)
PCO2 BLDV: 85 MM HG (ref 40–50)
PH BLDV: 7.35 [PH] (ref 7.32–7.43)
PH BLDV: 7.39 [PH] (ref 7.32–7.43)
PHOSPHATE SERPL-MCNC: 2.4 MG/DL (ref 2.5–4.5)
PLAT MORPH BLD: ABNORMAL
PLATELET # BLD AUTO: 240 10E3/UL (ref 150–450)
PO2 BLDV: 47 MM HG (ref 25–47)
PO2 BLDV: 60 MM HG (ref 25–47)
POTASSIUM SERPL-SCNC: 3.7 MMOL/L (ref 3.4–5.3)
PROT SERPL-MCNC: 6.1 G/DL (ref 6.4–8.3)
RBC # BLD AUTO: 3.03 10E6/UL (ref 4.4–5.9)
RBC MORPH BLD: ABNORMAL
SAO2 % BLDV: 81.7 % (ref 70–75)
SAO2 % BLDV: 91.4 % (ref 70–75)
SODIUM SERPL-SCNC: 143 MMOL/L (ref 135–145)
STOMATOCYTES BLD QL SMEAR: SLIGHT
WBC # BLD AUTO: 12.2 10E3/UL (ref 4–11)

## 2025-03-20 PROCEDURE — 250N000011 HC RX IP 250 OP 636

## 2025-03-20 PROCEDURE — 250N000009 HC RX 250: Performed by: INTERNAL MEDICINE

## 2025-03-20 PROCEDURE — 250N000012 HC RX MED GY IP 250 OP 636 PS 637: Performed by: PHYSICIAN ASSISTANT

## 2025-03-20 PROCEDURE — 250N000013 HC RX MED GY IP 250 OP 250 PS 637

## 2025-03-20 PROCEDURE — 94003 VENT MGMT INPAT SUBQ DAY: CPT

## 2025-03-20 PROCEDURE — 99291 CRITICAL CARE FIRST HOUR: CPT | Mod: 25 | Performed by: PSYCHIATRY & NEUROLOGY

## 2025-03-20 PROCEDURE — 999N000009 HC STATISTIC AIRWAY CARE

## 2025-03-20 PROCEDURE — 82805 BLOOD GASES W/O2 SATURATION: CPT | Performed by: INTERNAL MEDICINE

## 2025-03-20 PROCEDURE — 250N000013 HC RX MED GY IP 250 OP 250 PS 637: Performed by: SURGERY

## 2025-03-20 PROCEDURE — 80053 COMPREHEN METABOLIC PANEL: CPT | Performed by: SURGERY

## 2025-03-20 PROCEDURE — 94640 AIRWAY INHALATION TREATMENT: CPT

## 2025-03-20 PROCEDURE — 84100 ASSAY OF PHOSPHORUS: CPT | Performed by: SURGERY

## 2025-03-20 PROCEDURE — 999N000157 HC STATISTIC RCP TIME EA 10 MIN

## 2025-03-20 PROCEDURE — 95720 EEG PHY/QHP EA INCR W/VEEG: CPT | Performed by: PSYCHIATRY & NEUROLOGY

## 2025-03-20 PROCEDURE — 94640 AIRWAY INHALATION TREATMENT: CPT | Mod: 76

## 2025-03-20 PROCEDURE — 83605 ASSAY OF LACTIC ACID: CPT | Performed by: INTERNAL MEDICINE

## 2025-03-20 PROCEDURE — 200N000001 HC R&B ICU

## 2025-03-20 PROCEDURE — 99291 CRITICAL CARE FIRST HOUR: CPT | Performed by: INTERNAL MEDICINE

## 2025-03-20 PROCEDURE — 250N000011 HC RX IP 250 OP 636: Performed by: INTERNAL MEDICINE

## 2025-03-20 PROCEDURE — 83735 ASSAY OF MAGNESIUM: CPT | Performed by: SURGERY

## 2025-03-20 PROCEDURE — 85027 COMPLETE CBC AUTOMATED: CPT | Performed by: SURGERY

## 2025-03-20 PROCEDURE — 85007 BL SMEAR W/DIFF WBC COUNT: CPT | Performed by: SURGERY

## 2025-03-20 PROCEDURE — 95714 VEEG EA 12-26 HR UNMNTR: CPT

## 2025-03-20 PROCEDURE — 999N000287 HC ICU ADULT ROUNDING, EACH 10 MINS

## 2025-03-20 RX ORDER — POTASSIUM CHLORIDE 20MEQ/15ML
20 LIQUID (ML) ORAL ONCE
Status: COMPLETED | OUTPATIENT
Start: 2025-03-20 | End: 2025-03-20

## 2025-03-20 RX ADMIN — METHYLPREDNISOLONE SODIUM SUCCINATE 40 MG: 40 INJECTION, POWDER, FOR SOLUTION INTRAMUSCULAR; INTRAVENOUS at 12:53

## 2025-03-20 RX ADMIN — IPRATROPIUM BROMIDE AND ALBUTEROL SULFATE 3 ML: .5; 3 SOLUTION RESPIRATORY (INHALATION) at 07:45

## 2025-03-20 RX ADMIN — POTASSIUM CHLORIDE 20 MEQ: 20 SOLUTION ORAL at 05:57

## 2025-03-20 RX ADMIN — IPRATROPIUM BROMIDE AND ALBUTEROL SULFATE 3 ML: .5; 3 SOLUTION RESPIRATORY (INHALATION) at 20:04

## 2025-03-20 RX ADMIN — POTASSIUM & SODIUM PHOSPHATES POWDER PACK 280-160-250 MG 1 PACKET: 280-160-250 PACK at 14:32

## 2025-03-20 RX ADMIN — PIPERACILLIN AND TAZOBACTAM 4.5 G: 4; .5 INJECTION, POWDER, FOR SOLUTION INTRAVENOUS at 16:41

## 2025-03-20 RX ADMIN — ENOXAPARIN SODIUM 40 MG: 40 INJECTION SUBCUTANEOUS at 12:53

## 2025-03-20 RX ADMIN — IPRATROPIUM BROMIDE AND ALBUTEROL SULFATE 3 ML: .5; 3 SOLUTION RESPIRATORY (INHALATION) at 10:59

## 2025-03-20 RX ADMIN — POTASSIUM & SODIUM PHOSPHATES POWDER PACK 280-160-250 MG 1 PACKET: 280-160-250 PACK at 05:57

## 2025-03-20 RX ADMIN — IPRATROPIUM BROMIDE AND ALBUTEROL SULFATE 3 ML: .5; 3 SOLUTION RESPIRATORY (INHALATION) at 15:16

## 2025-03-20 RX ADMIN — PIPERACILLIN AND TAZOBACTAM 4.5 G: 4; .5 INJECTION, POWDER, FOR SOLUTION INTRAVENOUS at 04:51

## 2025-03-20 RX ADMIN — CHLORHEXIDINE GLUCONATE 15 ML: 1.2 RINSE ORAL at 21:26

## 2025-03-20 RX ADMIN — CHLORHEXIDINE GLUCONATE 15 ML: 1.2 RINSE ORAL at 07:58

## 2025-03-20 RX ADMIN — PROPOFOL 15 MCG/KG/MIN: 10 INJECTION, EMULSION INTRAVENOUS at 05:57

## 2025-03-20 RX ADMIN — INSULIN ASPART 1 UNITS: 100 INJECTION, SOLUTION INTRAVENOUS; SUBCUTANEOUS at 01:05

## 2025-03-20 RX ADMIN — POTASSIUM & SODIUM PHOSPHATES POWDER PACK 280-160-250 MG 1 PACKET: 280-160-250 PACK at 10:23

## 2025-03-20 RX ADMIN — Medication 40 MG: at 07:58

## 2025-03-20 RX ADMIN — PIPERACILLIN AND TAZOBACTAM 4.5 G: 4; .5 INJECTION, POWDER, FOR SOLUTION INTRAVENOUS at 21:26

## 2025-03-20 RX ADMIN — PIPERACILLIN AND TAZOBACTAM 4.5 G: 4; .5 INJECTION, POWDER, FOR SOLUTION INTRAVENOUS at 10:23

## 2025-03-20 ASSESSMENT — ACTIVITIES OF DAILY LIVING (ADL)
ADLS_ACUITY_SCORE: 79

## 2025-03-20 NOTE — PROGRESS NOTES
[facility] ICU RESPIRATORY NOTE        Date of Admission: 3/18/2025    Date of Intubation (most recent): 3/18/2025    Reason for Mechanical Ventilation: Cardiac Arrest    Number of Days on Mechanical Ventilation: 3    Met Criteria for Spontaneous Breathing Trial: No    Bite Block: No    Significant Events Today: None    ABG Results:   Recent Labs   Lab 03/20/25  1302 03/20/25  0441 03/19/25  1115 03/19/25  0520 03/19/25  0028 03/18/25  2202 03/18/25  2142 03/18/25  1821   PH  --   --   --   --   --  7.39  --  7.30*   PCO2  --   --   --   --   --  80*  --  87*   PO2  --   --   --   --   --  70*  --  57*   HCO3  --   --   --   --   --  48*  --  42*   O2PER 60 60 60 80   < > 70   < >  --     < > = values in this interval not displayed.         Current Vent Settings: FiO2 (%): 60 %, Resp: 12, Vent Mode: CMV/AC, Resp Rate (Set): 10 breaths/min, Tidal Volume (Set, mL): 450 mL, PEEP (cm H2O): 10 cmH2O, Resp Rate (Set): 10 breaths/min, Tidal Volume (Set, mL): 450 mL, PEEP (cm H2O): 10 cmH2O        Tai Ferguson, RT on 3/20/2025 at 6:03 PM

## 2025-03-20 NOTE — CONSULTS
[facility]    Stroke Consult Note    Reason for Consult:  Cardiac Arrest    Chief Complaint: Cardiac Arrest       HPI  Miguel Rivera is a 59 year old male with a PMH of acute on chronic hypoxemic and hypercapnic repsiratory failure, BiPAP dependence, COPD, CKD 3, HTN, HLD, RV diastolic dysfunction, tricuspid regurgitation, DM2 who presented to the ED for cardiac arrest. The patient's LKW was 1608 on 3/18 via phone call and EMS was called at 1631 and the EMS providers arrived quickly and started CPR. In the ED pulse was reestablished at 1702. Per the chart, when he is off sedation he opens his eyes spontaneously but does not blink to threat or track.     In seeing him today he was not alert to auditory or painful stimuli. His corneal and gag reflexes were positive, but his oculu-cephalic reflex was negative. He was not tracking or opening his eyes. I saw no spontaneous movement in any of his extremities. His pupils were round, equal, and reactive to light. He withdrew slightly to painful stimuli in the upper extremities and exhibited triple flexion in his lower extremities in response to painful stimuli. No grimace was observed to painful stimuli in the face.     Stroke Evaluation Summarized    MRI/Head CT Brain MRI pending  Head CT: Some loss of gray-white matter differentiation involving the bilateral basal ganglia, thalami, and potentially the insular cortex compared to the previous exam. No hemorrhage, extra-axial collection, or mass effect.    Intracranial Vasculature    Cervical Vasculature      Echocardiogram    EKG/Telemetry    Other Testing Continuous EEG     LDL  No lab value available in past 30 days   A1C  No lab value available in past 90 days   Troponin 3/19/2025: 23 ng/L       Impression    Out-of-hospital PEA cardiac arrest s/p ROSC      59 year old male with a PMH of acute on chronic hypoxemic and hypercapnic repsiratory failure, BiPAP dependence, COPD, CKD 3, HTN,  "HLD, RV diastolic dysfunction, tricuspid regurgitation, DM2 who presented to the ED for out-of-hospital PEA cardiac arrest s/p ROSC. On admission, CT head showed some early changes concerning for anoxic brain injury with subtle loss of gray-white differentiation. routine EEG which was done for seizure-like activity revealed [employer]'s, right temporal sharp transients intermixed on diffusely slow background.  On exam off sedation he is comatose, he does have preserved brainstem reflexes but with poor motor response. Even though exam and preliminary neurodiagnostics appear less favorable for long-term neuro recovery, he is not brain-dead yet.  Will await MRI brain to confirm evidence of anoxic brain injury, would recommend serial neuroexams and correcting any confounders in the meantime.        Recommendations    -Continuous EEG monitoring   -MRI brain on 3/21   -Minimize sedation as able   -Euthermic, Euglycemia, normonatremia    Patient Follow-up    - final recommendation pending work-up    Thank you for this consult. We will continue to follow.     The Stroke Fellow is Dr. Simon. The Stroke Staff is Dr. Whyte.    Chintan Szymanski  Medical Student  To page a member of the stroke/neurocritical care service, click here:  [facility]   Choose \"On Call\" tab at top, then search dropdown box for \"Neurology Adult\", select location, press Enter, then look for stroke/neuro ICU/telestroke.  _____________________________________________________    Clinically Significant Risk Factors          # Hypochloremia: Lowest Cl = 92 mmol/L in last 2 days, will monitor as appropriate  # Hypocalcemia: Lowest iCa = 4.2 mg/dL in last 2 days, will monitor and replace as appropriate     # Hypoalbuminemia: Lowest albumin = 2.9 g/dL at 3/20/2025  4:41 AM, will monitor as appropriate         # Acute Hypoxic Respiratory Failure: Documented O2 saturation < 90%. Continue supplemental oxygen as needed  # Acute Hypercapnic Respiratory Failure: based on venous " "blood gas results.  Continue supplemental oxygen and ventilatory support as indicated.         # Obesity: Estimated body mass index is 39.64 kg/m  as calculated from the following:    Height as of this encounter: 1.575 m (5' 2\").    Weight as of this encounter: 98.3 kg (216 lb 11.4 oz)., PRESENT ON ADMISSION              Past Medical History    No past medical history on file.  Medications   Home Meds  Prior to Admission medications    Medication Sig Start Date End Date Taking? Authorizing Provider   albuterol (VENTOLIN HFA) 108 (90 Base) MCG/ACT Inhaler Inhale 1-2 puffs into the lungs every 4 hours as needed. 12/23/15   Reported, Patient   atorvastatin (LIPITOR) 20 MG tablet Take 20 mg by mouth daily. 8/20/15   Reported, Patient   fluticasone (FLONASE) 50 MCG/ACT nasal spray Spray 1 spray into both nostrils daily.    Unknown, Entered By History   Fluticasone-Umeclidin-Vilant (TRELEGY ELLIPTA) 100-62.5-25 MCG/ACT oral inhaler Inhale 1 puff into the lungs daily.    Unknown, Entered By History   furosemide (LASIX) 40 MG tablet Take 40 mg by mouth 2 times daily.    Unknown, Entered By History   ipratropium - albuterol 0.5 mg/2.5 mg/3 mL (DUONEB) 0.5-2.5 (3) MG/3ML neb solution Take 1 vial by nebulization every 6 hours as needed for shortness of breath, wheezing or cough.    Unknown, Entered By History   latanoprost (XALATAN) 0.005 % ophthalmic solution Place 1 drop into both eyes at bedtime.    Unknown, Entered By History   pantoprazole (PROTONIX) 40 MG EC tablet Take 40 mg by mouth daily.    Unknown, Entered By History       Scheduled Meds  Current Facility-Administered Medications   Medication Dose Route Frequency Provider Last Rate Last Admin    chlorhexidine (PERIDEX) 0.12 % solution 15 mL  15 mL Mouth/Throat Q12H Annette Taylor PA-C   15 mL at 03/20/25 5028    enoxaparin ANTICOAGULANT (LOVENOX) injection 40 mg  40 mg Subcutaneous Q24H Kendra Matt MD   40 mg at 03/19/25 1219    insulin aspart " (NovoLOG) injection (RAPID ACTING)  1-7 Units Subcutaneous Q4H Kam Hutchins PA-C   1 Units at 03/20/25 0105    ipratropium - albuterol 0.5 mg/2.5 mg/3 mL (DUONEB) neb solution 3 mL  3 mL Nebulization 4x daily Kendra Matt MD   3 mL at 03/20/25 1059    methylPREDNISolone sodium succinate (SOLU-MEDROL) injection 40 mg  40 mg Intravenous Q24H Kendra Matt MD   40 mg at 03/19/25 1219    pantoprazole (PROTONIX) 2 mg/mL suspension 40 mg  40 mg Per Feeding Tube QAM  Annette Taylor PA-C   40 mg at 03/20/25 0758    Or    pantoprazole (PROTONIX) IV push injection 40 mg  40 mg Intravenous QAM  Annette Taylor PA-C        piperacillin-tazobactam (ZOSYN) 4.5 g vial to attach to  mL bag  4.5 g Intravenous Q6H Kendra Matt MD   4.5 g at 03/20/25 1023    potassium & sodium phosphates (NEUTRA-PHOS) Packet 1 packet  1 packet Oral or Feeding Tube Q4H Adriel Meléndez MD   1 packet at 03/20/25 1023    sodium chloride (PF) 0.9% PF flush 10-40 mL  10-40 mL Intracatheter Q7 Days Adriel Meléndez MD   10 mL at 03/19/25 1103    sodium chloride (PF) 0.9% PF flush 10-40 mL  10-40 mL Intracatheter Daily Adriel Meléndez MD   10 mL at 03/19/25 1103       Infusion Meds  Current Facility-Administered Medications   Medication Dose Route Frequency Provider Last Rate Last Admin    lactated ringers infusion   Intravenous Continuous Kam Hutchins PA-C 50 mL/hr at 03/19/25 2257 New Bag at 03/19/25 2257    propofol (DIPRIVAN) infusion  5-75 mcg/kg/min Intravenous Continuous Annette Taylor PA-C   Stopped at 03/20/25 1107    And    Medication Instruction   Does not apply Continuous PRN Annette Taylor PA-C        sodium chloride 0.9 % infusion   Intravenous Continuous Adriel Meléndez MD   Stopped at 03/19/25 1212       Allergies   No Known Allergies       PHYSICAL EXAMINATION   Temp:  [97  F (36.1  C)-100.2  F (37.9  C)] 98.4  F  (36.9  C)  Pulse:  [] 98  Resp:  [15-26] 26  BP: ()/(51-86) 117/70  FiO2 (%):  [60 %] 60 %  SpO2:  [91 %-98 %] 97 %    Neurologic  Mental Status:   Patient comatose, not alert to verbal or painful stimuli  Cranial Nerves:   tested pupillary response which were equal round and reactive. Corneal reflex test was positive  , patient showed a positive gag reflex, oculo-cephalic reflex negative  Motor:   Patient exhibits triple flexion in lower extremities to painful stimuli, patient exhibits mild adduction of arms to painful stimuli       Imaging  I personally reviewed all imaging; relevant findings per HPI.    Labs Data   CBC  Recent Labs   Lab 03/20/25 0441 03/19/25 0520 03/18/25 2142   WBC 12.2* 12.9* 10.9   RBC 3.03* 3.20* 3.68*   HGB 8.6* 9.0* 10.4*   HCT 30.0* 31.2* 36.9*    225 256     Basic Metabolic Panel   Recent Labs   Lab 03/20/25  0800 03/20/25 0443 03/20/25 0441 03/19/25  1123 03/19/25 0520 03/19/25  0423 03/18/25 2142   NA  --   --  143  --  143  --  142   POTASSIUM  --   --  3.7  --  4.3  --  4.3   CHLORIDE  --   --  95*  --  94*  --  92*   CO2  --   --  45*  --  44*  --  40*   BUN  --   --  30.5*  --  25.6*  --  19.6   CR  --   --  0.76  --  0.69  --  0.67   * 120* 129*   < > 105*   < > 137*   LOLA  --   --  8.2*  --  8.2*  --  8.9    < > = values in this interval not displayed.     Liver Panel  Recent Labs   Lab 03/20/25 0441 03/19/25 0520 03/18/25 2142   PROTTOTAL 6.1* 6.1* 6.9   ALBUMIN 2.9* 2.9* 3.4*   BILITOTAL 0.2 0.2 0.2   ALKPHOS 91 107 134   AST 60*  --  71*   ALT 30 40 48     INR  No lab results found.        Stroke Consult Data Data   This was a non-emergent, non-telestroke consult.

## 2025-03-20 NOTE — PROGRESS NOTES
[facility] ICU RESPIRATORY NOTE           Date of Admission: 3/18/2025     Date of Intubation (most recent): 3/18/25     Reason for Mechanical Ventilation: Cardiac arrest     Number of Days on Mechanical Ventilation: 3     Met Criteria for Spontaneous Breathing Trial: No     Reason for No Spontaneous Breathing Trial: PEEP 10     Significant Events Today: None overnight     ABG Results:   Recent Labs   Lab 03/20/25  0441 03/19/25  1115 03/19/25  0520 03/19/25  0246 03/19/25  0028 03/18/25  2202 03/18/25  2142 03/18/25  1821   PH  --   --   --   --   --  7.39  --  7.30*   PCO2  --   --   --   --   --  80*  --  87*   PO2  --   --   --   --   --  70*  --  57*   HCO3  --   --   --   --   --  48*  --  42*   O2PER 60 60 80 90   < > 70   < >  --     < > = values in this interval not displayed.     FiO2 (%): 60 %, Resp: 15, Vent Mode: CMV/AC, Resp Rate (Set): 18 breaths/min, Tidal Volume (Set, mL): 360 mL, PEEP (cm H2O): 10 cmH2O, Resp Rate (Set): 18 breaths/min, Tidal Volume (Set, mL): 360 mL, PEEP (cm H2O): 10 cmH2O    ABIGAIL Infante, RRT

## 2025-03-20 NOTE — PROGRESS NOTES
Comprehensive Daily ICU Note        Miguel Rivera MRN# [id number]   Age: 59 year old YOB: 1965     Date of Admission: 3/18/2025    Primary care provider: No Ref-Primary, Physician     CODE STATUS: Full      Problem List:   Active Problems:    Anoxic brain damage (H)    Cardiopulmonary arrest (H)    Closed fracture of multiple ribs, unspecified laterality, initial encounter            Subjective/ Last 24 hours:   Events: 59-year-old man with COPD hypertension kidney disease and chronic hypercapnick and hypoxemic respiratory failure requiring oxygen who was found unresponsive by his family with disconnected oxygen.  Unknown downtime.  EMS found patient in asystole.  Had return of spontaneous circulation approximately 30 minutes after EMS arrival.  Admitted to ICU.  EEG showed severe encephalopathy.  Purulent secretions and hypoxic with airflow obstruction not improved with steroids bronchodilator.         Physical Examination:       Temp:  [97  F (36.1  C)-100.2  F (37.9  C)] 99.3  F (37.4  C)  Pulse:  [] 102  Resp:  [15-26] 23  BP: ()/(51-86) 125/73  FiO2 (%):  [60 %] 60 %  SpO2:  [93 %-98 %] 94 %  General: Stated age intubated  HEENT: ET and OG with purulent ET tube secretions  Lungs: Synchronous with ventilator but high peak pressures anything complete exhalation.  Improved somewhat with decrease in respiratory rate  CVS: Regular rate rhythm with systolic murmur  Abdomen: Obese  Extremities/musculoskeletal/skin: Grossly normal  Neurology/Psychiatry: Eyes open spontaneously pupils symmetric and small no extraneous movements.  Flexes response to painful stimuli but does not localize.   exam of Line sites: No lines           Medications:     Current Facility-Administered Medications   Medication Dose Route Frequency Provider Last Rate Last Admin    chlorhexidine (PERIDEX) 0.12 % solution 15 mL  15 mL Mouth/Throat Q12H Annette Taylor PA-C   15 mL at 03/20/25 0758    enoxaparin  ANTICOAGULANT (LOVENOX) injection 40 mg  40 mg Subcutaneous Q24H Kendra Matt MD   40 mg at 03/20/25 1253    insulin aspart (NovoLOG) injection (RAPID ACTING)  1-7 Units Subcutaneous Q4H Kam Hutchins PA-C   1 Units at 03/20/25 0105    ipratropium - albuterol 0.5 mg/2.5 mg/3 mL (DUONEB) neb solution 3 mL  3 mL Nebulization 4x daily Kendra Matt MD   3 mL at 03/20/25 1516    methylPREDNISolone sodium succinate (SOLU-MEDROL) injection 40 mg  40 mg Intravenous Q24H Kendra Matt MD   40 mg at 03/20/25 1253    pantoprazole (PROTONIX) 2 mg/mL suspension 40 mg  40 mg Per Feeding Tube QAM  Annette Taylor PA-C   40 mg at 03/20/25 0758    Or    pantoprazole (PROTONIX) IV push injection 40 mg  [facility]        piperacillin-tazobactam (ZOSYN) 4.5 g vial to attach to  mL bag  4.5 g Intravenous Q6H Kendra Matt MD   4.5 g at 03/20/25 1023    sodium chloride (PF) 0.9% PF flush 10-40 mL  10-40 mL Intracatheter Q7 Days Adriel Meléndez MD   10 mL at 03/19/25 1103    sodium chloride (PF) 0.9% PF flush 10-40 mL  10-40 mL Intracatheter Daily Adriel Meléndez MD   10 mL at 03/19/25 1103        Current Facility-Administered Medications   Medication Dose Route Frequency Provider Last Rate Last Admin    lactated ringers infusion   Intravenous Continuous Kam Hutchins PA-C 50 mL/hr at 03/19/25 2257 New Bag at 03/19/25 2257    propofol (DIPRIVAN) infusion  5-75 mcg/kg/min Intravenous Continuous Annette Taylor PA-C   Stopped at 03/20/25 1107    And    Medication Instruction   Does not apply Continuous PRN Annette Taylor PA-C        sodium chloride 0.9 % infusion   Intravenous Continuous Adriel Meléndez MD   Stopped at 03/19/25 1212       Current Facility-Administered Medications   Medication Dose Route Frequency Provider Last Rate Last Admin    acetaminophen (TYLENOL) tablet 650 mg  650  mg Oral Q6H PRN Annette Taylor PA-C        Or    acetaminophen (TYLENOL) oral liquid 650 mg  650 mg Per NG tube Q6H PRN Annette Taylor PA-C        albuterol (PROVENTIL) neb solution 2.5 mg  2.5 mg Nebulization Q4H PRN Annette Taylor PA-C        glucose gel 15-30 g  15-30 g Oral Q15 Min PRN Kam Hutchins PA-C        Or    dextrose 50 % injection 25-50 mL  25-50 mL Intravenous Q15 Min PRN Kam Hutchins PA-C        Or    glucagon injection 1 mg  1 mg Subcutaneous Q15 Min PRN Kam Hutchins PA-C        fentaNYL (SUBLIMAZE) 50 mcg/mL bolus from pump  25 mcg Intravenous Q1H PRN Annette Taylor PA-C        HYDROmorphone (DILAUDID) injection 0.2 mg  0.2 mg Intravenous Q2H PRN Annette Taylor PA-C        lidocaine (LMX4) cream   Topical Q1H PRN Adriel Meléndez MD        lidocaine 1 % 0.1-5 mL  0.1-5 mL Other Q1H PRN Adriel Meléndez MD   2 mL at 03/19/25 0935    propofol (DIPRIVAN) bolus from bag or syringe pump  10 mg Intravenous Q15 Min PRN Annette Taylor PA-C        And    Medication Instruction   Does not apply Continuous PRN Annette Taylor PA-C        [Held by provider] midazolam (VERSED) injection 4 mg  4 mg Intravenous Q1H PRN Mir Jacobson MD   4 mg at 03/18/25 1924    naloxone (NARCAN) injection 0.2 mg  0.2 mg Intravenous Q2 Min PRN John Ahuja RPH        Or    naloxone (NARCAN) injection 0.4 mg  0.4 mg Intravenous Q2 Min PRN John Ahuja RPH        Or    naloxone (NARCAN) injection 0.2 mg  0.2 mg Intramuscular Q2 Min PRN John Ahuja RPH        Or    naloxone (NARCAN) injection 0.4 mg  0.4 mg Intramuscular Q2 Min PRN John Ahuja RPH        ondansetron (ZOFRAN ODT) ODT tab 4 mg  4 mg Oral Q6H PRN Annette Taylor PA-C        Or    ondansetron (ZOFRAN) injection 4 mg  4 mg Intravenous Q6H PRN Annette Taylor PA-C        polyethylene glycol (MIRALAX) Packet 17 g  17 g Oral Daily PRN Annette Taylor,  DAGO        senna-docusate (SENOKOT-S/PERICOLACE) 8.6-50 MG per tablet 1 tablet  1 tablet Oral BID PRN Annette Taylor PA-C        Or    senna-docusate (SENOKOT-S/PERICOLACE) 8.6-50 MG per tablet 2 tablet  2 tablet Oral BID PRN Annette Taylor PA-C        sodium chloride (PF) 0.9% PF flush 10-20 mL  10-20 mL Intracatheter q1 min prn Adriel Meléndez MD             Imaging and Other Data:        EEG Video 2-12 HRS Ummonitored  EEG Video 2-12 HRS Ummonitored Result    VIDEO EEG DATE: 3/19/2025  VIDEO EEG LOG: [facility]  5HR. VIDEO  VIDEO EEG DAY#: 0  VIDEO EEG SOURCE FILE DURATION: 5Hours and 30Minutes    PATIENT INFORMATION: Miguel Rivera is a 59 year old male with PMH of   acute on chronic hypoxemic and hypercapnic respiratory failure with Bipap   dependence, COPD, HTN, HLD, Stage III CKD, RV diastolic dysfunction,   Tricuspid Regurgitation, and Type II DM, resented to [facility] ED via EMS   after unwitnessed cardiac arrest. EEG is being done to evaluate for   seizures.     MEDICATIONS: Reviewed-See electronic health record for list of medications   administered on the day of this recording  These medications and doses were derived from the medical record at the   time of this procedure.    TECHNICAL SUMMARY: EEG was recorded from 24 WIRES scalp electrodes placed   according to the 10-20 international system. Additional electrodes were   used for referencing, EKG, and to record from other cerebral regions as   appropriate. Video was continuously recorded and was reviewed for clinical   correlation. Electrodes were attached and both video and EEG were   monitored and annotated by qualified EEG technologists. Video-EEG was   reviewed and report generated by qualified physician.    FINDINGS:  no well-organized posterior dominant rhythm sleep-wake cycling   was observed.  Background consisted of diffuse 2 to 5 Hz theta delta   activities.  There were high amplitude sharply contoured delta  activities   in the frontal regions.  There were also found in a predominant   generalized sharp waves which at times came periodically at 1 Hz.   There   were also sharp transients over the right frontotemporal head region.    ICTAL: No clinical events or electrographic seizures were recorded. Video   was reviewed intermittently by EEG technologist and physician for clinical   seizures.     IMPRESSION OF VIDEO EEG DAY # 0: This video electroencephalogram is   abnormal due to the presence of diffuse delta predominant slowing with   intermittent generalized sharp waves which at times came as periodic   discharges (GPDs) and right temporal sharp transients, which appeared to   be epileptogenic.  Findings are consistent with severe diffuse nonspecific   encephalopathy and cortical irritability.  No electrographic seizures were   recorded.  Clinical correlation is advised.     Jacy Monsalve MD  EPILEPSY STAFF   Echocardiogram Complete  [id number]  [facility]  [id number]  [id number]^[email or web address]^SOURAV     [facility]  Echocardiography Laboratory  [address] [place] [address]     Name: ANGEL PENA  MRN: [id number]  : 1965  Study Date: 2025 11:20 PM  Age: 59 yrs  Gender: Male  Patient Location: [facility]  Reason For Study: Cardiac Arrest  Ordering Physician: SOURAV POE  Performed By: Ramin Walker     BSA: 2.0 m2  Height: 62 in  Weight: 230 lb  HR: 77  BP: 134/87 mmHg  ______________________________________________________________________________  Procedure  Echocardiogram with two-dimensional, color and spectral Doppler. Definity (NDC  #73085-384) given intravenously.  ______________________________________________________________________________  Interpretation Summary     The left ventricle is normal in size.  Left ventricular systolic function is normal.  The visual ejection fraction is 55-60%.  Normal left ventricular wall motion  The right ventricle is normal  in structure, function and size.  Mild valvular aortic stenosis. The mean AoV pressure gradient is 13mmHg.  The rhythm was atrial fibrillation.  There is no comparison study available.  ______________________________________________________________________________  Left Ventricle  The left ventricle is normal in size. There is normal left ventricular wall  thickness. Left ventricular systolic function is normal. The visual ejection  fraction is 55-60%. Diastolic Doppler findings (E/E' ratio and/or other  parameters) suggest left ventricular filling pressures are indeterminate.  Normal left ventricular wall motion.     Right Ventricle  The right ventricle is normal in structure, function and size.     Atria  Normal left atrial size. Right atrial size is normal. There is no atrial shunt  seen.     Mitral Valve  The mitral valve is normal in structure and function. There is trace mitral  regurgitation.     Tricuspid Valve  The tricuspid valve is normal in structure and function. There is trace  tricuspid regurgitation. Right ventricular systolic pressure could not be  approximated due to inadequate tricuspid regurgitation.     Aortic Valve  There is trace aortic regurgitation. Mild valvular aortic stenosis. The mean  AoV pressure gradient is 13mmHg.     Pulmonic Valve  The pulmonic valve is not well seen, but is grossly normal. There is trace  pulmonic valvular regurgitation.     Vessels  Normal size aorta.     Pericardium  Trivial posterior pericardial effusion.     Rhythm  The rhythm was atrial fibrillation.  ______________________________________________________________________________  MMode/2D Measurements & Calculations     IVSd: 1.1 cm  LVIDd: 5.1 cm  LVIDs: 2.9 cm  LVPWd: 1.2 cm  FS: 43.1 %  LV mass(C)d: 227.4 grams  LV mass(C)dI: 112.1 grams/m2  Ao root diam: 3.0 cm  asc Aorta Diam: 3.4 cm  LVOT diam: 2.2 cm  LVOT area: 3.8 cm2  Ao root diam index Ht(cm/m): 1.9  Ao root diam index BSA (cm/m2): 1.5  Asc Ao diam  index BSA (cm/m2): 1.7  Asc Ao diam index Ht(cm/m): 2.2  LA Volume (BP): 53.4 ml     LA Volume Index (BP): 26.3 ml/m2  RV Base: 4.5 cm  RWT: 0.47  TAPSE: 2.0 cm     Doppler Measurements & Calculations  MV E max wally: 92.2 cm/sec  MV dec slope: 289.0 cm/sec2  MV dec time: 0.32 sec  Ao V2 max: 220.0 cm/sec  Ao max P.0 mmHg  Ao V2 mean: 171.0 cm/sec  Ao mean P.0 mmHg  Ao V2 VTI: 39.5 cm  ROBERT(I,D): 2.0 cm2  ROBERT(V,D): 2.1 cm2  LV V1 max P.9 mmHg  LV V1 max: 121.0 cm/sec  LV V1 VTI: 20.3 cm  SV(LVOT): 77.2 ml  SI(LVOT): 38.0 ml/m2  PA acc time: 0.09 sec  AV Wally Ratio (DI): 0.55  ROBERT Index (cm2/m2): 0.96     E/E' av.1  Lateral E/e': 9.0  Medial E/e': 9.2  RV S Wally: 12.9 cm/sec     ______________________________________________________________________________  Report approved by: Olivier Garcia MD on 2025 01:27 AM                   Assessment and Plan:     Summary:  Miguel Rivera is a 59 year old male admitted on 3/18/2025 for cardiac arrest.  I have personally reviewed the daily labs, imaging studies, cultures and discussed the case with referring physician and consulting physicians. My assessment and plan as follows:    Neurology and Psychiatry:  Assessment:   Current Facility-Administered Medications   Medication Dose Route Frequency Provider Last Rate Last Admin    lactated ringers infusion   Intravenous Continuous Her-Kam George PA-C 50 mL/hr at 25 2257 New Bag at 25 2257    propofol (DIPRIVAN) infusion  5-75 mcg/kg/min Intravenous Continuous Annette Taylor PA-C   Stopped at 25 1107    And    Medication Instruction   Does not apply Continuous PRN Annette Taylor PA-C        sodium chloride 0.9 % infusion   Intravenous Continuous Adriel Meléndez MD   Stopped at 25 1212       Metabolic and likely anoxic encephalopathy.  Unknown downtime and asystole on presentation.  Head CT with suggestion of anoxic injury.  EEG showed severe encephalopathy.         Plan   Consult neurocritical care for exam and recommendations regarding prognosis.  Tentative plan for MRI tomorrow, 3/21    Cardiovascular and Hemodynamics:  Assessment:  Temp:  [97  F (36.1  C)-100.2  F (37.9  C)] 99.3  F (37.4  C)  Pulse:  [] 102  Resp:  [15-26] 23  BP: ()/(51-86) 125/73  FiO2 (%):  [60 %] 60 %  SpO2:  [93 %-98 %] 94 %   Intake/Output Summary (Last 24 hours) at 3/20/2025 1543  Last data filed at 3/20/2025 1400  Gross per 24 hour   Intake 1809.03 ml   Output 1220 ml   Net 589.03 ml     Net IO Since Admission: -341.99 mL [25 1543]    Echo result w/o MOPS: Interpretation Summary The left ventricle is normal in size.Left ventricular systolic function is normal.The visual ejection fraction is 55-60%.Normal left ventricular wall motionThe right ventricle is normal in structure, function and size.Mild valvular aortic stenosis. The mean AoV pressure gradient is 13mmHg.The rhythm was atrial fibrillation.There is no comparison study available.      Vasoplegic/cardiogenic shock.  This is improved.  Asystolic arrest.  From history respiratory as a primary source seems most likely.  EKG not consistent with STEMI.  Troponin minimally elevated  Medical problems include hypertension dyslipidemia and atrial fibrillation and diastolic heart failure and tricuspid regurgitation.  Blood pressures now trending higher    ectopy that has resolved.     Plan  Monitor hemodynamics closely.  Initiate antihypertensive if necessary.  There is no indication for evaluation for coronary artery disease  Discuss CODE STATUS with family pending neurology assessment.    Pulmonary/ID:  Assessment  Temp (24hrs), Av.1  F (37.3  C), Min:97  F (36.1  C), Max:100.2  F (37.9  C)      FiO2 (%): 60 %, Resp: 23, Vent Mode: CMV/AC, Resp Rate (Set): 10 breaths/min, Tidal Volume (Set, mL): 450 mL, PEEP (cm H2O): 10 cmH2O, Resp Rate (Set): 10 breaths/min, Tidal Volume (Set, mL): 450 mL, PEEP (cm H2O): 10 cmH2O  Arterial Blood Gas  Recent Labs   Lab 03/20/25  1302 03/20/25  0441 03/19/25  1115 03/19/25  0520 03/19/25  0028 03/18/25  2202 03/18/25  2142 03/18/25  1821   PH  --   --   --   --   --  7.39  --  7.30*   PCO2  --   --   --   --   --  80*  --  87*   PO2  --   --   --   --   --  70*  --  57*   HCO3  --   --   --   --   --  48*  --  42*   O2PER 60 60 60 80   < > 70   < >  --     < > = values in this interval not displayed.     pH Venous   Date Value Ref Range Status   03/20/2025 7.35 7.32 - 7.43 Final   03/20/2025 7.39 7.32 - 7.43 Final   03/19/2025 7.36 7.32 - 7.43 Final     pCO2 Venous   Date Value Ref Range Status   03/20/2025 85 (HH) 40 - 50 mm Hg Final   03/20/2025 83 (HH) 40 - 50 mm Hg Final   03/19/2025 87 (HH) 40 - 50 mm Hg Final       Acute on chronic hypoxic and hypercapnic respiratory failure.  pH 7.3 pCO2 90 suggesting profound chronic hypercapnia.  On ventilator today, 3/20, evidence of severe airflow obstruction.  Improved somewhat after decreasing respiratory rate and increasing tidal volume. Pressure support was not helpful.  Likely aspiration pneumonia: Low-grade fever, leukocytosis, purulent secretions, gram-positive cocci in sputum Gram stain.  Culture pending    plan:  Continue antibiotics   continue bronchodilators and high steroids  Have changed ventilator settings. Patient on 9 ml/kg.   Vap precautions: HOB 30, chlorhexidine    GI and Nutrition:  Assessment:  AST   Date Value Ref Range Status   03/20/2025 60 (H) 0 - 45 U/L Final   03/19/2025   Final     Comment:     Unsatisfactory specimen - hemolyzed     03/18/2025 71 (H) 0 - 45 U/L Final     ALT   Date Value Ref Range Status   03/20/2025 30 0 - 70 U/L Final   03/19/2025 40 0 - 70 U/L Final   03/18/2025 48 0 - 70 U/L Final     Bilirubin Total   Date Value Ref Range Status   03/20/2025 0.2 <=1.2 mg/dL Final   03/19/2025 0.2 <=1.2 mg/dL Final   03/18/2025 0.2 <=1.2 mg/dL Final     Protein Total   Date Value Ref Range Status   03/20/2025 6.1 (L)  "6.4 - 8.3 g/dL Final   2025 6.1 (L) 6.4 - 8.3 g/dL Final   2025 6.9 6.4 - 8.3 g/dL Final     Albumin   Date Value Ref Range Status   2025 2.9 (L) 3.5 - 5.2 g/dL Final   2025 2.9 (L) 3.5 - 5.2 g/dL Final   2025 3.4 (L) 3.5 - 5.2 g/dL Final     Alkaline Phosphatase   Date Value Ref Range Status   2025 91 40 - 150 U/L Final   2025 107 40 - 150 U/L Final   2025 134 40 - 150 U/L Final     Transaminitis likely secondary to arrest given elevated lactic acid.  Improving.   Nutritional Plan: Orders Placed This Encounter      NPO for Medical/Clinical Reasons Except for: No Exceptions      Plan:   Monitor   Stress ulcer: PPI  Tube feeds tomorrow after discussing overall plan of care with family  Renal, Fluid and Electrolytes:  Assessment:  BMP  Recent Labs   Lab 25  0441 25  0520 25  2142 25  1728    143 142 144   POTASSIUM 3.7 4.3 4.3 4.6   CHLORIDE 95* 94* 92* 93*   CO2 45* 44* 40* 36*   ANIONGAP 3* 5* 10 15   BUN 30.5* 25.6* 19.6 15.8   CR 0.76 0.69 0.67 0.68   GFRESTIMATED >90 >90 >90 >90   LOLA 8.2* 8.2* 8.9 9.5   MAG 2.1 2.2 1.8 2.3   PHOS 2.4* 3.1 4.0  --      Lactic Acid:  Lactic Acid   Date Value Ref Range Status   2025 0.8 0.7 - 2.0 mmol/L Final   2025 0.7 0.7 - 2.0 mmol/L Final   2025 2.4 (H) 0.7 - 2.0 mmol/L Final     No results found for: \"KETONE\"        Intake/Output Summary (Last 24 hours) at 3/19/2025 1119  Last data filed at 3/19/2025 1100  Gross per 24 hour   Intake 954.38 ml   Output 1925 ml   Net -970.62 ml    Net IO Since Admission: -970.62 mL [25 1119]    Respiratory acidosis with metabolic compensation.  Urine output adequate  Hypophosphatemia    Plan:   Maintenance IV fluid to continue  Electrolyte replacement as needed  monitor I/O, urine output, kidney function and electrolytes and treat as needed. Avoid nephrotoxic agents.    Infectious Disease:  Assessment:  Temp (24hrs), Av.1  F (37.3  C), " "Min:97  F (36.1  C), Max:100.2  F (37.9  C)      7-Day Micro Results       Collected Updated Procedure Result Status      03/19/2025 0426 03/20/2025 0953 Respiratory Aerobic Bacterial Culture with Gram Stain [[id number]]   (Abnormal)   Aspirate from Endotracheal    Preliminary result Component Value   Culture Culture in progress  [P]    Gram Stain Result >25 PMNs/low power field  [P]     4+ Gram positive bacilli resembling diphtheroids  [P]     1+ Gram positive cocci  [P]                03/18/2025 2207 03/20/2025 1359 Respiratory Aerobic Bacterial Culture with Gram Stain [[id number]]   (Abnormal)   Sputum from Endotracheal    Preliminary result Component Value   Culture Culture in progress  [P]    Gram Stain Result >25 PMNs/low power field  [P]     3+ Gram positive bacilli resembling diphtheroids  [P]     1+ Gram positive cocci  [P]                03/18/2025 1728 03/19/2025 2316 Blood Culture Peripheral Blood [[id number]]   Peripheral Blood    Preliminary result Component Value   Culture No growth after 1 day  [P]                03/18/2025 1728 03/19/2025 2006 Blood Culture Peripheral Blood [[id number]]   Peripheral Blood    Preliminary result Component Value   Culture No growth after 1 day  [P]                      See respiratory section        Hematology and Oncology:  Assessment:  Recent Labs   Lab 03/20/25  0441 03/19/25  0520 03/18/25  2142 03/18/25  1728   WBC 12.2* 12.9* 10.9 10.1   RBC 3.03* 3.20* 3.68* 3.46*   HGB 8.6* 9.0* 10.4* 9.9*   HCT 30.0* 31.2* 36.9* 36.7*   MCV 99 98 100 106*   MCH 28.4 28.1 28.3 28.6   MCHC 28.7* 28.8* 28.2* 27.0*   RDW 14.6 14.4 14.4 14.3    225 256 238     No lab results found in last 7 days.  No results found for: \"AAUFH\"      1.  Anemia.  No signs of bleeding  2. Leukocytosis secondary to stress and likely aspiration pneumonia.     Plan:  Treat with antibiotics   anticoagulation:  Enoxaparin     Endocrinology and Rheumatology:  Assessment:  Stress " "hyperglycemia  Recent Labs   Lab 25  1301 25  0800 25  0443 25  0441 25  0103 25  2230   * 106* 120* 129* 170* 193*      Plan:  Insulin if necessary.  Will give some glucose and fluids since is n.p.o.  Steroids    Skin and Musculoskeletal:  Assessment:  Standard skin cares        Billin minutes spent for critical care time exclusive of procedures        Family update: Bedside yesterday.  Pending today          Clinically Significant Risk Factors     # Obesity: Estimated body mass index is 39.64 kg/m  as calculated from the following:    Height as of this encounter: 1.575 m (5' 2\").    Weight as of this encounter: 98.3 kg (216 lb 11.4 oz).         [facility] ICU Rounding checklist    Assessment of central venous catheter access Central access present and needed   Assessment of urinary catheter use [place]ount present and needed.  Indication:  obstruction and hemodynamic instability   DVT prophylaxis Subcutaneous heparin product (heparin or LMWH)         This document was generated with the assistance of voice recognition software. Unintentional transcription errors may occur. Please contact the author for any clarification.   Kendra Matt MD        "

## 2025-03-20 NOTE — PLAN OF CARE
Problem: Skin Injury Risk Increased  Goal: Skin Health and Integrity  Outcome: Progressing  Intervention: Plan: Nurse Driven Intervention: Positioning  Recent Flowsheet Documentation  Taken 3/20/2025 0400 by Jazmyne Briceno RN  Plan: Positioning Interventions:   REPOSITION Left/Right (No supine) q2h   OFF-LOAD HEELS with pillows   Use wedge positioners  Taken 3/20/2025 0000 by Jazmyne Briceno RN  Plan: Positioning Interventions:   REPOSITION Left/Right (No supine) q2h   OFF-LOAD HEELS with pillows   Use wedge positioners  Taken 3/19/2025 2000 by Jazmyne Briceno RN  Plan: Positioning Interventions:   REPOSITION Left/Right (No supine) q2h   OFF-LOAD HEELS with pillows   Use wedge positioners  Intervention: Plan: Nurse Driven Intervention: Moisture Management  Recent Flowsheet Documentation  Taken 3/20/2025 0400 by Jazmyne Briceno RN  Moisture Interventions:   Incontinence pad   Urinary collection device  Taken 3/20/2025 0000 by Jazmyne Briceno RN  Moisture Interventions:   Incontinence pad   Urinary collection device  Taken 3/19/2025 2000 by Jazmyne Briceno RN  Moisture Interventions:   Incontinence pad   Urinary collection device  Intervention: Plan: Nurse Driven Intervention: Friction and Shear  Recent Flowsheet Documentation  Taken 3/20/2025 0400 by Jazmyne Briceno RN  Friction/Shear Interventions:   HOB 30 degrees or less   Silicone foam sacral dressing   Assistive lifting device (portable/ceiling lift, etc.)   Lateral transfer device (hovermat, etc.)   Repositioning device (TAP system, etc.)  Taken 3/20/2025 0000 by Jazmyne Briceno RN  Friction/Shear Interventions:   HOB 30 degrees or less   Silicone foam sacral dressing   Assistive lifting device (portable/ceiling lift, etc.)   Lateral transfer device (hovermat, etc.)   Repositioning device (TAP system, etc.)  Taken 3/19/2025 2000 by Jazmyne Briceno RN  Friction/Shear Interventions:   HOB 30 degrees or less   Silicone foam sacral dressing    Assistive lifting device (portable/ceiling lift, etc.)   Lateral transfer device (hovermat, etc.)   Repositioning device (TAP system, etc.)  Intervention: Optimize Skin Protection  Recent Flowsheet Documentation  Taken 3/20/2025 0600 by Jazmyne Briceno RN  Head of Bed (HOB) Positioning: HOB at 30 degrees  Taken 3/20/2025 0400 by Jazmyne Briceno RN  Activity Management: bedrest  Head of Bed (HOB) Positioning: HOB at 30 degrees  Taken 3/20/2025 0200 by Jazmyne Briceno RN  Head of Bed (HOB) Positioning: HOB at 30 degrees  Taken 3/20/2025 0000 by Jazmyne Briceno RN  Activity Management: bedrest  Head of Bed (HOB) Positioning: HOB at 30 degrees  Taken 3/19/2025 2200 by Jazmyne Briceno RN  Head of Bed (HOB) Positioning: HOB at 30 degrees  Taken 3/19/2025 2000 by Jazmyne Briceno RN  Activity Management: bedrest  Head of Bed (HOB) Positioning: HOB at 30 degrees   Goal Outcome Evaluation:       Summary: Cardiac arrest    [id number]  Neuro: Opens eyes, not to command, withdrawing BLE, possible flexion. No gag reflex.     CV/Tele:Sinus tach to NSR. MAP goal > 65. E 50-55%    Resp: LS dem. ETT 23 cm at teeth, R 18, , FiO2 60, PEEP 10    GI/: Blount in place, good UOP. OG 65 cm, clamped.    Skin:Mepilex to coccyx. Kevin BLE.     IV/infusion:LR at 50, prop. R PICC.     Followed By:ICU, neuro consult

## 2025-03-20 NOTE — PLAN OF CARE
"  Problem: Adult Inpatient Plan of Care  Goal: Patient-Specific Goal (Individualized)  Description: You can add care plan individualizations to a care plan. Examples of Individualization might be:  \"Parent requests to be called daily at 9am for status\", \"I have a hard time hearing out of my right ear\", or \"Do not touch me to wake me up as it startles  me\".  Outcome: Not Progressing   Goal Outcome Evaluation:      Plan of Care Reviewed With: patient, sibling    Overall Patient Progress: no changeOverall Patient Progress: no change    Outcome Evaluation: Pt remains intubated. Propofol turned off. EEG turned on. Neuro assessment largely unchanged- see Flowsheet for detailed charting. HR low 100's. Diminished lung sounds. NPO. Adequate UOP. Critical lab results communicated to provider. Brother updated via phone. Phos replaced.       "

## 2025-03-21 ENCOUNTER — HOSPITAL ENCOUNTER (INPATIENT)
Dept: NEUROLOGY | Facility: CLINIC | Age: 60
Discharge: HOME | End: 2025-03-21
Attending: PSYCHIATRY & NEUROLOGY
Payer: COMMERCIAL

## 2025-03-21 VITALS
HEART RATE: 97 BPM | SYSTOLIC BLOOD PRESSURE: 126 MMHG | BODY MASS INDEX: 39.88 KG/M2 | DIASTOLIC BLOOD PRESSURE: 66 MMHG | TEMPERATURE: 99 F | RESPIRATION RATE: 21 BRPM | OXYGEN SATURATION: 98 % | HEIGHT: 62 IN | WEIGHT: 216.71 LBS

## 2025-03-21 PROBLEM — J44.1 COPD EXACERBATION (H): Status: ACTIVE | Noted: 2025-01-01

## 2025-03-21 PROBLEM — J96.22 ACUTE ON CHRONIC RESPIRATORY FAILURE WITH HYPOXIA AND HYPERCAPNIA (H): Status: ACTIVE | Noted: 2025-03-21

## 2025-03-21 PROBLEM — J69.0 ASPIRATION PNEUMONIA (H): Status: ACTIVE | Noted: 2025-01-01

## 2025-03-21 PROBLEM — J96.21 ACUTE ON CHRONIC RESPIRATORY FAILURE WITH HYPOXIA AND HYPERCAPNIA (H): Status: ACTIVE | Noted: 2025-03-21

## 2025-03-21 LAB
BACTERIA ASPIRATE CULT: ABNORMAL
GLUCOSE BLDC GLUCOMTR-MCNC: 103 MG/DL (ref 70–99)
GLUCOSE BLDC GLUCOMTR-MCNC: 104 MG/DL (ref 70–99)
GLUCOSE BLDC GLUCOMTR-MCNC: 120 MG/DL (ref 70–99)
GLUCOSE BLDC GLUCOMTR-MCNC: 129 MG/DL (ref 70–99)
GLUCOSE BLDC GLUCOMTR-MCNC: 73 MG/DL (ref 70–99)
GLUCOSE BLDC GLUCOMTR-MCNC: 80 MG/DL (ref 70–99)
GRAM STAIN RESULT: ABNORMAL
MAGNESIUM SERPL-MCNC: 2.2 MG/DL (ref 1.7–2.3)
PHOSPHATE SERPL-MCNC: 2.9 MG/DL (ref 2.5–4.5)
POTASSIUM SERPL-SCNC: 4.6 MMOL/L (ref 3.4–5.3)

## 2025-03-21 PROCEDURE — 250N000009 HC RX 250: Performed by: INTERNAL MEDICINE

## 2025-03-21 PROCEDURE — 200N000001 HC R&B ICU

## 2025-03-21 PROCEDURE — 95711 VEEG 2-12 HR UNMONITORED: CPT

## 2025-03-21 PROCEDURE — 95718 EEG PHYS/QHP 2-12 HR W/VEEG: CPT | Performed by: PSYCHIATRY & NEUROLOGY

## 2025-03-21 PROCEDURE — 84100 ASSAY OF PHOSPHORUS: CPT | Performed by: INTERNAL MEDICINE

## 2025-03-21 PROCEDURE — 258N000003 HC RX IP 258 OP 636: Performed by: PHYSICIAN ASSISTANT

## 2025-03-21 PROCEDURE — 250N000011 HC RX IP 250 OP 636: Mod: JZ

## 2025-03-21 PROCEDURE — 94003 VENT MGMT INPAT SUBQ DAY: CPT

## 2025-03-21 PROCEDURE — 99291 CRITICAL CARE FIRST HOUR: CPT | Mod: 25 | Performed by: PSYCHIATRY & NEUROLOGY

## 2025-03-21 PROCEDURE — 94640 AIRWAY INHALATION TREATMENT: CPT

## 2025-03-21 PROCEDURE — 255N000002 HC RX 255 OP 636: Performed by: PSYCHIATRY & NEUROLOGY

## 2025-03-21 PROCEDURE — A9585 GADOBUTROL INJECTION: HCPCS | Performed by: PSYCHIATRY & NEUROLOGY

## 2025-03-21 PROCEDURE — 999N000157 HC STATISTIC RCP TIME EA 10 MIN

## 2025-03-21 PROCEDURE — 94640 AIRWAY INHALATION TREATMENT: CPT | Mod: 76

## 2025-03-21 PROCEDURE — 84132 ASSAY OF SERUM POTASSIUM: CPT | Performed by: SURGERY

## 2025-03-21 PROCEDURE — 999N000052 EEG VIDEO 2-12 HRS UNMONITORED

## 2025-03-21 PROCEDURE — 99207 PR NO BILLABLE SERVICE THIS VISIT: CPT | Performed by: PHYSICIAN ASSISTANT

## 2025-03-21 PROCEDURE — 250N000013 HC RX MED GY IP 250 OP 250 PS 637

## 2025-03-21 PROCEDURE — 99291 CRITICAL CARE FIRST HOUR: CPT | Performed by: INTERNAL MEDICINE

## 2025-03-21 PROCEDURE — 83735 ASSAY OF MAGNESIUM: CPT | Performed by: SURGERY

## 2025-03-21 PROCEDURE — 250N000011 HC RX IP 250 OP 636: Performed by: INTERNAL MEDICINE

## 2025-03-21 PROCEDURE — 999N000254 HC STATISTIC VENTILATOR TRANSFER

## 2025-03-21 RX ORDER — GADOBUTROL 604.72 MG/ML
9 INJECTION INTRAVENOUS ONCE
Status: COMPLETED | OUTPATIENT
Start: 2025-03-21 | End: 2025-03-21

## 2025-03-21 RX ADMIN — ENOXAPARIN SODIUM 40 MG: 40 INJECTION SUBCUTANEOUS at 12:14

## 2025-03-21 RX ADMIN — METHYLPREDNISOLONE SODIUM SUCCINATE 40 MG: 40 INJECTION, POWDER, FOR SOLUTION INTRAMUSCULAR; INTRAVENOUS at 12:14

## 2025-03-21 RX ADMIN — PIPERACILLIN AND TAZOBACTAM 4.5 G: 4; .5 INJECTION, POWDER, FOR SOLUTION INTRAVENOUS at 03:36

## 2025-03-21 RX ADMIN — PIPERACILLIN AND TAZOBACTAM 4.5 G: 4; .5 INJECTION, POWDER, FOR SOLUTION INTRAVENOUS at 22:31

## 2025-03-21 RX ADMIN — IPRATROPIUM BROMIDE AND ALBUTEROL SULFATE 3 ML: .5; 3 SOLUTION RESPIRATORY (INHALATION) at 11:22

## 2025-03-21 RX ADMIN — IPRATROPIUM BROMIDE AND ALBUTEROL SULFATE 3 ML: .5; 3 SOLUTION RESPIRATORY (INHALATION) at 19:43

## 2025-03-21 RX ADMIN — PIPERACILLIN AND TAZOBACTAM 4.5 G: 4; .5 INJECTION, POWDER, FOR SOLUTION INTRAVENOUS at 16:11

## 2025-03-21 RX ADMIN — CHLORHEXIDINE GLUCONATE 15 ML: 1.2 RINSE ORAL at 20:30

## 2025-03-21 RX ADMIN — IPRATROPIUM BROMIDE AND ALBUTEROL SULFATE 3 ML: .5; 3 SOLUTION RESPIRATORY (INHALATION) at 07:50

## 2025-03-21 RX ADMIN — HYDROMORPHONE HYDROCHLORIDE 0.2 MG: 0.2 INJECTION, SOLUTION INTRAMUSCULAR; INTRAVENOUS; SUBCUTANEOUS at 04:08

## 2025-03-21 RX ADMIN — Medication 40 MG: at 09:09

## 2025-03-21 RX ADMIN — GADOBUTROL 9 ML: 604.72 INJECTION INTRAVENOUS at 05:26

## 2025-03-21 RX ADMIN — PIPERACILLIN AND TAZOBACTAM 4.5 G: 4; .5 INJECTION, POWDER, FOR SOLUTION INTRAVENOUS at 09:18

## 2025-03-21 RX ADMIN — CHLORHEXIDINE GLUCONATE 15 ML: 1.2 RINSE ORAL at 09:12

## 2025-03-21 RX ADMIN — SODIUM CHLORIDE, SODIUM LACTATE, POTASSIUM CHLORIDE, AND CALCIUM CHLORIDE: .6; .31; .03; .02 INJECTION, SOLUTION INTRAVENOUS at 16:25

## 2025-03-21 RX ADMIN — IPRATROPIUM BROMIDE AND ALBUTEROL SULFATE 3 ML: .5; 3 SOLUTION RESPIRATORY (INHALATION) at 15:37

## 2025-03-21 ASSESSMENT — ACTIVITIES OF DAILY LIVING (ADL)
ADLS_ACUITY_SCORE: 79
ADLS_ACUITY_SCORE: 83
ADLS_ACUITY_SCORE: 79
ADLS_ACUITY_SCORE: 83
ADLS_ACUITY_SCORE: 79
ADLS_ACUITY_SCORE: 79
ADLS_ACUITY_SCORE: 83
ADLS_ACUITY_SCORE: 79
ADLS_ACUITY_SCORE: 83
ADLS_ACUITY_SCORE: 79
ADLS_ACUITY_SCORE: 83

## 2025-03-21 NOTE — CONSULTS
SPIRITUAL HEALTH SERVICES  SPIRITUAL ASSESSMENT Consult Note  [facility] ICU     REFERRAL SOURCE: Phone call from ICU for support    Reviewed documentation. Reflective conversation on the phone shared with patient's brother, Wilver, which integrated elements of illness and family narratives.     Patient's brother, Wilver, shared that Miguel was raised in the Mosque tradition, but does not have a guanakito community at this time. He requested prayer and scripture reading at the bedside and also asked that an olive wood holding cross be brought and left in the room.   I offered spiritual and emotional support to Wilver through reflective listening that affirmed emotions, experience, and meaning.   At the bedside, I offered assurance through prayers and scripture readings.    PLAN: Updated bedside nurse. Spiritual Health remains available for support. Please re-consult as needs arise or as requested.    Koki Mcneil  Associate      [facility] available 24/7 for emergent requests/referrals, either by paging the on-call  or by entering an ASAP/STAT consult in Epic (this will also page the on-call ).

## 2025-03-21 NOTE — PROGRESS NOTES
[facility]    Vascular Neurology Progress Note    Interval History     Miguel was seen and examined this AM, neuroexam grossly unchanged except for loss of cough on my eval. continuous EEG with suppressed pattern without any seizures, MRI brain with widespread anoxic brain injury.      Hospital Course     Chief complaint: Cardiac Arrest    59 year old male with a PMH of acute on chronic hypoxemic and hypercapnic repsiratory failure, BiPAP dependence, COPD, CKD 3, HTN, HLD, RV diastolic dysfunction, tricuspid regurgitation, DM2 who presented to the ED for out-of-hospital PEA cardiac arrest s/p ROSC. On admission, CT head showed some early changes concerning for anoxic brain injury with subtle loss of gray-white differentiation.  cEEG with suppressed activity but was negative for any seizures.  On exam off sedation he is comatose, he does have preserved brainstem reflexes but with poor motor response.    Assessment and Plan       Anoxic brain injury   Out-of-hospital PEA cardiac arrest s/p ROSC     59 year old male with a PMH of acute on chronic hypoxemic and hypercapnic repsiratory failure, BiPAP dependence, COPD, CKD 3, HTN, HLD, RV diastolic dysfunction, tricuspid regurgitation, DM2 who presented to the ED for out-of-hospital PEA cardiac arrest s/p ROSC. On admission, CT head showed some early changes concerning for anoxic brain injury with subtle loss of gray-white differentiation.  cEEG with suppressed activity but was negative for any seizures.  MRI brain revealed widespread changes of anoxic brain injury. On exam, off sedation he is comatose, he does have some preserved brainstem reflexes but with poor motor response.  Discussed with family by bedside about poor long-term neurological prognosis especially given exam and neurodiagnostic findings of catastrophic anoxic brain injury.  Family requested more time to discuss with rest of the family to further clarify goals of care.  "      Recommendations               -discontinue Continuous EEG monitoring              -Euthermic, Euglycemia, normonatremia              -Continue supportive cares until family meeting to clarify goals of care    Patient Follow-up    - in 6 weeks with general neurology or stroke VERONICA ([phone])    We will continue to follow.       The Stroke Staff is Dr. Whyte.    Lance Simon MD  Vascular Neurology Fellow    To page me or covering stroke neurology team member, click here: [facility]  Choose \"On Call\" tab at top, then select \"NEUROLOGY/ALL SITES\" from middle drop-down box, press Enter, then look for \"stroke\" or \"telestroke\" for your site.    Physical Examination     Temp: 99.1  F (37.3  C) Temp src: Esophageal BP: 123/71 Pulse: 79   Resp: 15 SpO2: 94 % O2 Device: Mechanical Ventilator      Neurologic  Mental Status:   Comatose, opening eyes to vigorous sternal rubs, does not track, does not open eyes does not follow commands.  Cranial Nerves:  visual fields not intact to threat, PERRL, oculocephalic reflex negative, no cough, corneal reflex intact.  Motor: Mild withdrawal in bilateral upper and lower extremities, likely triple flexion.  Reflexes: Unequivocal  Coordination: Unable to test  Station/Gait:  deferred      Imaging/Labs   (Bolded imaging and labs new and/or personally reviewed or re-reviewed by me today)    MRI/Head CT CT Head   IMPRESSION:  1.  Some loss of gray-white matter differentiation involving the bilateral basal ganglia, thalami, and potentially the insular cortex compared to the previous exam. Hypoxic/ischemic injury in these locations is possible. The extent of ischemic injuries   would be more accurately evaluated by MRI.  2.  No hemorrhage, extra-axial collection, or mass effect.    MRI brain  IMPRESSION:  1.  Evidence of diffuse anoxic brain injury.  2.  No intracranial hemorrhage.   Intracranial Vasculature Not done   Cervical Vasculature Not done     Echocardiogram The left " ventricle is normal in size.  Left ventricular systolic function is normal.  The visual ejection fraction is 55-60%.  Normal left ventricular wall motion  The right ventricle is normal in structure, function and size.  Mild valvular aortic stenosis. The mean AoV pressure gradient is 13mmHg.  The rhythm was atrial fibrillation.  There is no comparison study available.   EKG/Telemetry AF     LDL  No lab value available in past 30 days   A1C  No lab value available in past 90 days   Troponin No lab value available in past 48 hrs     Other labs reviewed by me today:

## 2025-03-21 NOTE — PLAN OF CARE
Goal Outcome Evaluation:    Overall Patient Progress: no changeOverall Patient Progress: no change    Outcome Evaluation: Intubated, no sedation. EEG monitoring on. Neuro assessment unchanged. HR SR/ST. BP WNL. LS diminished, NPO, no BM, sanchez intact with adequate UOP. MRI done in AM. Plan for care conference.    Problem: Adult Inpatient Plan of Care  Goal: Absence of Hospital-Acquired Illness or Injury  Intervention: Identify and Manage Fall Risk  Recent Flowsheet Documentation  Taken 3/21/2025 0400 by Paul Mendoza RN  Safety Promotion/Fall Prevention:   room door open   room near nurse's station  Taken 3/21/2025 0000 by Paul Mendoza RN  Safety Promotion/Fall Prevention:   room door open   room near nurse's station  Taken 3/20/2025 2000 by Paul Mendoza RN  Safety Promotion/Fall Prevention:   room door open   room near nurse's station  Intervention: Prevent Skin Injury  Recent Flowsheet Documentation  Taken 3/21/2025 0600 by Paul Mendoza RN  Body Position:   turned   upper extremity elevated   lower extremity elevated  Taken 3/21/2025 0400 by Paul Mendoza RN  Body Position:   turned   upper extremity elevated   lower extremity elevated  Skin Protection:   adhesive use limited   silicone foam dressing in place   skin to skin areas padded   tubing/devices free from skin contact  Taken 3/21/2025 0200 by Paul Mendoza RN  Body Position:   turned   upper extremity elevated   lower extremity elevated  Taken 3/21/2025 0000 by Paul Mendoza RN  Body Position:   turned   upper extremity elevated   lower extremity elevated  Skin Protection:   adhesive use limited   silicone foam dressing in place   skin to skin areas padded   tubing/devices free from skin contact  Taken 3/20/2025 2200 by Paul Mendoza RN  Body Position:   turned   upper extremity elevated   lower extremity elevated  Taken 3/20/2025 2000 by Paul Mendoza RN  Body Position:   turned   upper extremity elevated   lower extremity  elevated  Skin Protection:   adhesive use limited   silicone foam dressing in place   skin to skin areas padded   tubing/devices free from skin contact  Intervention: Prevent and Manage VTE (Venous Thromboembolism) Risk  Recent Flowsheet Documentation  Taken 3/21/2025 0400 by Paul Mendoza RN  VTE Prevention/Management: SCDs on (sequential compression devices)  Taken 3/21/2025 0000 by Paul Mendoza RN  VTE Prevention/Management: SCDs on (sequential compression devices)  Taken 3/20/2025 2000 by Paul Mendoza RN  VTE Prevention/Management: SCDs on (sequential compression devices)     Problem: Mechanical Ventilation Invasive  Goal: Effective Communication  Intervention: Ensure Effective Communication  Recent Flowsheet Documentation  Taken 3/20/2025 2200 by Paul Mendoza RN  Trust Relationship/Rapport:   care explained   reassurance provided  Taken 3/20/2025 2000 by Paul Mendoza RN  Trust Relationship/Rapport:   care explained   reassurance provided  Goal: Optimal Device Function  Intervention: Optimize Device Care and Function  Recent Flowsheet Documentation  Taken 3/21/2025 0600 by Paul Mendoza RN  Oral Care:   swabbed with antiseptic solution   teeth brushed   tongue brushed  Taken 3/21/2025 0400 by Paul Mendoza RN  Oral Care:   swabbed with antiseptic solution   teeth brushed   tongue brushed  Taken 3/21/2025 0200 by Paul Mendoza RN  Oral Care:   swabbed with antiseptic solution   teeth brushed   tongue brushed  Taken 3/21/2025 0000 by Paul Mendoza RN  Oral Care:   swabbed with antiseptic solution   teeth brushed   tongue brushed  Taken 3/20/2025 2200 by Paul Mendoza RN  Oral Care:   swabbed with antiseptic solution   teeth brushed   tongue brushed  Taken 3/20/2025 2000 by Paul Mendoza RN  Oral Care:   swabbed with antiseptic solution   teeth brushed   tongue brushed  Goal: Mechanical Ventilation Liberation  Intervention: Promote Extubation and Mechanical Ventilation  Liberation  Recent Flowsheet Documentation  Taken 3/21/2025 0400 by Paul Mendoza RN  Medication Review/Management:   medications reviewed   high-risk medications identified  Environmental Support: comfort object encouraged  Taken 3/21/2025 0000 by Paul Mendoza RN  Medication Review/Management:   medications reviewed   high-risk medications identified  Environmental Support: comfort object encouraged  Taken 3/20/2025 2000 by Paul Mendoza RN  Medication Review/Management:   medications reviewed   high-risk medications identified  Environmental Support: comfort object encouraged  Goal: Optimal Nutrition Delivery  Intervention: Optimize Nutrition Delivery  Recent Flowsheet Documentation  Taken 3/21/2025 0400 by Paul Mendoza RN  Nutrition Support Management: weight trending reviewed  Taken 3/21/2025 0000 by Paul Mendoza RN  Nutrition Support Management: weight trending reviewed  Taken 3/20/2025 2000 by Paul Mendoza RN  Nutrition Support Management: weight trending reviewed  Goal: Absence of Device-Related Skin and Tissue Injury  Intervention: Maintain Skin and Tissue Health  Recent Flowsheet Documentation  Taken 3/21/2025 0400 by Paul Mendoza RN  Device Skin Pressure Protection:   adhesive use limited   tubing/devices free from skin contact  Taken 3/21/2025 0000 by Paul Mendoza RN  Device Skin Pressure Protection:   adhesive use limited   tubing/devices free from skin contact  Taken 3/20/2025 2000 by Paul Mendoza RN  Device Skin Pressure Protection:   adhesive use limited   tubing/devices free from skin contact  Goal: Absence of Ventilator-Induced Lung Injury  Intervention: Prevent Ventilator-Associated Pneumonia  Recent Flowsheet Documentation  Taken 3/21/2025 0600 by Paul Mendoza RN  Oral Care:   swabbed with antiseptic solution   teeth brushed   tongue brushed  Head of Bed (HOB) Positioning: HOB at 30 degrees  Taken 3/21/2025 0400 by Paul Mendoza RN  VAP Prevention Bundle:    HOB elevation maintained   oral care regularly provided   readiness to extubate assessed   stress ulcer prophylaxis provided   vent circuit breaks minimized   VTE prophylaxis provided  Oral Care:   swabbed with antiseptic solution   teeth brushed   tongue brushed  Head of Bed (HOB) Positioning: HOB at 30 degrees  VAP Prevention Measures: completed  Taken 3/21/2025 0200 by Paul Mendoza RN  Oral Care:   swabbed with antiseptic solution   teeth brushed   tongue brushed  Head of Bed (HOB) Positioning: HOB at 30 degrees  Taken 3/21/2025 0000 by Paul Mendoza RN  VAP Prevention Bundle:   HOB elevation maintained   oral care regularly provided   readiness to extubate assessed   stress ulcer prophylaxis provided   vent circuit breaks minimized   VTE prophylaxis provided  Oral Care:   swabbed with antiseptic solution   teeth brushed   tongue brushed  Head of Bed (HOB) Positioning: HOB at 30 degrees  VAP Prevention Measures: completed  Taken 3/20/2025 2200 by Paul Mendoza RN  Oral Care:   swabbed with antiseptic solution   teeth brushed   tongue brushed  Head of Bed (HOB) Positioning: HOB at 30 degrees  Taken 3/20/2025 2000 by Paul Mendoza RN  VAP Prevention Bundle:   HOB elevation maintained   oral care regularly provided   readiness to extubate assessed   stress ulcer prophylaxis provided   vent circuit breaks minimized   VTE prophylaxis provided  Oral Care:   swabbed with antiseptic solution   teeth brushed   tongue brushed  Head of Bed (HOB) Positioning: HOB at 30 degrees  VAP Prevention Measures: completed

## 2025-03-21 NOTE — PROGRESS NOTES
[facility] ICU RESPIRATORY NOTE        Date of Admission: 3/18/2025    Date of Intubation (most recent): 3/18/25    Reason for Mechanical Ventilation: Cardiac arrest    Number of Days on Mechanical Ventilation: 4    Met Criteria for Spontaneous Breathing Trial: No    Bite Block: No    Significant Events Today: Patient was taken to MRI for scan    ABG Results:   Recent Labs   Lab 03/20/25  1302 03/20/25  0441 03/19/25  1115 03/19/25  0520 03/19/25  0028 03/18/25  2202 03/18/25  2142 03/18/25  1821   PH  --   --   --   --   --  7.39  --  7.30*   PCO2  --   --   --   --   --  80*  --  87*   PO2  --   --   --   --   --  70*  --  57*   HCO3  --   --   --   --   --  48*  --  42*   O2PER 60 60 60 80   < > 70   < >  --     < > = values in this interval not displayed.         Current Vent Settings: FiO2 (%): 60 %, Resp: 20, Vent Mode: CMV/AC (autoflow), Resp Rate (Set): 10 breaths/min, Tidal Volume (Set, mL): 450 mL, PEEP (cm H2O): 10 cmH2O, Resp Rate (Set): 10 breaths/min, Tidal Volume (Set, mL): 450 mL, PEEP (cm H2O): 10 cmH2O    Skin Assessment: completed    Plan: Continue full vent support and assess daily for weaning readiness.    Anna Palumbo, RT on 3/21/2025 at 5:51 AM

## 2025-03-21 NOTE — PROGRESS NOTES
Comprehensive Daily ICU Note        Miguel Rivera MRN# [id number]   Age: 59 year old YOB: 1965     Date of Admission: 3/18/2025    Primary care provider: No Ref-Primary, Physician     CODE STATUS: Full      Problem List:   Active Problems:    Anoxic brain damage (H)    Cardiopulmonary arrest (H)    Closed fracture of multiple ribs, unspecified laterality, initial encounter    Aspiration pneumonia (H)    Acute on chronic respiratory failure with hypoxia and hypercapnia (H)    COPD exacerbation (H)            Subjective/ Last 24 hours:   Events: 59-year-old man with COPD hypertension kidney disease and chronic hypercapnick and hypoxemic respiratory failure requiring oxygen who was found unresponsive by his family with disconnected oxygen.  Unknown downtime.  EMS found patient in asystole.  Had return of spontaneous circulation approximately 30 minutes after EMS arrival.  Admitted to ICU.  EEG showed severe encephalopathy.  Purulent secretions and hypoxic with airflow obstruction.  Today, 3/21, MRI consistent with severe anoxic encephalopathy.  EEG with very little spontaneous activity and patient unresponsive to painful stimuli       Physical Examination:       Temp:  [98.6  F (37  C)-99.7  F (37.6  C)] 99.1  F (37.3  C)  Pulse:  [] 90  Resp:  [9-23] 16  BP: (104-147)/(55-84) 123/71  FiO2 (%):  [40 %-60 %] 40 %  SpO2:  [92 %-100 %] 92 %  General: Stated age intubated  HEENT: ET and OG with purulent ET tube secretions  Lungs: Synchronous with ventilator but high peak pressures and incomplete exhalation   CVS: Regular rate rhythm with systolic murmur  Abdomen: Obese  Extremities/musculoskeletal/skin: Grossly normal  Neurology/Psychiatry: Does not open eyes or respond to pain   exam of Line sites: picc line           Medications:     Current Facility-Administered Medications   Medication Dose Route Frequency Provider Last Rate Last Admin    chlorhexidine (PERIDEX) 0.12 % solution 15 mL  15 mL  Mouth/Throat Q12H Annette Taylor PA-C   15 mL at 03/21/25 0912    enoxaparin ANTICOAGULANT (LOVENOX) injection 40 mg  40 mg Subcutaneous Q24H Kendra Matt MD   40 mg at 03/20/25 1253    insulin aspart (NovoLOG) injection (RAPID ACTING)  1-7 Units Subcutaneous Q4H Kam Hutchins PA-C   1 Units at 03/20/25 0105    ipratropium - albuterol 0.5 mg/2.5 mg/3 mL (DUONEB) neb solution 3 mL  3 mL Nebulization 4x daily Kendra Matt MD   3 mL at 03/21/25 0750    methylPREDNISolone sodium succinate (SOLU-MEDROL) injection 40 mg  40 mg Intravenous Q24H Kendra Matt MD   40 mg at 03/20/25 1253    pantoprazole (PROTONIX) 2 mg/mL suspension 40 mg  40 mg Per Feeding Tube QAM  Annette Taylor PA-C   40 mg at 03/21/25 0909    Or    pantoprazole (PROTONIX) IV push injection 40 mg  40 mg Intravenous QAM  Annette Taylor PA-C        piperacillin-tazobactam (ZOSYN) 4.5 g vial to attach to  mL bag  4.5 g Intravenous Q6H Kendra Matt MD   4.5 g at 03/21/25 0918    sodium chloride (PF) 0.9% PF flush 10-40 mL  10-40 mL Intracatheter Q7 Days Adriel Meléndez MD   10 mL at 03/19/25 1103    sodium chloride (PF) 0.9% PF flush 10-40 mL  10-40 mL Intracatheter Daily Adriel Meléndez MD   10 mL at 03/21/25 0919        Current Facility-Administered Medications   Medication Dose Route Frequency Provider Last Rate Last Admin    lactated ringers infusion   Intravenous Continuous Kam Hutchins PA-C 50 mL/hr at 03/19/25 2257 New Bag at 03/19/25 2257    propofol (DIPRIVAN) infusion  5-75 mcg/kg/min Intravenous Continuous Annette Taylor PA-C   Stopped at 03/20/25 1107    And    Medication Instruction   Does not apply Continuous PRN Annette Taylor PA-C        sodium chloride 0.9 % infusion   Intravenous Continuous Adriel Meléndez MD   Stopped at 03/19/25 1212       Current Facility-Administered Medications   Medication Dose Route  Frequency Provider Last Rate Last Admin    acetaminophen (TYLENOL) tablet 650 mg  650 mg Oral Q6H PRN Annette Taylor PA-C        Or    acetaminophen (TYLENOL) oral liquid 650 mg  650 mg Per NG tube Q6H PRN Annette Taylor PA-C        albuterol (PROVENTIL) neb solution 2.5 mg  2.5 mg Nebulization Q4H PRN Annette Taylor PA-C        glucose gel 15-30 g  15-30 g Oral Q15 Min PRN Kam Hutchins PA-C        Or    dextrose 50 % injection 25-50 mL  25-50 mL Intravenous Q15 Min PRN Kam Hutchins PA-C        Or    glucagon injection 1 mg  1 mg Subcutaneous Q15 Min PRN Kam Hutchins PA-C        HYDROmorphone (DILAUDID) injection 0.2 mg  0.2 mg Intravenous Q2H PRN Annette Taylor PA-C   0.2 mg at 03/21/25 0408    lidocaine (LMX4) cream   Topical Q1H PRN Adriel Mleéndez MD        lidocaine 1 % 0.1-5 mL  0.1-5 mL Other Q1H PRN Adriel Meléndez MD   2 mL at 03/19/25 0935    propofol (DIPRIVAN) bolus from bag or syringe pump  10 mg Intravenous Q15 Min PRN Annette Taylor PA-C        And    Medication Instruction   Does not apply Continuous PRN Annette Taylor PA-C        [Held by provider] midazolam (VERSED) injection 4 mg  4 mg Intravenous Q1H PRN Mir Jacobson MD   4 mg at 03/18/25 1924    naloxone (NARCAN) injection 0.2 mg  0.2 mg Intravenous Q2 Min PRN John Ahuja RPH        Or    naloxone (NARCAN) injection 0.4 mg  0.4 mg Intravenous Q2 Min PRN John Ahuja RPH        Or    naloxone (NARCAN) injection 0.2 mg  0.2 mg Intramuscular Q2 Min PRN John Ahuja RPH        Or    naloxone (NARCAN) injection 0.4 mg  0.4 mg Intramuscular Q2 Min PRN John Ahuja RPH        ondansetron (ZOFRAN ODT) ODT tab 4 mg  4 mg Oral Q6H PRN Annette Taylor PA-C        Or    ondansetron (ZOFRAN) injection 4 mg  4 mg Intravenous Q6H PRN Annette Taylor PA-C        polyethylene glycol (MIRALAX) Packet 17 g  17 g Oral Daily PRN Annette Taylor,  DAGO        senna-docusate (SENOKOT-S/PERICOLACE) 8.6-50 MG per tablet 1 tablet  1 tablet Oral BID PRN Annette Taylor PA-C        Or    senna-docusate (SENOKOT-S/PERICOLACE) 8.6-50 MG per tablet 2 tablet  2 tablet Oral BID PRN Annette Taylor PA-C        sodium chloride (PF) 0.9% PF flush 10-20 mL  10-20 mL Intracatheter q1 min prn Adriel Meléndez MD             Imaging and Other Data:        MR Brain w/o & w Contrast  Narrative: EXAM: MR BRAIN W/O and W CONTRAST  LOCATION: [facility]  DATE: 3/21/2025    INDICATION: Anoxic brain injury  COMPARISON: Head CT 3/18/2025  CONTRAST: 9 ml Gadavist  TECHNIQUE: Routine multiplanar multisequence head MRI without and with intravenous contrast.    FINDINGS:  INTRACRANIAL CONTENTS: Diffuse abnormal restricted diffusion involving the supratentorial cortex. Additional involvement in the basal ganglia. There is associated FLAIR signal hyperintensity. There is gyral swelling resulting in some sulcal effacement.   No shift of midline structures or sellar tonsillar herniation. No intracranial hemorrhage or pathologic enhancement.    SELLA: No abnormality accounting for technique.    OSSEOUS STRUCTURES/SOFT TISSUES: Normal marrow signal. The major intracranial vascular flow voids are maintained.     ORBITS: No abnormality accounting for technique.     SINUSES/MASTOIDS: Diffuse mucosal thickening. Complete/near complete opacification of the mastoid air cells bilaterally. No apparent mass in the posterior nasopharynx or skull base.   Impression: IMPRESSION:  1.  Evidence of diffuse anoxic brain injury.  2.  No intracranial hemorrhage.             Assessment and Plan:     Summary:  Miguel Rivera is a 59 year old male admitted on 3/18/2025 for cardiac arrest.  I have personally reviewed the daily labs, imaging studies, cultures and discussed the case with referring physician and consulting physicians. My assessment and plan as  follows:    Neurology and Psychiatry:  Assessment:   Current Facility-Administered Medications   Medication Dose Route Frequency Provider Last Rate Last Admin    lactated ringers infusion   Intravenous Continuous Her-Kam George PA-C 50 mL/hr at 03/19/25 2257 New Bag at 03/19/25 2257    propofol (DIPRIVAN) infusion  5-75 mcg/kg/min Intravenous Continuous Annette Taylor PA-C   Stopped at 03/20/25 1107    And    Medication Instruction   Does not apply Continuous PRN Annette Taylor PA-C        sodium chloride 0.9 % infusion   Intravenous Continuous Adriel Meléndez MD   Stopped at 03/19/25 1212       Severe anoxic encephalopathy.  Shared this with family this morning in conjunction with neurologist.  Recommended comfort care      Plan   Family discussing with other family members.  Will reconvene later today    Cardiovascular and Hemodynamics:  Assessment:  Temp:  [98.6  F (37  C)-99.7  F (37.6  C)] 99.1  F (37.3  C)  Pulse:  [] 90  Resp:  [9-23] 16  BP: (104-147)/(55-84) 123/71  FiO2 (%):  [40 %-60 %] 40 %  SpO2:  [92 %-100 %] 92 %   Intake/Output Summary (Last 24 hours) at 3/20/2025 1543      Intake/Output Summary (Last 24 hours) at 3/21/2025 1118  Last data filed at 3/21/2025 0918  Gross per 24 hour   Intake 1430.5 ml   Output 1745 ml   Net -314.5 ml     Net IO Since Admission: -621.99 mL [03/21/25 1118]      Echo result w/o MOPS: Interpretation Summary The left ventricle is normal in size.Left ventricular systolic function is normal.The visual ejection fraction is 55-60%.Normal left ventricular wall motionThe right ventricle is normal in structure, function and size.Mild valvular aortic stenosis. The mean AoV pressure gradient is 13mmHg.The rhythm was atrial fibrillation.There is no comparison study available.      Vasoplegic/cardiogenic shock.  This is improved.  Asystolic arrest.  From history respiratory as a primary source seems most likely.  EKG not consistent with STEMI.   Troponin minimally elevated  Medical problems include hypertension dyslipidemia and atrial fibrillation and diastolic heart failure and tricuspid regurgitation.  Blood pressures now trending higher    ectopy that has resolved.     Plan  Monitor hemodynamics closely.  Initiate antihypertensive if necessary.  There is no indication for evaluation for coronary artery disease  Currently full code.  Will readdress this once family have had a chance to talk amongst themselves.    Pulmonary/ID:  Assessment  Temp (24hrs), Av.2  F (37.3  C), Min:98.6  F (37  C), Max:99.7  F (37.6  C)        FiO2 (%): 40 %, Resp: 16, Vent Mode: CMV/AC, Resp Rate (Set): 10 breaths/min, Tidal Volume (Set, mL): 450 mL, PEEP (cm H2O): 10 cmH2O, Resp Rate (Set): 10 breaths/min, Tidal Volume (Set, mL): 450 mL, PEEP (cm H2O): 10 cmH2O Arterial Blood Gas  Recent Labs   Lab 25  1302 25  0441 25  1115 25  0520 25  0028 25  2202 25  2142 25  1821   PH  --   --   --   --   --  7.39  --  7.30*   PCO2  --   --   --   --   --  80*  --  87*   PO2  --   --   --   --   --  70*  --  57*   HCO3  --   --   --   --   --  48*  --  42*   O2PER 60 60 60 80   < > 70   < >  --     < > = values in this interval not displayed.     pH Venous   Date Value Ref Range Status   2025 7.35 7.32 - 7.43 Final   2025 7.39 7.32 - 7.43 Final   2025 7.36 7.32 - 7.43 Final     pCO2 Venous   Date Value Ref Range Status   2025 85 (HH) 40 - 50 mm Hg Final   2025 83 (HH) 40 - 50 mm Hg Final   2025 87 (HH) 40 - 50 mm Hg Final       Acute on chronic hypoxic and hypercapnic respiratory failure.  pH 7.3 pCO2 90 suggesting profound chronic hypercapnia.  On ventilator today, 3/20, evidence of severe airflow obstruction.  Improved somewhat after decreasing respiratory rate and increasing tidal volume to 9 mL/kg. Pressure support was not helpful.  aspiration pneumonia: Low-grade fever, leukocytosis, purulent  secretions, Streptococcus growing in sputum.  On antibiotics  plan:  Continue antibiotics same until goals of care are established  continue bronchodilators and high steroids  Continue current ventilator settings  Vap precautions: HOB 30, chlorhexidine    GI and Nutrition:  Assessment:  AST   Date Value Ref Range Status   03/20/2025 60 (H) 0 - 45 U/L Final   03/19/2025   Final     Comment:     Unsatisfactory specimen - hemolyzed     03/18/2025 71 (H) 0 - 45 U/L Final     ALT   Date Value Ref Range Status   03/20/2025 30 0 - 70 U/L Final   03/19/2025 40 0 - 70 U/L Final   03/18/2025 48 0 - 70 U/L Final     Bilirubin Total   Date Value Ref Range Status   03/20/2025 0.2 <=1.2 mg/dL Final   03/19/2025 0.2 <=1.2 mg/dL Final   03/18/2025 0.2 <=1.2 mg/dL Final     Protein Total   Date Value Ref Range Status   03/20/2025 6.1 (L) 6.4 - 8.3 g/dL Final   03/19/2025 6.1 (L) 6.4 - 8.3 g/dL Final   03/18/2025 6.9 6.4 - 8.3 g/dL Final     Albumin   Date Value Ref Range Status   03/20/2025 2.9 (L) 3.5 - 5.2 g/dL Final   03/19/2025 2.9 (L) 3.5 - 5.2 g/dL Final   03/18/2025 3.4 (L) 3.5 - 5.2 g/dL Final     Alkaline Phosphatase   Date Value Ref Range Status   03/20/2025 91 40 - 150 U/L Final   03/19/2025 107 40 - 150 U/L Final   03/18/2025 134 40 - 150 U/L Final     Transaminitis likely secondary to arrest given elevated lactic acid.  Improving.   Nutritional Plan: Orders Placed This Encounter      NPO for Medical/Clinical Reasons Except for: No Exceptions      Plan:   Monitor   Stress ulcer: PPI  Defer tube feeding    Renal, Fluid and Electrolytes:  Assessment:  BMP  Recent Labs   Lab 03/21/25  0413 03/20/25  0441 03/19/25  0520 03/18/25  2142 03/18/25  1728   NA  --  143 143 142 144   POTASSIUM 4.6 3.7 4.3 4.3 4.6   CHLORIDE  --  95* 94* 92* 93*   CO2  --  45* 44* 40* 36*   ANIONGAP  --  3* 5* 10 15   BUN  --  30.5* 25.6* 19.6 15.8   CR  --  0.76 0.69 0.67 0.68   GFRESTIMATED  --  >90 >90 >90 >90   LOLA  --  8.2* 8.2* 8.9 9.5   MAG  "2.2 2.1 2.2 1.8 2.3   PHOS  --  2.4* 3.1 4.0  --      Lactic Acid:  Lactic Acid   Date Value Ref Range Status   2025 0.8 0.7 - 2.0 mmol/L Final   2025 0.7 0.7 - 2.0 mmol/L Final   2025 2.4 (H) 0.7 - 2.0 mmol/L Final     No results found for: \"KETONE\"      Intake/Output Summary (Last 24 hours) at 3/21/2025 1121  Last data filed at 3/21/2025 0918  Gross per 24 hour   Intake 1430.5 ml   Output 1745 ml   Net -314.5 ml     Net IO Since Admission: -621.99 mL [25 1121]      Respiratory acidosis with metabolic compensation.  Urine output adequate  Hypophosphatemia that was replaced on 3/20.  Repeat pending today.  Magnesium and potassium normal    Plan:   Maintenance IV fluid to continue  Replace phosphorus if extremely abnormal  monitor I/O, urine output, kidney function and electrolytes and treat as needed. Avoid nephrotoxic agents.    Infectious Disease:  Assessment:  Temp (24hrs), Av.2  F (37.3  C), Min:98.6  F (37  C), Max:99.7  F (37.6  C)        7-Day Micro Results       Collected Updated Procedure Result Status      2025 0426 2025 0756 Respiratory Aerobic Bacterial Culture with Gram Stain [[id number]]   (Abnormal)   Aspirate from Endotracheal    Final result Component Value   Culture 4+ Normal Jorge   Gram Stain Result >25 PMNs/low power field    4+ Gram positive bacilli resembling diphtheroids    1+ Gram positive cocci               2025 2207 2025 0752 Respiratory Aerobic Bacterial Culture with Gram Stain [[id number]]   (Abnormal)   Sputum from Endotracheal    Preliminary result Component Value   Culture Culture in progress  [P]     4+ Normal jorge  [P]    Gram Stain Result >25 PMNs/low power field  [P]     3+ Gram positive bacilli resembling diphtheroids  [P]     1+ Gram positive cocci  [P]                2025 1728 2025 2316 Blood Culture Peripheral Blood [[id number]]   Peripheral Blood    Preliminary result Component Value   Culture No growth " "after 2 days  [P]                2025 1728 2025 Blood Culture Peripheral Blood [[id number]]   Peripheral Blood    Preliminary result Component Value   Culture No growth after 2 days  [P]                      See respiratory section        Hematology and Oncology:  Assessment:  Recent Labs   Lab 25  0441 25  0520 25  2142 25  1728   WBC 12.2* 12.9* 10.9 10.1   RBC 3.03* 3.20* 3.68* 3.46*   HGB 8.6* 9.0* 10.4* 9.9*   HCT 30.0* 31.2* 36.9* 36.7*   MCV 99 98 100 106*   MCH 28.4 28.1 28.3 28.6   MCHC 28.7* 28.8* 28.2* 27.0*   RDW 14.6 14.4 14.4 14.3    225 256 238     No lab results found in last 7 days.  No results found for: \"AAUFH\"      1.  Anemia.  No signs of bleeding  2. Leukocytosis secondary to stress and likely aspiration pneumonia.     Plan:  Treat with antibiotics   anticoagulation:  Enoxaparin at prophylactic dose    Endocrinology and Rheumatology:  Assessment:  Stress hyperglycemia  Recent Labs   Lab 25  0914 25  0346 25  0023 25  2035 25  1625 25  1301   GLC 80 104* 129* 132* 116* 117*      Plan:  Insulin orders in place.  Has not required  Steroids    Skin and Musculoskeletal:  Assessment:  Standard skin cares        Billin minutes spent for critical care time exclusive of procedures        Family update: Bedside today        Clinically Significant Risk Factors     # Obesity: Estimated body mass index is 39.11 kg/m  as calculated from the following:    Height as of this encounter: 1.575 m (5' 2\").    Weight as of this encounter: 97 kg (213 lb 13.5 oz).         [facility] ICU Rounding checklist    Assessment of central venous catheter access Central access present and needed   Assessment of urinary catheter use Blount present and needed.  Indication:  obstruction and hemodynamic instability   DVT prophylaxis Subcutaneous heparin product (heparin or LMWH)         This document was generated with the assistance of voice " recognition software. Unintentional transcription errors may occur. Please contact the author for any clarification.   Kendra Matt MD

## 2025-03-22 LAB
ANION GAP SERPL CALCULATED.3IONS-SCNC: 9 MMOL/L (ref 7–15)
BACTERIA SPT CULT: ABNORMAL
BACTERIA SPT CULT: ABNORMAL
BASE EXCESS BLDV CALC-SCNC: 8.1 MMOL/L (ref -3–3)
BUN SERPL-MCNC: 25.4 MG/DL (ref 8–23)
CALCIUM SERPL-MCNC: 8.4 MG/DL (ref 8.8–10.4)
CHLORIDE SERPL-SCNC: 99 MMOL/L (ref 98–107)
CREAT SERPL-MCNC: 0.69 MG/DL (ref 0.67–1.17)
CREAT SERPL-MCNC: 0.71 MG/DL (ref 0.67–1.17)
EGFRCR SERPLBLD CKD-EPI 2021: >90 ML/MIN/1.73M2
EGFRCR SERPLBLD CKD-EPI 2021: >90 ML/MIN/1.73M2
ERYTHROCYTE [DISTWIDTH] IN BLOOD BY AUTOMATED COUNT: 14.6 % (ref 10–15)
GLUCOSE BLDC GLUCOMTR-MCNC: 115 MG/DL (ref 70–99)
GLUCOSE BLDC GLUCOMTR-MCNC: 117 MG/DL (ref 70–99)
GLUCOSE BLDC GLUCOMTR-MCNC: 79 MG/DL (ref 70–99)
GLUCOSE BLDC GLUCOMTR-MCNC: 81 MG/DL (ref 70–99)
GLUCOSE BLDC GLUCOMTR-MCNC: 90 MG/DL (ref 70–99)
GLUCOSE BLDC GLUCOMTR-MCNC: 96 MG/DL (ref 70–99)
GLUCOSE SERPL-MCNC: 89 MG/DL (ref 70–99)
GRAM STAIN RESULT: ABNORMAL
HCO3 BLDV-SCNC: 34 MMOL/L (ref 21–28)
HCO3 SERPL-SCNC: 29 MMOL/L (ref 22–29)
HCT VFR BLD AUTO: 29.5 % (ref 40–53)
HGB BLD-MCNC: 8.7 G/DL (ref 13.3–17.7)
MAGNESIUM SERPL-MCNC: 1.8 MG/DL (ref 1.7–2.3)
MCH RBC QN AUTO: 28.5 PG (ref 26.5–33)
MCHC RBC AUTO-ENTMCNC: 29.5 G/DL (ref 31.5–36.5)
MCV RBC AUTO: 97 FL (ref 78–100)
O2/TOTAL GAS SETTING VFR VENT: 60 %
OXYHGB MFR BLDV: 82 % (ref 70–75)
PCO2 BLDV: 54 MM HG (ref 40–50)
PH BLDV: 7.41 [PH] (ref 7.32–7.43)
PHOSPHATE SERPL-MCNC: 1.9 MG/DL (ref 2.5–4.5)
PLATELET # BLD AUTO: 257 10E3/UL (ref 150–450)
PLATELET # BLD AUTO: 257 10E3/UL (ref 150–450)
PO2 BLDV: 49 MM HG (ref 25–47)
POTASSIUM SERPL-SCNC: 4.4 MMOL/L (ref 3.4–5.3)
POTASSIUM SERPL-SCNC: 4.5 MMOL/L (ref 3.4–5.3)
RBC # BLD AUTO: 3.05 10E6/UL (ref 4.4–5.9)
SAO2 % BLDV: 83.7 % (ref 70–75)
SODIUM SERPL-SCNC: 137 MMOL/L (ref 135–145)
WBC # BLD AUTO: 10.7 10E3/UL (ref 4–11)

## 2025-03-22 PROCEDURE — 250N000013 HC RX MED GY IP 250 OP 250 PS 637: Performed by: SURGERY

## 2025-03-22 PROCEDURE — 99292 CRITICAL CARE ADDL 30 MIN: CPT | Performed by: INTERNAL MEDICINE

## 2025-03-22 PROCEDURE — 258N000003 HC RX IP 258 OP 636: Performed by: PHYSICIAN ASSISTANT

## 2025-03-22 PROCEDURE — 94003 VENT MGMT INPAT SUBQ DAY: CPT

## 2025-03-22 PROCEDURE — 84100 ASSAY OF PHOSPHORUS: CPT | Performed by: SURGERY

## 2025-03-22 PROCEDURE — 99291 CRITICAL CARE FIRST HOUR: CPT | Mod: GC | Performed by: PSYCHIATRY & NEUROLOGY

## 2025-03-22 PROCEDURE — 999N000157 HC STATISTIC RCP TIME EA 10 MIN

## 2025-03-22 PROCEDURE — 250N000011 HC RX IP 250 OP 636: Performed by: INTERNAL MEDICINE

## 2025-03-22 PROCEDURE — 94640 AIRWAY INHALATION TREATMENT: CPT

## 2025-03-22 PROCEDURE — 80048 BASIC METABOLIC PNL TOTAL CA: CPT | Performed by: INTERNAL MEDICINE

## 2025-03-22 PROCEDURE — 83735 ASSAY OF MAGNESIUM: CPT | Performed by: SURGERY

## 2025-03-22 PROCEDURE — 85027 COMPLETE CBC AUTOMATED: CPT | Performed by: INTERNAL MEDICINE

## 2025-03-22 PROCEDURE — 200N000001 HC R&B ICU

## 2025-03-22 PROCEDURE — 99291 CRITICAL CARE FIRST HOUR: CPT | Performed by: INTERNAL MEDICINE

## 2025-03-22 PROCEDURE — 82805 BLOOD GASES W/O2 SATURATION: CPT | Performed by: INTERNAL MEDICINE

## 2025-03-22 PROCEDURE — 82565 ASSAY OF CREATININE: CPT | Performed by: INTERNAL MEDICINE

## 2025-03-22 PROCEDURE — 250N000013 HC RX MED GY IP 250 OP 250 PS 637

## 2025-03-22 PROCEDURE — 999N000009 HC STATISTIC AIRWAY CARE

## 2025-03-22 PROCEDURE — 250N000009 HC RX 250: Performed by: INTERNAL MEDICINE

## 2025-03-22 PROCEDURE — 258N000003 HC RX IP 258 OP 636: Performed by: SURGERY

## 2025-03-22 PROCEDURE — 94640 AIRWAY INHALATION TREATMENT: CPT | Mod: 76

## 2025-03-22 PROCEDURE — 250N000011 HC RX IP 250 OP 636

## 2025-03-22 RX ORDER — CEFTRIAXONE 2 G/1
2 INJECTION, POWDER, FOR SOLUTION INTRAMUSCULAR; INTRAVENOUS EVERY 24 HOURS
Status: DISCONTINUED | OUTPATIENT
Start: 2025-03-22 | End: 2025-03-24

## 2025-03-22 RX ORDER — MAGNESIUM OXIDE 400 MG/1
400 TABLET ORAL EVERY 4 HOURS
Status: COMPLETED | OUTPATIENT
Start: 2025-03-22 | End: 2025-03-22

## 2025-03-22 RX ADMIN — POTASSIUM & SODIUM PHOSPHATES POWDER PACK 280-160-250 MG 2 PACKET: 280-160-250 PACK at 15:59

## 2025-03-22 RX ADMIN — CHLORHEXIDINE GLUCONATE 15 ML: 1.2 RINSE ORAL at 07:44

## 2025-03-22 RX ADMIN — ENOXAPARIN SODIUM 40 MG: 40 INJECTION SUBCUTANEOUS at 12:43

## 2025-03-22 RX ADMIN — SODIUM CHLORIDE: 0.9 INJECTION, SOLUTION INTRAVENOUS at 11:10

## 2025-03-22 RX ADMIN — IPRATROPIUM BROMIDE AND ALBUTEROL SULFATE 3 ML: .5; 3 SOLUTION RESPIRATORY (INHALATION) at 08:10

## 2025-03-22 RX ADMIN — PANTOPRAZOLE SODIUM 40 MG: 40 INJECTION, POWDER, FOR SOLUTION INTRAVENOUS at 07:45

## 2025-03-22 RX ADMIN — ACETAMINOPHEN 650 MG: 325 SUSPENSION ORAL at 07:47

## 2025-03-22 RX ADMIN — Medication 400 MG: at 11:17

## 2025-03-22 RX ADMIN — CEFTRIAXONE SODIUM 2 G: 2 INJECTION, POWDER, FOR SOLUTION INTRAMUSCULAR; INTRAVENOUS at 16:00

## 2025-03-22 RX ADMIN — IPRATROPIUM BROMIDE AND ALBUTEROL SULFATE 3 ML: .5; 3 SOLUTION RESPIRATORY (INHALATION) at 10:57

## 2025-03-22 RX ADMIN — METHYLPREDNISOLONE SODIUM SUCCINATE 40 MG: 40 INJECTION, POWDER, FOR SOLUTION INTRAMUSCULAR; INTRAVENOUS at 12:43

## 2025-03-22 RX ADMIN — Medication 400 MG: at 07:45

## 2025-03-22 RX ADMIN — POTASSIUM & SODIUM PHOSPHATES POWDER PACK 280-160-250 MG 2 PACKET: 280-160-250 PACK at 07:45

## 2025-03-22 RX ADMIN — SODIUM CHLORIDE, SODIUM LACTATE, POTASSIUM CHLORIDE, AND CALCIUM CHLORIDE: .6; .31; .03; .02 INJECTION, SOLUTION INTRAVENOUS at 12:35

## 2025-03-22 RX ADMIN — POTASSIUM & SODIUM PHOSPHATES POWDER PACK 280-160-250 MG 2 PACKET: 280-160-250 PACK at 11:17

## 2025-03-22 RX ADMIN — IPRATROPIUM BROMIDE AND ALBUTEROL SULFATE 3 ML: .5; 3 SOLUTION RESPIRATORY (INHALATION) at 15:36

## 2025-03-22 RX ADMIN — PIPERACILLIN AND TAZOBACTAM 4.5 G: 4; .5 INJECTION, POWDER, FOR SOLUTION INTRAVENOUS at 10:04

## 2025-03-22 RX ADMIN — PIPERACILLIN AND TAZOBACTAM 4.5 G: 4; .5 INJECTION, POWDER, FOR SOLUTION INTRAVENOUS at 03:40

## 2025-03-22 RX ADMIN — CHLORHEXIDINE GLUCONATE 15 ML: 1.2 RINSE ORAL at 20:44

## 2025-03-22 RX ADMIN — IPRATROPIUM BROMIDE AND ALBUTEROL SULFATE 3 ML: .5; 3 SOLUTION RESPIRATORY (INHALATION) at 20:09

## 2025-03-22 ASSESSMENT — ACTIVITIES OF DAILY LIVING (ADL)
ADLS_ACUITY_SCORE: 79
ADLS_ACUITY_SCORE: 83
ADLS_ACUITY_SCORE: 79
ADLS_ACUITY_SCORE: 83
ADLS_ACUITY_SCORE: 79
ADLS_ACUITY_SCORE: 83
ADLS_ACUITY_SCORE: 79
ADLS_ACUITY_SCORE: 83
ADLS_ACUITY_SCORE: 79
ADLS_ACUITY_SCORE: 83

## 2025-03-22 NOTE — PROGRESS NOTES
Comprehensive Daily ICU Note        Miguel Rivera MRN# [id number]   Age: 59 year old YOB: 1965     Date of Admission: 3/18/2025    Primary care provider: No Ref-Primary, Physician     CODE STATUS: Full      Problem List:   Active Problems:    Anoxic brain damage (H)    Cardiopulmonary arrest (H)    Closed fracture of multiple ribs, unspecified laterality, initial encounter    Aspiration pneumonia (H)    Acute on chronic respiratory failure with hypoxia and hypercapnia (H)    COPD exacerbation (H)            Subjective/ Last 24 hours:   Events: 59-year-old man with COPD hypertension kidney disease and chronic hypercapnick and hypoxemic respiratory failure requiring oxygen who was found unresponsive by his family with disconnected oxygen.  Unknown downtime.  EMS found patient in asystole.  Had return of spontaneous circulation approximately 30 minutes after EMS arrival.  Admitted to ICU.  EEG showed severe encephalopathy.  Purulent secretions and hypoxic with airflow obstruction.  Today, 3/21, MRI consistent with severe anoxic encephalopathy.  EEG with very little spontaneous activity and patient unresponsive to painful stimuli       Physical Examination:       Temp:  [98.1  F (36.7  C)-99  F (37.2  C)] 98.4  F (36.9  C)  Pulse:  [] 79  Resp:  [12-22] 22  BP: (135-146)/() 146/85  FiO2 (%):  [40 %-80 %] 50 %  SpO2:  [92 %-97 %] 97 %  General: Intubated, eyes closed   HEENT: ET and OG with intermittent thick ET tube secretions  Lungs: Synchronous with ventilator with breath rate above set rate, inspiratory rales in bases of posterior lung fields   CVS: RR, systolic murmur  Abdomen: Protruding abdomen, hypoactive BS  Extremities/musculoskeletal/skin: Grossly normal  Neurology/Psychiatry: Does not open eyes spontaneous, pupils reactive but roving eye movements, triple flexion withdrawal to pain in all extremities but no spontaneous movements   exam of Line sites: picc line            Medications:     Current Facility-Administered Medications   Medication Dose Route Frequency Provider Last Rate Last Admin    cefTRIAXone (ROCEPHIN) 2 g vial to attach to  ml bag for ADULTS or NS 50 ml bag for PEDS  2 g Intravenous Q24H Hilton Schwartz MD   2 g at 03/22/25 1600    chlorhexidine (PERIDEX) 0.12 % solution 15 mL  15 mL Mouth/Throat Q12H Annette Taylor PA-C   15 mL at 03/22/25 0744    enoxaparin ANTICOAGULANT (LOVENOX) injection 40 mg  40 mg Subcutaneous Q24H Kendra Matt MD   40 mg at 03/22/25 1243    insulin aspart (NovoLOG) injection (RAPID ACTING)  1-7 Units Subcutaneous Q4H Kam Hutchins PA-C   1 Units at 03/20/25 0105    ipratropium - albuterol 0.5 mg/2.5 mg/3 mL (DUONEB) neb solution 3 mL  3 mL Nebulization 4x daily Kendra Matt MD   3 mL at 03/22/25 1536    methylPREDNISolone sodium succinate (SOLU-MEDROL) injection 40 mg  40 mg Intravenous Q24H Kendra Matt MD   40 mg at 03/22/25 1243    pantoprazole (PROTONIX) 2 mg/mL suspension 40 mg  40 mg Per Feeding Tube QAM  Annette Taylor PA-C   40 mg at 03/21/25 0909    Or    pantoprazole (PROTONIX) IV push injection 40 mg  [facility]   40 mg at 03/22/25 0745    sodium chloride (PF) 0.9% PF flush 10-40 mL  10-40 mL Intracatheter Q7 Days Adriel Meléndez MD   10 mL at 03/19/25 1103    sodium chloride (PF) 0.9% PF flush 10-40 mL  10-40 mL Intracatheter Daily Adriel Meléndez MD   10 mL at 03/22/25 1004        Current Facility-Administered Medications   Medication Dose Route Frequency Provider Last Rate Last Admin    lactated ringers infusion   Intravenous Continuous Kam Hutchins PA-C 50 mL/hr at 03/22/25 1235 New Bag at 03/22/25 1235    propofol (DIPRIVAN) infusion  5-75 mcg/kg/min Intravenous Continuous Annette Taylor PA-C   Stopped at 03/20/25 1107    And    Medication Instruction   Does not apply Continuous PRN  Annette Taylor PA-C        sodium chloride 0.9 % infusion   Intravenous Continuous Adriel Meléndez MD 10 mL/hr at 03/22/25 1110 New Bag at 03/22/25 1110       Current Facility-Administered Medications   Medication Dose Route Frequency Provider Last Rate Last Admin    acetaminophen (TYLENOL) tablet 650 mg  650 mg Oral Q6H PRN Annette Taylor PA-C        Or    acetaminophen (TYLENOL) oral liquid 650 mg  650 mg Per NG tube Q6H PRN Annette Taylor PA-C   650 mg at 03/22/25 0747    albuterol (PROVENTIL) neb solution 2.5 mg  2.5 mg Nebulization Q4H PRN Annette Taylor PA-C        glucose gel 15-30 g  15-30 g Oral Q15 Min PRN Kam Hutchins PA-C        Or    dextrose 50 % injection 25-50 mL  25-50 mL Intravenous Q15 Min PRN Kam Hutchins PA-C        Or    glucagon injection 1 mg  1 mg Subcutaneous Q15 Min PRN Kam Hutchins PA-C        HYDROmorphone (DILAUDID) injection 0.2 mg  0.2 mg Intravenous Q2H PRN Annette Taylor PA-C   0.2 mg at 03/21/25 0408    propofol (DIPRIVAN) bolus from bag or syringe pump  10 mg Intravenous Q15 Min PRN Annette Taylor PA-C        And    Medication Instruction   Does not apply Continuous PRN Annette Taylor PA-C        [Held by provider] midazolam (VERSED) injection 4 mg  4 mg Intravenous Q1H PRN Mir Jacobson MD   4 mg at 03/18/25 1924    naloxone (NARCAN) injection 0.2 mg  0.2 mg Intravenous Q2 Min PRN John Ahuja RPH        Or    naloxone (NARCAN) injection 0.4 mg  0.4 mg Intravenous Q2 Min PRN John Ahuja RPH        Or    naloxone (NARCAN) injection 0.2 mg  0.2 mg Intramuscular Q2 Min PRN John Ahuja RPH        Or    naloxone (NARCAN) injection 0.4 mg  0.4 mg Intramuscular Q2 Min PRN John Ahuja RPH        ondansetron (ZOFRAN ODT) ODT tab 4 mg  4 mg Oral Q6H PRN Annette Taylor PA-C        Or    ondansetron (ZOFRAN) injection 4 mg  4 mg Intravenous Q6H PRN Annette Taylor PA-C         polyethylene glycol (MIRALAX) Packet 17 g  17 g Oral Daily PRN Annette Taylor PA-C        senna-docusate (SENOKOT-S/PERICOLACE) 8.6-50 MG per tablet 1 tablet  1 tablet Oral BID PRN Annette Taylor PA-C        Or    senna-docusate (SENOKOT-S/PERICOLACE) 8.6-50 MG per tablet 2 tablet  2 tablet Oral BID PRN Annette Taylor PA-C        sodium chloride (PF) 0.9% PF flush 10-20 mL  10-20 mL Intracatheter q1 min prn Adriel Meléndez MD             Imaging and Other Data:        EEG Video 12-26 hr Unmonitored  EEG Video 12-26 hr Unmonitored Result    VIDEO EEG DATE: 3/20/25  VIDEO EEG LOG: [facility] 25- 231 LT  VIDEO EEG DAY#: 1  VIDEO EEG SOURCE FILE DURATION: 14 Hours and 59 Minutes    PATIENT INFORMATION: Miguel Rivera is a 59 year old male with PMH of   acute on chronic hypoxemic and hypercapnic respiratory failure with Bipap   dependence, COPD, HTN, HLD, Stage III CKD, RV diastolic dysfunction,   Tricuspid Regurgitation, and Type II DM, resented to [facility] ED via EMS   after unwitnessed cardiac arrest. EEG is being done to monitor for   seizures.     MEDICATIONS: Reviewed-See electronic health record for list of medications   administered on the day of this recording  These medications and doses were derived from the medical record at the   time of this procedure.    TECHNICAL SUMMARY: EEG was recorded from 24 MRI COMP. WIRES scalp   electrodes placed according to the 10-20 international system. Additional   electrodes were used for referencing, EKG, and to record from other   cerebral regions as appropriate. Video was continuously recorded and was   reviewed for clinical correlation. Electrodes were attached and both video   and EEG were monitored and annotated by qualified EEG technologists.   Video-EEG was reviewed and report generated by qualified physician.    BACKGROUND ACTIVITY: There was generalized suppression of the background   with minimal electrocerebral activities.  No  electrocerebral activities   greater than 5  V were noted when looking at 2  V/mm sensitivity.    ACTIVATION PROCEDURE: Was performed at 1402.  The patient withdrew to   painful stimulation to his lower extremities.  EEG was not reactive.    INTERICTAL EPILEPTIFORM DISCHARGES: None    ICTAL: No clinical events or electrographic seizures were recorded. Video   was reviewed intermittently by EEG technologist and physician for clinical   seizures.     IMPRESSION OF VIDEO EEG DAY # 1: This video EEG is abnormal due to the   presence of generalized suppression of the background consistent with   severe diffuse nonspecific encephalopathy.  Sedative medications could in   part contribute to this finding, however the degree of suppression is   beyond medication effect.  No epileptiform discharges or electrographic   seizures were recorded.  Clinical correlation is advised.    Jacy Monsalve MD  EPILEPSY STAFF   EEG Video 2-12 HRS Ummonitored  EEG Video 2-12 HRS Ummonitored Result    VIDEO EEG DATE: 3/21/2025  VIDEO EEG LOG: [facility]  VIDEO EEG DAY#: 2  VIDEO EEG SOURCE FILE DURATION: 7 Hours and 19 Minutes    PATIENT INFORMATION: Miguel Rivera is a 59 year old male with PMH of   acute on chronic hypoxemic and hypercapnic respiratory failure with Bipap   dependence, COPD, HTN, HLD, Stage III CKD, RV diastolic dysfunction,   Tricuspid Regurgitation, and Type II DM, resented to [facility] ED via EMS   after unwitnessed cardiac arrest. EEG is being done to monitor for   seizures.     MEDICATIONS: Reviewed-See electronic health record for list of medications   administered on the day of this recording  These medications and doses were derived from the medical record at the   time of this procedure.    TECHNICAL SUMMARY: EEG was recorded from 24 MRI COMP. WIRES scalp   electrodes placed according to the 10-20 international system. Additional   electrodes were used for referencing, EKG, and to record from other    cerebral regions as appropriate. Video was continuously recorded and was   reviewed for clinical correlation. Electrodes were attached and both video   and EEG were monitored and annotated by qualified EEG technologists.   Video-EEG was reviewed and report generated by qualified physician.    BACKGROUND ACTIVITY: There was generalized suppression of the background   with minimal electrocerebral activities.  No electrocerebral activities   greater than 5  V were noted when looking at sensitivity of 2  V/mm.    ACTIVATION PROCEDURE: Was performed prior to seizure protocol at 814.  The   patient withdrew to painful stimulation to his lower extremities.  EEG was   not reactive.    INTERICTAL EPILEPTIFORM DISCHARGES: None    ICTAL: No clinical events or electrographic seizures were recorded. Video   was reviewed intermittently by EEG technologist and physician for clinical   seizures.     IMPRESSION OF VIDEO EEG DAY # 2: This video EEG is abnormal due to the   presence of generalized suppression of the background consistent with   severe diffuse nonspecific encephalopathy.  Sedative drips could in part   contribute to this finding, however the degree of suppression is beyond   medication effect.  No epileptiform discharges or electrographic seizures   were recorded.  Clinical correlation is advised.    Jacy Monsalve MD  EPILEPSY STAFF   EEG Video 2-12 HRS Ummonitored  EEG Video 2-12 HRS Ummonitored Result    VIDEO EEG DATE: 3/19/2025  VIDEO EEG LOG: [facility]  5HR. VIDEO  VIDEO EEG SOURCE FILE DURATION: 5Hours and 30Minutes    PATIENT INFORMATION: Miguel Rivera is a 59 year old male with PMH of   acute on chronic hypoxemic and hypercapnic respiratory failure with Bipap   dependence, COPD, HTN, HLD, Stage III CKD, RV diastolic dysfunction,   Tricuspid Regurgitation, and Type II DM, resented to [facility] ED via EMS   after unwitnessed cardiac arrest. EEG is being done to evaluate for   seizures.      MEDICATIONS: Reviewed-See electronic health record for list of medications   administered on the day of this recording  These medications and doses were derived from the medical record at the   time of this procedure.    TECHNICAL SUMMARY: EEG was recorded from 24 wires scalp electrodes placed   according to the 10-20 international system. Additional electrodes were   used for referencing, EKG, and to record from other cerebral regions as   appropriate. Video was continuously recorded and was reviewed for clinical   correlation. Electrodes were attached and both video and EEG were   monitored and annotated by qualified EEG technologists. Video-EEG was   reviewed and report generated by qualified physician.    FINDINGS:  no well-organized posterior dominant rhythm sleep-wake cycling   was observed.  Background consisted of diffuse 2 to 5 Hz theta delta   activities.  There were high amplitude sharply contoured delta activities   in the frontal regions.  Frontally predominant generalized sharp waves   were seen intermittently which at times came periodically at 1 Hz.   There   were also sharp transients over the right frontotemporal head region.    ICTAL: No clinical events or electrographic seizures were recorded. Video   was reviewed intermittently by EEG technologist and physician for clinical   seizures.     IMPRESSION OF VIDEO EEG: This video electroencephalogram is abnormal due   to the presence of diffuse delta predominant slowing with intermittent   generalized sharp waves which at times came as periodic discharges (GPDs).    Findings are consistent with severe diffuse nonspecific encephalopathy   and cortical irritability.  No electrographic seizures were recorded.    Clinical correlation is advised.     Jacy Monsalve MD  EPILEPSY STAFF   MR Brain w/o & w Contrast  Narrative: EXAM: MR BRAIN W/O and W CONTRAST  LOCATION: [facility]  DATE: 3/21/2025    INDICATION: Anoxic  brain injury  COMPARISON: Head CT 3/18/2025  CONTRAST: 9 ml Gadavist  TECHNIQUE: Routine multiplanar multisequence head MRI without and with intravenous contrast.    FINDINGS:  INTRACRANIAL CONTENTS: Diffuse abnormal restricted diffusion involving the supratentorial cortex. Additional involvement in the basal ganglia. There is associated FLAIR signal hyperintensity. There is gyral swelling resulting in some sulcal effacement.   No shift of midline structures or sellar tonsillar herniation. No intracranial hemorrhage or pathologic enhancement.    SELLA: No abnormality accounting for technique.    OSSEOUS STRUCTURES/SOFT TISSUES: Normal marrow signal. The major intracranial vascular flow voids are maintained.     ORBITS: No abnormality accounting for technique.     SINUSES/MASTOIDS: Diffuse mucosal thickening. Complete/near complete opacification of the mastoid air cells bilaterally. No apparent mass in the posterior nasopharynx or skull base.   Impression: IMPRESSION:  1.  Evidence of diffuse anoxic brain injury.  2.  No intracranial hemorrhage.             Assessment and Plan:     Summary:  Miguel Rivera is a 59 year old male admitted on 3/18/2025 for cardiac arrest.  I have personally reviewed the daily labs, imaging studies, cultures and discussed the case with referring physician and consulting physicians. My assessment and plan as follows:    Neurology and Psychiatry:  Assessment:   Current Facility-Administered Medications   Medication Dose Route Frequency Provider Last Rate Last Admin    lactated ringers infusion   Intravenous Continuous Her-Kam George PA-C 50 mL/hr at 03/22/25 1235 New Bag at 03/22/25 1235    propofol (DIPRIVAN) infusion  5-75 mcg/kg/min Intravenous Continuous Annette Taylor PA-C   Stopped at 03/20/25 1107    And    Medication Instruction   Does not apply Continuous PRN Annette Taylor PA-C        sodium chloride 0.9 % infusion   Intravenous Continuous Adriel Meléndez  MD Lucía 10 mL/hr at 03/22/25 1110 New Bag at 03/22/25 1110       Severe anoxic encephalopathy  Prolonged cardiac arrest before ROSC (30+ minutes) with unclear time down (25 or less minutes) before EMS arrival  CO2 immeasurably high upon arrival leaving   Post arrest 72 hr MRI brain imaging shows diffuse axonal brain injury.   Poor neuro exam- with VBG this morning show improved PvCo2 with appropriate pH, has been afebrile, does have concern for PNA but is being appropriately addressed   cEEG previously showed minimal brain activity and no seizures   Overall- poor prognosis with no expectation for meaningful neurologic recovery.     Plan   Prolonged family meeting today with patient's multiple siblings both in person and over the phone with NCC and pt's RN present. Pt has no MADELEINE/POA and no next of kin default decision making falls to siblings. Obviously, ideal situation would be for all family members to come to an agreement on next steps. After Dr. Whyte spent an extended period of time explaining to the family the imaging and EEG and clinical exam findings and the poor prognosis of expected continued coma or vegetative state, and I mentioned the relative stability of the other systems, majority of family members wanted to give some more time to see if any improvement could occur over the next week (one brother was okay with moving towards comfort already). In the meantime, did discuss change of code status to DNR while continuing other current cares, but one sibling was adamant that this not be done as he found it the equivalent to making him comfort care if he were to arrest again so no change to the code was made.   For now, continue full code, plan will be to assess for any improvement over the next 7 days while family continues to talk amongst themselves to form consensus (the ideal scenario)  In case an unideal scenerio must be pursued, we will clarify with the legal department if [place] state law  allows decision making by one or majority of siblings instead of full consensus     Cardiovascular and Hemodynamics:  Assessment:  Temp:  [98.1  F (36.7  C)-99  F (37.2  C)] 98.4  F (36.9  C)  Pulse:  [] 79  Resp:  [12-22] 22  BP: (135-146)/() 146/85  FiO2 (%):  [40 %-80 %] 50 %  SpO2:  [92 %-97 %] 97 %       Intake/Output Summary (Last 24 hours) at 3/22/2025 2202  Last data filed at 3/22/2025 2000  Gross per 24 hour   Intake 1618.5 ml   Output 2210 ml   Net -591.5 ml         Echo result w/o MOPS: Interpretation Summary The left ventricle is normal in size.Left ventricular systolic function is normal.The visual ejection fraction is 55-60%.Normal left ventricular wall motionThe right ventricle is normal in structure, function and size.Mild valvular aortic stenosis. The mean AoV pressure gradient is 13mmHg.The rhythm was atrial fibrillation.There is no comparison study available.      Vasoplegic/cardiogenic shock- resolved  Asystolic arrest.  From history respiratory as a primary source seems most likely.  EKG not consistent with STEMI.  Troponin minimally elevated  Medical problems include chronic hypoxic/hypercapneic respiratory failure and NIV usage, hypertension, dyslipidemia and atrial fibrillation and previous dx of diastolic heart failure/PH and tricuspid regurgitation (the latter two issues appear improved on recent TTE) .  Blood pressures now trending higher  Ectopy that has resolved.     Plan  Monitor hemodynamics closely.  Initiate antihypertensive if necessary.  There is no indication for evaluation for coronary artery disease    Pulmonary/ID:  Assessment        FiO2 (%): 50 %, Resp: 22, Vent Mode: CMV/AC, Resp Rate (Set): 10 breaths/min, Tidal Volume (Set, mL): 450 mL, PEEP (cm H2O): 8 cmH2O, Resp Rate (Set): 10 breaths/min, Tidal Volume (Set, mL): 450 mL, PEEP (cm H2O): 8 cmH2O Arterial Blood Gas  Recent Labs   Lab 03/22/25  1043 03/20/25  1302 03/20/25  0441 03/19/25  1115 03/19/25  0028  03/18/25 2202 03/18/25 2142 03/18/25  1821   PH  --   --   --   --   --  7.39  --  7.30*   PCO2  --   --   --   --   --  80*  --  87*   PO2  --   --   --   --   --  70*  --  57*   HCO3  --   --   --   --   --  48*  --  42*   O2PER 60 60 60 60   < > 70   < >  --     < > = values in this interval not displayed.     pH Venous   Date Value Ref Range Status   03/22/2025 7.41 7.32 - 7.43 Final   03/20/2025 7.35 7.32 - 7.43 Final   03/20/2025 7.39 7.32 - 7.43 Final     pCO2 Venous   Date Value Ref Range Status   03/22/2025 54 (H) 40 - 50 mm Hg Final   03/20/2025 85 (HH) 40 - 50 mm Hg Final   03/20/2025 83 (HH) 40 - 50 mm Hg Final       Acute on chronic hypoxic and hypercapnic respiratory failure.  pH 7.3 pCO2 90 suggesting profound chronic hypercapnia.  On ventilator today, 3/20, evidence of severe airflow obstruction.  Improved somewhat after decreasing respiratory rate and increasing tidal volume to 9 mL/kg. Pressure support was not helpful.  Aspiration pneumonia: Low-grade fever, leukocytosis, purulent secretions, Streptococcus growing in sputum.  On antibiotics  Plan:  Narrow antibiotics from Zosyn to ceftriaxone   Continue bronchodilators and high steroids  Continue current ventilator settings  Vap precautions: HOB 30, chlorhexidine  5.   Check daily VBGs- PvCO2 today significantly improved into compensated level  6.   Maintain current vent settings- weaning FiO2 and PEEP as able    GI and Nutrition:  Assessment:  AST   Date Value Ref Range Status   03/20/2025 60 (H) 0 - 45 U/L Final   03/19/2025   Final     Comment:     Unsatisfactory specimen - hemolyzed     03/18/2025 71 (H) 0 - 45 U/L Final     ALT   Date Value Ref Range Status   03/20/2025 30 0 - 70 U/L Final   03/19/2025 40 0 - 70 U/L Final   03/18/2025 48 0 - 70 U/L Final     Bilirubin Total   Date Value Ref Range Status   03/20/2025 0.2 <=1.2 mg/dL Final   03/19/2025 0.2 <=1.2 mg/dL Final   03/18/2025 0.2 <=1.2 mg/dL Final     Protein Total   Date Value Ref  "Range Status   03/20/2025 6.1 (L) 6.4 - 8.3 g/dL Final   03/19/2025 6.1 (L) 6.4 - 8.3 g/dL Final   03/18/2025 6.9 6.4 - 8.3 g/dL Final     Albumin   Date Value Ref Range Status   03/20/2025 2.9 (L) 3.5 - 5.2 g/dL Final   03/19/2025 2.9 (L) 3.5 - 5.2 g/dL Final   03/18/2025 3.4 (L) 3.5 - 5.2 g/dL Final     Alkaline Phosphatase   Date Value Ref Range Status   03/20/2025 91 40 - 150 U/L Final   03/19/2025 107 40 - 150 U/L Final   03/18/2025 134 40 - 150 U/L Final     Transaminitis likely secondary to arrest given elevated lactic acid.  Improving.   Nutritional Plan: Orders Placed This Encounter      NPO for Medical/Clinical Reasons Except for: No Exceptions      Plan:   Monitor   Stress ulcer: PPI  Consult to RD for tube feed initiation after family meeting since no plan for withdrawal of care    Renal, Fluid and Electrolytes:  Assessment:  BMP  Recent Labs   Lab 03/22/25  0427 03/22/25  0116 03/21/25  0413 03/20/25  0441 03/19/25  0520 03/18/25  2142     --   --  143 143 142   POTASSIUM 4.5  4.4  --  4.6 3.7 4.3 4.3   CHLORIDE 99  --   --  95* 94* 92*   CO2 29  --   --  45* 44* 40*   ANIONGAP 9  --   --  3* 5* 10   BUN 25.4*  --   --  30.5* 25.6* 19.6   CR 0.69 0.71  --  0.76 0.69 0.67   GFRESTIMATED >90 >90  --  >90 >90 >90   LOLA 8.4*  --   --  8.2* 8.2* 8.9   MAG 1.8  --  2.2 2.1 2.2 1.8   PHOS 1.9*  --  2.9 2.4* 3.1 4.0     Lactic Acid:  Lactic Acid   Date Value Ref Range Status   03/20/2025 0.8 0.7 - 2.0 mmol/L Final   03/19/2025 0.7 0.7 - 2.0 mmol/L Final   03/19/2025 2.4 (H) 0.7 - 2.0 mmol/L Final     No results found for: \"KETONE\"      Intake/Output Summary (Last 24 hours) at 3/22/2025 2219  Last data filed at 3/22/2025 2000  Gross per 24 hour   Intake 1618.5 ml   Output 2210 ml   Net -591.5 ml     02/20 2300 - 03/22 2259  In: 6516.51 [I.V.:5461.51]  Out: 8530 [Urine:8480]  Net: -2013.49        Respiratory acidosis with metabolic compensation.  Urine output adequate  Hypophosphatemia that was replaced on " 3/20.  Repeat pending today.  Magnesium and potassium normal    Plan:   Maintenance IV fluid to continue until TFs started   Replace electrolytes if abnormal  monitor I/O, urine output, kidney function and electrolytes and treat as needed. Avoid nephrotoxic agents.    Infectious Disease:  Assessment:        7-Day Micro Results       Collected Updated Procedure Result Status      03/19/2025 0426 03/21/2025 0756 Respiratory Aerobic Bacterial Culture with Gram Stain [[id number]]   (Abnormal)   Aspirate from Endotracheal    Final result Component Value   Culture 4+ Normal Jorge   Gram Stain Result >25 PMNs/low power field    4+ Gram positive bacilli resembling diphtheroids    1+ Gram positive cocci               03/18/2025 2207 03/22/2025 1418 Respiratory Aerobic Bacterial Culture with Gram Stain [[id number]]    (Abnormal)   Sputum from Endotracheal    Final result Component Value   Culture 4+ Streptococcus pseudopneumoniae    Identification obtained by MALDI-TOF mass spectrometry research use only database. Test characteristics determined and verified by the [facility].    4+ Normal jorge   Gram Stain Result >25 PMNs/low power field    3+ Gram positive bacilli resembling diphtheroids    1+ Gram positive cocci        Susceptibility        Streptococcus pseudopneumoniae      WALLY      Ceftriaxone (meningitis) <=0.25 ug/mL Susceptible      Ceftriaxone (non-meningitis) <=0.25 ug/mL Susceptible      Clindamycin <=0.12 ug/mL Susceptible  [1]       Erythromycin >2 ug/mL Resistant      Levofloxacin <=1 ug/mL Susceptible      Penicillin (meningitis) <=0.06 ug/mL Susceptible      Penicillin (non-meningitis) <=0.06 ug/mL Susceptible      Penicillin(oral) <=0.06 ug/mL Susceptible      Vancomycin 0.5 ug/mL Susceptible                   [1]  This isolate DOES NOT demonstrate inducible clindamycin resistance in vitro. Clindamycin is susceptible and could be used when indicated, however, erythromycin  "is resistant and should not be used.                    03/18/2025 1728 03/21/2025 2317 Blood Culture Peripheral Blood [[id number]]   Peripheral Blood    Preliminary result Component Value   Culture No growth after 3 days  [P]                03/18/2025 1728 03/21/2025 2005 Blood Culture Peripheral Blood [[id number]]   Peripheral Blood    Preliminary result Component Value   Culture No growth after 3 days  [P]                      Aspiration pneumonia: Low-grade fever, leukocytosis, purulent secretions, Streptococcus growing in sputum.  On antibiotics    Plan:  Narrow antibiotics from Zosyn to ceftriaxone         Hematology and Oncology:  Assessment:  Recent Labs   Lab 03/22/25  0116 03/20/25  0441 03/19/25  0520 03/18/25  2142   WBC 10.7 12.2* 12.9* 10.9   RBC 3.05* 3.03* 3.20* 3.68*   HGB 8.7* 8.6* 9.0* 10.4*   HCT 29.5* 30.0* 31.2* 36.9*   MCV 97 99 98 100   MCH 28.5 28.4 28.1 28.3   MCHC 29.5* 28.7* 28.8* 28.2*   RDW 14.6 14.6 14.4 14.4     257 240 225 256     No lab results found in last 7 days.  No results found for: \"AAUFH\"      1.  Moderate anemia.  No signs of bleeding  2. Leukocytosis secondary to stress and likely aspiration pneumonia- improving.     Plan:  Treat with antibiotics   anticoagulation:  Enoxaparin at prophylactic dose    Endocrinology and Rheumatology:  Assessment:  Stress hyperglycemia  Recent Labs   Lab 03/22/25  1607 03/22/25  1248 03/22/25  0733 03/22/25  0428 03/22/25  0427 03/22/25  0115   * 90 81 79 89 96      Plan:  Insulin orders in place.  Has not required  Steroids    Skin and Musculoskeletal:  Assessment:  Standard skin cares          Family update: Conference today (see neuro section)        Clinically Significant Risk Factors     # Obesity: Estimated body mass index is 39.11 kg/m  as calculated from the following:    Height as of this encounter: 1.575 m (5' 2\").    Weight as of this encounter: 97 kg (213 lb 13.5 oz).         [facility] ICU Rounding " checklist    Assessment of central venous catheter access Central access present and needed   Assessment of urinary catheter use Blount present and needed.  Indication:  obstruction and hemodynamic instability   DVT prophylaxis Subcutaneous heparin product (heparin or LMWH)         [facility]  CRITICAL CARE STAFF NOTE    I am managing these acute and ongoing critical issues resulting in critical condition that impairs one or more vital organ systems, incur a high probability of imminent or life-threatening deterioration in the patient's condition and providing frequent personal assessment and manipulation of medications and life support equipment.     1. Out of hospital cardiac arrest  2. Acute on chronic hypoxic and hypercapnic respiratory failure   3. Diffuse anoxic devastating brain injury   4. Aspiration pneumonia     ICU Interventions: ventilator management, parenteral medications necessitating continuous monitoring including vasoactive medications, medication for heart rhythm control, insulin infusion, and/or sedative/opiates , and interpretation of lab values, cardiac output, cxr, pulse oximetry, blood gases, and/or information/data stored in computers    The plan was formulated in conjunction with pharmacy, ICU nurses, and respiratory therapist. I have evaluated all laboratory values and imaging studies for the past 24 hours. I have reviewed all the consults that have been ordered and are active for this patient.      Critical Care Time: 120 min.  I spent this time (excluding procedures) personally providing and directing critical care services at the bedside and on the critical care unit.      Hilton Schwartz MD   of Medicine, Pulmonary/CC  March 22, 2025

## 2025-03-22 NOTE — PROGRESS NOTES
[facility] ICU RESPIRATORY NOTE        Date of Admission: 3/18/2025    Date of Intubation (most recent): 03/18/2025    Reason for Mechanical Ventilation: Cardiac Arrest    Number of Days on Mechanical Ventilation: 5    Met Criteria for Spontaneous Breathing Trial: No    PEEP 8    Bite Block: Yes; please explain: Patient has white/green bite block in on right side of mouth. Patient bites down on tougue.     Significant Events Today: Nothing noted.     ABG Results:   Recent Labs   Lab 03/22/25  1043 03/20/25  1302 03/20/25  0441 03/19/25  1115 03/19/25  0028 03/18/25  2202 03/18/25  2142 03/18/25  1821   PH  --   --   --   --   --  7.39  --  7.30*   PCO2  --   --   --   --   --  80*  --  87*   PO2  --   --   --   --   --  70*  --  57*   HCO3  --   --   --   --   --  48*  --  42*   O2PER 60 60 60 60   < > 70   < >  --     < > = values in this interval not displayed.         Current Vent Settings: FiO2 (%): 50 %, Resp: 22, Vent Mode: CMV/AC, Resp Rate (Set): 10 breaths/min, Tidal Volume (Set, mL): 450 mL, PEEP (cm H2O): 8 cmH2O, Resp Rate (Set): 10 breaths/min, Tidal Volume (Set, mL): 450 mL, PEEP (cm H2O): 8 cmH2O    Skin Assessment: Skin integrity is good. Bite block in place.     Plan: Continue full ventilator support. Assess readiness to wean tomorrow.     Tangela Abbasi RT on 3/22/2025 at 6:17 PM

## 2025-03-22 NOTE — PLAN OF CARE
"  Problem: Adult Inpatient Plan of Care  Goal: Plan of Care Review  Description: The Plan of Care Review/Shift note should be completed every shift.  The Outcome Evaluation is a brief statement about your assessment that the patient is improving, declining, or no change.  This information will be displayed automatically on your shift  note.  Outcome: Not Progressing  Flowsheets (Taken 3/21/2025 1944)  Outcome Evaluation: Pt remains off sedations, no changes to neuro status. Care conference today at beside with some family, no decisions made at this time as family needed to speak with rest of family. Brother Martínez called later to say that we should withdraw care and two of his sisters would be at bedside at 1700. Four family members arrived after 1800 and called another brother asking to speak with the doctors and questioning decision to withdraw care. PA spoke at length with family about injury and prognosis. Plan for another care confernece tomorrow at 1400 to speak with more family.  Plan of Care Reviewed With:   patient   sibling  Overall Patient Progress: no change  Goal: Patient-Specific Goal (Individualized)  Description: You can add care plan individualizations to a care plan. Examples of Individualization might be:  \"Parent requests to be called daily at 9am for status\", \"I have a hard time hearing out of my right ear\", or \"Do not touch me to wake me up as it startles  me\".  Outcome: Not Progressing  Goal: Absence of Hospital-Acquired Illness or Injury  Outcome: Not Progressing  Intervention: Identify and Manage Fall Risk  Recent Flowsheet Documentation  Taken 3/21/2025 1800 by Doris Dumont, RN  Safety Promotion/Fall Prevention:   room door open   room near nurse's station   clutter free environment maintained   lighting adjusted   room organization consistent   safety round/check completed  Taken 3/21/2025 1600 by Doris Dumont, RN  Safety Promotion/Fall Prevention:   room door open   room near " nurse's station   clutter free environment maintained   lighting adjusted   room organization consistent   safety round/check completed  Taken 3/21/2025 1400 by Doris Dumont RN  Safety Promotion/Fall Prevention:   room door open   room near nurse's station   clutter free environment maintained   lighting adjusted   room organization consistent   safety round/check completed  Taken 3/21/2025 1200 by Doris Dumont RN  Safety Promotion/Fall Prevention:   room door open   room near nurse's station   clutter free environment maintained   lighting adjusted   room organization consistent   safety round/check completed  Taken 3/21/2025 1000 by Doris Dumont RN  Safety Promotion/Fall Prevention:   room door open   room near nurse's station   clutter free environment maintained   lighting adjusted   room organization consistent   safety round/check completed  Intervention: Prevent Skin Injury  Recent Flowsheet Documentation  Taken 3/21/2025 1800 by Doris Dumont RN  Body Position:   turned   upper extremity elevated   lower extremity elevated  Taken 3/21/2025 1600 by Doris Dumont RN  Body Position:   turned   upper extremity elevated   lower extremity elevated  Skin Protection:   adhesive use limited   silicone foam dressing in place   skin to skin areas padded   tubing/devices free from skin contact  Taken 3/21/2025 1400 by Doris Dumont RN  Body Position:   turned   upper extremity elevated   lower extremity elevated  Taken 3/21/2025 1200 by Doris Dumont RN  Body Position:   turned   upper extremity elevated   lower extremity elevated  Skin Protection:   adhesive use limited   silicone foam dressing in place   skin to skin areas padded   tubing/devices free from skin contact  Taken 3/21/2025 1000 by Doris Dumont RN  Body Position:   turned   upper extremity elevated   lower extremity elevated  Skin Protection:   adhesive use limited   silicone foam dressing in place   skin  to skin areas padded   tubing/devices free from skin contact  Intervention: Prevent and Manage VTE (Venous Thromboembolism) Risk  Recent Flowsheet Documentation  Taken 3/21/2025 1600 by Doris Dumont RN  VTE Prevention/Management: SCDs on (sequential compression devices)  Taken 3/21/2025 1200 by Doris Dumont RN  VTE Prevention/Management: SCDs on (sequential compression devices)  Taken 3/21/2025 1000 by Doris Dumont RN  VTE Prevention/Management: SCDs on (sequential compression devices)  Goal: Optimal Comfort and Wellbeing  Outcome: Not Progressing  Intervention: Monitor Pain and Promote Comfort  Recent Flowsheet Documentation  Taken 3/21/2025 1600 by Doris Dumont RN  Pain Management Interventions:   rest   repositioned   quiet environment facilitated   pillow support provided  Taken 3/21/2025 1200 by Doris Dumont RN  Pain Management Interventions:   rest   repositioned   quiet environment facilitated   pillow support provided  Intervention: Provide Person-Centered Care  Recent Flowsheet Documentation  Taken 3/21/2025 1600 by Doris Dumont RN  Trust Relationship/Rapport:   care explained   reassurance provided  Taken 3/21/2025 1200 by Doris Dumont RN  Trust Relationship/Rapport:   care explained   reassurance provided  Taken 3/21/2025 1000 by Doris Dumont RN  Trust Relationship/Rapport:   care explained   reassurance provided  Goal: Readiness for Transition of Care  Outcome: Not Progressing     Problem: Mechanical Ventilation Invasive  Goal: Effective Communication  Outcome: Not Progressing  Intervention: Ensure Effective Communication  Recent Flowsheet Documentation  Taken 3/21/2025 1600 by Doris Dumont RN  Family/Support System Care:   caregiver stress acknowledged   family care conference arranged  Trust Relationship/Rapport:   care explained   reassurance provided  Taken 3/21/2025 1200 by Doris Dumont RN  Family/Support System Care:   caregiver  stress acknowledged   family care conference arranged  Trust Relationship/Rapport:   care explained   reassurance provided  Taken 3/21/2025 1000 by Doris Dumont RN  Family/Support System Care:   caregiver stress acknowledged   family care conference arranged  Trust Relationship/Rapport:   care explained   reassurance provided  Goal: Optimal Device Function  Outcome: Not Progressing  Intervention: Optimize Device Care and Function  Recent Flowsheet Documentation  Taken 3/21/2025 1800 by Doris Dumont RN  Oral Care:   swabbed with antiseptic solution   teeth brushed   tongue brushed  Taken 3/21/2025 1600 by Doris Dumont RN  Oral Care:   swabbed with antiseptic solution   teeth brushed   tongue brushed  Taken 3/21/2025 1400 by Doris Dumont RN  Oral Care:   swabbed with antiseptic solution   teeth brushed   tongue brushed  Taken 3/21/2025 1200 by Doris Dumont RN  Oral Care:   swabbed with antiseptic solution   teeth brushed   tongue brushed  Taken 3/21/2025 1000 by Doris Dumont RN  Oral Care:   swabbed with antiseptic solution   teeth brushed   tongue brushed  Goal: Mechanical Ventilation Liberation  Outcome: Not Progressing  Intervention: Promote Extubation and Mechanical Ventilation Liberation  Recent Flowsheet Documentation  Taken 3/21/2025 1800 by Doris Dumont RN  Medication Review/Management:   medications reviewed   high-risk medications identified  Taken 3/21/2025 1600 by Doris Dumont RN  Medication Review/Management:   medications reviewed   high-risk medications identified  Environmental Support:   calm environment promoted   environmental consistency promoted  Taken 3/21/2025 1400 by Doris Dumont RN  Medication Review/Management:   medications reviewed   high-risk medications identified  Taken 3/21/2025 1200 by Doris Dumont RN  Medication Review/Management:   medications reviewed   high-risk medications identified  Environmental Support:    calm environment promoted   environmental consistency promoted  Taken 3/21/2025 1000 by Doris Dumont RN  Medication Review/Management:   medications reviewed   high-risk medications identified  Environmental Support:   calm environment promoted   environmental consistency promoted  Goal: Optimal Nutrition Delivery  Outcome: Not Progressing  Intervention: Optimize Nutrition Delivery  Recent Flowsheet Documentation  Taken 3/21/2025 1600 by Doris Dumont RN  Nutrition Support Management: weight trending reviewed  Taken 3/21/2025 1200 by Doris Dumont RN  Nutrition Support Management: weight trending reviewed  Taken 3/21/2025 1000 by Doris Dumont RN  Nutrition Support Management: weight trending reviewed  Goal: Absence of Device-Related Skin and Tissue Injury  Outcome: Not Progressing  Intervention: Maintain Skin and Tissue Health  Recent Flowsheet Documentation  Taken 3/21/2025 1600 by Doris Dumont RN  Device Skin Pressure Protection:   adhesive use limited   tubing/devices free from skin contact  Taken 3/21/2025 1200 by Doris Dumont RN  Device Skin Pressure Protection:   adhesive use limited   tubing/devices free from skin contact  Taken 3/21/2025 1000 by Doris Dumont RN  Device Skin Pressure Protection:   adhesive use limited   tubing/devices free from skin contact  Goal: Absence of Ventilator-Induced Lung Injury  Outcome: Not Progressing  Intervention: Prevent Ventilator-Associated Pneumonia  Recent Flowsheet Documentation  Taken 3/21/2025 1800 by Doris Dumont RN  Oral Care:   swabbed with antiseptic solution   teeth brushed   tongue brushed  Taken 3/21/2025 1600 by Doris Dumont RN  VAP Prevention Bundle:   HOB elevation maintained   oral care regularly provided   readiness to extubate assessed   stress ulcer prophylaxis provided   vent circuit breaks minimized   VTE prophylaxis provided  Oral Care:   swabbed with antiseptic solution   teeth brushed    tongue brushed  Head of Bed (HOB) Positioning: HOB at 30 degrees  VAP Prevention Contraindications: no/minimum sedation required  VAP Prevention Measures: completed  Taken 3/21/2025 1400 by Doris Dumont RN  Oral Care:   swabbed with antiseptic solution   teeth brushed   tongue brushed  Taken 3/21/2025 1200 by Doris Dumont RN  VAP Prevention Bundle:   HOB elevation maintained   oral care regularly provided   readiness to extubate assessed   stress ulcer prophylaxis provided   vent circuit breaks minimized   VTE prophylaxis provided  Oral Care:   swabbed with antiseptic solution   teeth brushed   tongue brushed  Head of Bed (HOB) Positioning: HOB at 30 degrees  VAP Prevention Contraindications: no/minimum sedation required  VAP Prevention Measures: completed  Taken 3/21/2025 1000 by Doris Dumont RN  VAP Prevention Bundle:   HOB elevation maintained   oral care regularly provided   readiness to extubate assessed   stress ulcer prophylaxis provided   vent circuit breaks minimized   VTE prophylaxis provided  Oral Care:   swabbed with antiseptic solution   teeth brushed   tongue brushed  Head of Bed (HOB) Positioning: HOB at 30 degrees  VAP Prevention Contraindications: no/minimum sedation required  VAP Prevention Measures: completed     Problem: Comorbidity Management  Goal: Maintenance of Heart Failure Symptom Control  Outcome: Not Progressing  Intervention: Maintain Heart Failure Management  Recent Flowsheet Documentation  Taken 3/21/2025 1800 by Doris Dumont RN  Medication Review/Management:   medications reviewed   high-risk medications identified  Taken 3/21/2025 1600 by Doris Dumont RN  Medication Review/Management:   medications reviewed   high-risk medications identified  Taken 3/21/2025 1400 by Doris Dumont, RN  Medication Review/Management:   medications reviewed   high-risk medications identified  Taken 3/21/2025 1200 by Doris Dumont RN  Medication  Review/Management:   medications reviewed   high-risk medications identified  Taken 3/21/2025 1000 by Doris Dumont RN  Medication Review/Management:   medications reviewed   high-risk medications identified     Problem: Risk for Delirium  Goal: Optimal Coping  Outcome: Not Progressing  Intervention: Optimize Psychosocial Adjustment to Delirium  Recent Flowsheet Documentation  Taken 3/21/2025 1600 by Doris Dumont RN  Family/Support System Care:   caregiver stress acknowledged   family care conference arranged  Taken 3/21/2025 1200 by Doris Dumont RN  Family/Support System Care:   caregiver stress acknowledged   family care conference arranged  Taken 3/21/2025 1000 by Doris Dumont RN  Family/Support System Care:   caregiver stress acknowledged   family care conference arranged  Goal: Improved Behavioral Control  Outcome: Not Progressing  Intervention: Prevent and Manage Agitation  Recent Flowsheet Documentation  Taken 3/21/2025 1600 by Doris Dumont RN  Environment Familiarity/Consistency: daily routine followed  Taken 3/21/2025 1200 by Doris Dumont RN  Environment Familiarity/Consistency: daily routine followed  Taken 3/21/2025 1000 by Doris Dumont RN  Environment Familiarity/Consistency: daily routine followed  Intervention: Minimize Safety Risk  Recent Flowsheet Documentation  Taken 3/21/2025 1800 by Doris Dumont RN  Enhanced Safety Measures: room near unit station  Taken 3/21/2025 1600 by Doris Dumont RN  Enhanced Safety Measures: room near unit station  Trust Relationship/Rapport:   care explained   reassurance provided  Taken 3/21/2025 1400 by Doris Dumont RN  Enhanced Safety Measures: room near unit station  Taken 3/21/2025 1200 by Doris Dumont RN  Enhanced Safety Measures: room near unit station  Trust Relationship/Rapport:   care explained   reassurance provided  Taken 3/21/2025 1000 by Doris Dumont RN  Enhanced Safety  Measures: room near unit station  Trust Relationship/Rapport:   care explained   reassurance provided  Goal: Improved Attention and Thought Clarity  Outcome: Not Progressing  Intervention: Maximize Cognitive Function  Recent Flowsheet Documentation  Taken 3/21/2025 1600 by Doris Dumont RN  Sensory Stimulation Regulation:   television on   quiet environment promoted   care clustered  Reorientation Measures:   clock in view   reorientation provided  Taken 3/21/2025 1200 by Doris Dumont, RN  Sensory Stimulation Regulation:   television on   quiet environment promoted   care clustered  Reorientation Measures:   clock in view   reorientation provided  Taken 3/21/2025 1000 by Doris Dumont RN  Sensory Stimulation Regulation:   television on   quiet environment promoted   care clustered  Reorientation Measures:   clock in view   reorientation provided  Goal: Improved Sleep  Outcome: Not Progressing     Problem: Skin Injury Risk Increased  Goal: Skin Health and Integrity  Outcome: Not Progressing  Intervention: Plan: Nurse Driven Intervention: Positioning  Recent Flowsheet Documentation  Taken 3/21/2025 1600 by Doris Dumont RN  Plan: Positioning Interventions:   REPOSITION Left/Right (No supine) q2h   Use wedge positioners   OFF-LOAD HEELS with pillows  Taken 3/21/2025 1200 by Doris Dumont RN  Plan: Positioning Interventions:   REPOSITION Left/Right (No supine) q2h   Use wedge positioners   OFF-LOAD HEELS with pillows  Taken 3/21/2025 1000 by Doris Dumont RN  Plan: Positioning Interventions:   REPOSITION Left/Right (No supine) q2h   Use wedge positioners   OFF-LOAD HEELS with pillows  Intervention: Plan: Nurse Driven Intervention: Moisture Management  Recent Flowsheet Documentation  Taken 3/21/2025 1000 by Doris Dumont RN  Moisture Interventions:   Incontinence pad   Urinary collection device  Intervention: Plan: Nurse Driven Intervention: Friction and Shear  Recent Flowsheet  Documentation  Taken 3/21/2025 1000 by Doris Dumont RN  Friction/Shear Interventions:   HOB 30 degrees or less   Silicone foam sacral dressing   Assistive lifting device (portable/ceiling lift, etc.)   Lateral transfer device (hovermat, etc.)   Repositioning device (TAP system, etc.)  Intervention: Optimize Skin Protection  Recent Flowsheet Documentation  Taken 3/21/2025 1600 by Doris Dumont RN  Skin Protection:   adhesive use limited   silicone foam dressing in place   skin to skin areas padded   tubing/devices free from skin contact  Activity Management: bedrest  Head of Bed (HOB) Positioning: HOB at 30 degrees  Taken 3/21/2025 1200 by Doris Dumont RN  Skin Protection:   adhesive use limited   silicone foam dressing in place   skin to skin areas padded   tubing/devices free from skin contact  Activity Management: bedrest  Head of Bed (HOB) Positioning: HOB at 30 degrees  Taken 3/21/2025 1000 by Doris Dumont RN  Skin Protection:   adhesive use limited   silicone foam dressing in place   skin to skin areas padded   tubing/devices free from skin contact  Activity Management: bedrest  Head of Bed (HOB) Positioning: HOB at 30 degrees   Goal Outcome Evaluation:      Plan of Care Reviewed With: patient, sibling    Overall Patient Progress: no changeOverall Patient Progress: no change    Outcome Evaluation: Pt remains off sedations, no changes to neuro status. Care conference today at beside with some family, no decisions made at this time as family needed to speak with rest of family. Brother Martínez called later to say that we should withdraw care and two of his sisters would be at bedside at 1700. Four family members arrived after 1800 and called another brother asking to speak with the doctors and questioning decision to withdraw care. PA spoke at length with family about injury and prognosis. Plan for another care confernece tomorrow at 1400 to speak with more family.

## 2025-03-22 NOTE — PROGRESS NOTES
[facility]    Vascular Neurology Progress Note    Interval History     Miguel was seen and examined this AM, neuroexam grossly unchanged since yesterday. Family meeting today at 2 PM.       Hospital Course     Chief complaint: Cardiac Arrest    59 year old male with a PMH of acute on chronic hypoxemic and hypercapnic repsiratory failure, BiPAP dependence, COPD, CKD 3, HTN, HLD, RV diastolic dysfunction, tricuspid regurgitation, DM2 who presented to the ED for out-of-hospital PEA cardiac arrest s/p ROSC. On admission, CT head showed some early changes concerning for anoxic brain injury with subtle loss of gray-white differentiation.  cEEG with suppressed activity but was negative for any seizures.  On exam off sedation he is comatose, he does have preserved brainstem reflexes but with poor motor response.  Family requesting more time after we had explained the poor long-term neuro prognosis.       Assessment and Plan       Anoxic brain injury   Out-of-hospital PEA cardiac arrest s/p ROSC     59 year old male with a PMH of acute on chronic hypoxemic and hypercapnic repsiratory failure, BiPAP dependence, COPD, CKD 3, HTN, HLD, RV diastolic dysfunction, tricuspid regurgitation, DM2 who presented to the ED for out-of-hospital PEA cardiac arrest s/p ROSC. On admission, CT head showed some early changes concerning for anoxic brain injury with subtle loss of gray-white differentiation.  cEEG with suppressed activity but was negative for any seizures.  MRI brain revealed widespread changes of anoxic brain injury. On exam, off sedation he is comatose, he does have some preserved brainstem reflexes but with poor motor response.  Discussed with family about poor long-term neurological prognosis especially given exam and neurodiagnostic findings of catastrophic anoxic brain injury.  Family requested more time to discuss and give down more time before they could further clarify goals of care.  "      Recommendations               - Minimize sedation               -Euthermic, Euglycemia, normonatremia              -Continue supportive cares until next family meeting    Patient Follow-up    - in 6 weeks with general neurology or stroke VERONICA ([phone])    We will continue to follow.       The Stroke Staff is Dr. Whyte.    Lance Simon MD  Vascular Neurology Fellow    To page me or covering stroke neurology team member, click here: [facility]  Choose \"On Call\" tab at top, then select \"NEUROLOGY/ALL SITES\" from middle drop-down box, press Enter, then look for \"stroke\" or \"telestroke\" for your site.    Physical Examination     Temp: 98.4  F (36.9  C) Temp src: Esophageal BP: (!) 146/85 Pulse: 79   Resp: 22 SpO2: 97 % O2 Device: Mechanical Ventilator      Neurologic  Mental Status:   Comatose, opening eyes to vigorous sternal rubs, does not track, does not open eyes, does not follow commands.  Cranial Nerves:  visual fields not intact to threat, PERRL, oculocephalic reflex negative, no cough, corneal reflex intact.  Motor: Mild withdrawal in bilateral upper and lower extremities.(Likely reflexive)  Reflexes: Unequivocal  Coordination: Unable to test  Station/Gait:  deferred      Imaging/Labs   (Bolded imaging and labs new and/or personally reviewed or re-reviewed by me today)    MRI/Head CT CT Head   IMPRESSION:  1.  Some loss of gray-white matter differentiation involving the bilateral basal ganglia, thalami, and potentially the insular cortex compared to the previous exam. Hypoxic/ischemic injury in these locations is possible. The extent of ischemic injuries   would be more accurately evaluated by MRI.  2.  No hemorrhage, extra-axial collection, or mass effect.    MRI brain  IMPRESSION:  1.  Evidence of diffuse anoxic brain injury.  2.  No intracranial hemorrhage.   Intracranial Vasculature Not done   Cervical Vasculature Not done     Echocardiogram The left ventricle is normal in size.  Left " ventricular systolic function is normal.  The visual ejection fraction is 55-60%.  Normal left ventricular wall motion  The right ventricle is normal in structure, function and size.  Mild valvular aortic stenosis. The mean AoV pressure gradient is 13mmHg.  The rhythm was atrial fibrillation.  There is no comparison study available.   EKG/Telemetry AF     LDL  No lab value available in past 30 days   A1C  No lab value available in past 90 days   Troponin No lab value available in past 48 hrs     Other labs reviewed by me today:

## 2025-03-22 NOTE — PROGRESS NOTES
"Signed out from day time re possible transition of goal of care to comfort.  Pt's brother, Martínez called earlier in shift to conform decision made during daytime regarding family wishes to transition to comfort cares once additional  family arrived to visit.     Approx 6:30, patient's  2 sisters, one of whom  (Yasmin) presented to care conference, Leigh, pt's  sister in law and niece present. Patient's brother, ran,  present on phone and voiced concern about withdrawal/transition to comfort cares if patient was not braindead and asked for brain death examination.     Patient's sister Leigh asked out details of conference that was held earlier also.  I explained that Miguel was not brain death and this was not stated in prior notes or conference that they were not present for earlier today.  I explained that patient had a very severe anoxic brain injury  but still had preserved brain stem reflexes and this is why he could still overbreathe the ventilator and \"cough and  move legs \" as family described. Leigh asked about how long Miguel had been without oxygen,     Family asked to speak with Dr Simon from neurology and Dr Lamb who had previously conducted care conference today. I explained that neither were present after hours but would contact on call neurology team about concerns. Neurology paged by team and informed of family concerns.      I reiterated that all siblings should be present at next care conference discuss concerns and desires for Miguel. Ran expressed he did not want to come to hospital as he could not bear to see Miguel in this state. Leigh reports she will reschedule her [place] trip. I offered reservation of our conference room on the unit to have conference. Family conference was set up for Saturday 2pm 3/228/25  to further update and clarify family concerns.       Addendum to add time for billing 3/25/25    20 mins at bedside for family update.     Kam Hutchins PA-C    "

## 2025-03-22 NOTE — PLAN OF CARE
Goal Outcome Evaluation:      Plan of Care Reviewed With: patient, sibling    Overall Patient Progress: no changeOverall Patient Progress: no change     [id number]    Pt remains unresponsive on zero sedation - no change in neuro status. Remains vented at 80%, peep 10.     Brother Martínez was updated over the phone regarding pt status. Plan for care conference with all family members to discuss POC.      Problem: Adult Inpatient Plan of Care  Goal: Plan of Care Review  Description: The Plan of Care Review/Shift note should be completed every shift.  The Outcome Evaluation is a brief statement about your assessment that the patient is improving, declining, or no change.  This information will be displayed automatically on your shift  note.  Outcome: Not Progressing  Flowsheets (Taken 3/22/2025 0637)  Plan of Care Reviewed With:   patient   sibling  Overall Patient Progress: no change  Goal: Optimal Comfort and Wellbeing  Outcome: Not Progressing  Intervention: Monitor Pain and Promote Comfort  Recent Flowsheet Documentation  Taken 3/22/2025 0400 by Natalie Interiano RN  Pain Management Interventions:   repositioned   rest  Taken 3/22/2025 0000 by Natalie Interiano RN  Pain Management Interventions:   repositioned   rest  Intervention: Provide Person-Centered Care  Recent Flowsheet Documentation  Taken 3/22/2025 0600 by Natalie Interiano RN  Trust Relationship/Rapport:   questions answered   questions encouraged   empathic listening provided  Taken 3/22/2025 0400 by Natalie Interiano RN  Trust Relationship/Rapport:   care explained   reassurance provided  Taken 3/22/2025 0000 by Natalie Interiano RN  Trust Relationship/Rapport:   care explained   reassurance provided     Problem: Mechanical Ventilation Invasive  Goal: Effective Communication  Outcome: Not Progressing  Intervention: Ensure Effective Communication  Recent Flowsheet Documentation  Taken 3/22/2025 0600 by Natalie Interiano RN  Family/Support System Care: support provided  Trust  Relationship/Rapport:   questions answered   questions encouraged   empathic listening provided  Taken 3/22/2025 0400 by Natalie Interiano RN  Trust Relationship/Rapport:   care explained   reassurance provided  Taken 3/22/2025 0000 by Natalie Interiano RN  Trust Relationship/Rapport:   care explained   reassurance provided  Goal: Optimal Device Function  Outcome: Not Progressing  Intervention: Optimize Device Care and Function  Recent Flowsheet Documentation  Taken 3/22/2025 0600 by Natalie Interiano RN  Oral Care:   swabbed with antiseptic solution   suction provided  Taken 3/22/2025 0400 by Natalie Interiano RN  Airway/Ventilation Management:   airway patency maintained   calming measures promoted   humidification applied   oxygen therapy provided   positive pressure ventilation provided   pulmonary hygiene promoted  Oral Care:   swabbed with antiseptic solution   suction provided  Taken 3/22/2025 0200 by Natalie Interiano RN  Oral Care:   swabbed with antiseptic solution   suction provided  Taken 3/22/2025 0000 by Natalie Interiano RN  Airway/Ventilation Management:   airway patency maintained   calming measures promoted   humidification applied   oxygen therapy provided   positive pressure ventilation provided   pulmonary hygiene promoted  Oral Care:   swabbed with antiseptic solution   suction provided  Goal: Mechanical Ventilation Liberation  Outcome: Not Progressing  Intervention: Promote Extubation and Mechanical Ventilation Liberation  Recent Flowsheet Documentation  Taken 3/22/2025 0400 by Natalie Interiano RN  Sleep/Rest Enhancement:   comfort measures   relaxation techniques promoted  Medication Review/Management:   medications reviewed   high-risk medications identified  Environmental Support: calm environment promoted  Taken 3/22/2025 0000 by Natalie Interiano RN  Sleep/Rest Enhancement:   comfort measures   relaxation techniques promoted  Medication Review/Management:   medications reviewed   high-risk medications  identified  Environmental Support: calm environment promoted  Goal: Optimal Nutrition Delivery  Outcome: Not Progressing  Intervention: Optimize Nutrition Delivery  Recent Flowsheet Documentation  Taken 3/22/2025 0400 by Natalie Interiano RN  Nutrition Support Management: weight trending reviewed  Taken 3/22/2025 0000 by Natalie Interiano RN  Nutrition Support Management: weight trending reviewed  Goal: Absence of Device-Related Skin and Tissue Injury  Outcome: Not Progressing  Intervention: Maintain Skin and Tissue Health  Recent Flowsheet Documentation  Taken 3/22/2025 0400 by Natalie Interiano RN  Device Skin Pressure Protection:   absorbent pad utilized/changed   adhesive use limited   positioning supports utilized   pressure points protected   skin-to-device areas padded   tubing/devices free from skin contact  Taken 3/22/2025 0000 by Natalie Interiano RN  Device Skin Pressure Protection:   absorbent pad utilized/changed   adhesive use limited   positioning supports utilized   pressure points protected   skin-to-device areas padded   tubing/devices free from skin contact  Goal: Absence of Ventilator-Induced Lung Injury  Outcome: Not Progressing  Intervention: Facilitate Lung-Protection Measures  Recent Flowsheet Documentation  Taken 3/22/2025 0400 by Natalie Interiano RN  Lung Protection Measures:   optimal PEEP applied   ventilator waveforms monitored  Taken 3/22/2025 0000 by Natalie Interiano RN  Lung Protection Measures:   optimal PEEP applied   ventilator waveforms monitored  Intervention: Prevent Ventilator-Associated Pneumonia  Recent Flowsheet Documentation  Taken 3/22/2025 0600 by Natalie Interiano RN  Oral Care:   swabbed with antiseptic solution   suction provided  Head of Bed (HOB) Positioning: HOB at 30 degrees  Taken 3/22/2025 0400 by Natalie Interiano RN  Oral Care:   swabbed with antiseptic solution   suction provided  Head of Bed (HOB) Positioning: HOB at 30 degrees  Taken 3/22/2025 0200 by Natalie Interiano RN  Oral Care:    swabbed with antiseptic solution   suction provided  Head of Bed (HOB) Positioning: HOB at 30 degrees  Taken 3/22/2025 0000 by Natalie Interiano RN  Oral Care:   swabbed with antiseptic solution   suction provided  Head of Bed (HOB) Positioning: HOB at 30 degrees

## 2025-03-23 LAB
ALBUMIN SERPL BCG-MCNC: 3.2 G/DL (ref 3.5–5.2)
ALP SERPL-CCNC: 65 U/L (ref 40–150)
ALT SERPL W P-5'-P-CCNC: 17 U/L (ref 0–70)
ANION GAP SERPL CALCULATED.3IONS-SCNC: 8 MMOL/L (ref 7–15)
AST SERPL W P-5'-P-CCNC: 67 U/L (ref 0–45)
BACTERIA BLD CULT: NO GROWTH
BACTERIA BLD CULT: NO GROWTH
BASE EXCESS BLDV CALC-SCNC: 6.4 MMOL/L (ref -3–3)
BILIRUB SERPL-MCNC: 0.2 MG/DL
BUN SERPL-MCNC: 20 MG/DL (ref 8–23)
CALCIUM SERPL-MCNC: 8.3 MG/DL (ref 8.8–10.4)
CHLORIDE SERPL-SCNC: 97 MMOL/L (ref 98–107)
CREAT SERPL-MCNC: 0.64 MG/DL (ref 0.67–1.17)
EGFRCR SERPLBLD CKD-EPI 2021: >90 ML/MIN/1.73M2
ERYTHROCYTE [DISTWIDTH] IN BLOOD BY AUTOMATED COUNT: 14.5 % (ref 10–15)
GLUCOSE BLDC GLUCOMTR-MCNC: 113 MG/DL (ref 70–99)
GLUCOSE BLDC GLUCOMTR-MCNC: 123 MG/DL (ref 70–99)
GLUCOSE BLDC GLUCOMTR-MCNC: 81 MG/DL (ref 70–99)
GLUCOSE BLDC GLUCOMTR-MCNC: 84 MG/DL (ref 70–99)
GLUCOSE BLDC GLUCOMTR-MCNC: 90 MG/DL (ref 70–99)
GLUCOSE BLDC GLUCOMTR-MCNC: 94 MG/DL (ref 70–99)
GLUCOSE SERPL-MCNC: 85 MG/DL (ref 70–99)
HCO3 BLDV-SCNC: 32 MMOL/L (ref 21–28)
HCO3 SERPL-SCNC: 29 MMOL/L (ref 22–29)
HCT VFR BLD AUTO: 30.3 % (ref 40–53)
HGB BLD-MCNC: 9.2 G/DL (ref 13.3–17.7)
MAGNESIUM SERPL-MCNC: 1.7 MG/DL (ref 1.7–2.3)
MCH RBC QN AUTO: 28.2 PG (ref 26.5–33)
MCHC RBC AUTO-ENTMCNC: 30.4 G/DL (ref 31.5–36.5)
MCV RBC AUTO: 93 FL (ref 78–100)
O2/TOTAL GAS SETTING VFR VENT: 50 %
OXYHGB MFR BLDV: 85 % (ref 70–75)
PCO2 BLDV: 49 MM HG (ref 40–50)
PH BLDV: 7.42 [PH] (ref 7.32–7.43)
PHOSPHATE SERPL-MCNC: 3.1 MG/DL (ref 2.5–4.5)
PLATELET # BLD AUTO: 257 10E3/UL (ref 150–450)
PO2 BLDV: 53 MM HG (ref 25–47)
POTASSIUM SERPL-SCNC: 4.5 MMOL/L (ref 3.4–5.3)
PROT SERPL-MCNC: 6.3 G/DL (ref 6.4–8.3)
RBC # BLD AUTO: 3.26 10E6/UL (ref 4.4–5.9)
SAO2 % BLDV: 86.8 % (ref 70–75)
SODIUM SERPL-SCNC: 134 MMOL/L (ref 135–145)
WBC # BLD AUTO: 9.3 10E3/UL (ref 4–11)

## 2025-03-23 PROCEDURE — 83735 ASSAY OF MAGNESIUM: CPT | Performed by: SURGERY

## 2025-03-23 PROCEDURE — 85027 COMPLETE CBC AUTOMATED: CPT | Performed by: INTERNAL MEDICINE

## 2025-03-23 PROCEDURE — 250N000009 HC RX 250: Performed by: INTERNAL MEDICINE

## 2025-03-23 PROCEDURE — 250N000011 HC RX IP 250 OP 636: Mod: JZ | Performed by: INTERNAL MEDICINE

## 2025-03-23 PROCEDURE — 250N000011 HC RX IP 250 OP 636: Performed by: INTERNAL MEDICINE

## 2025-03-23 PROCEDURE — 258N000003 HC RX IP 258 OP 636: Performed by: INTERNAL MEDICINE

## 2025-03-23 PROCEDURE — 94640 AIRWAY INHALATION TREATMENT: CPT | Mod: 76

## 2025-03-23 PROCEDURE — 258N000003 HC RX IP 258 OP 636: Performed by: PHYSICIAN ASSISTANT

## 2025-03-23 PROCEDURE — 94640 AIRWAY INHALATION TREATMENT: CPT

## 2025-03-23 PROCEDURE — 999N000009 HC STATISTIC AIRWAY CARE

## 2025-03-23 PROCEDURE — 80053 COMPREHEN METABOLIC PANEL: CPT | Performed by: INTERNAL MEDICINE

## 2025-03-23 PROCEDURE — 99291 CRITICAL CARE FIRST HOUR: CPT | Mod: GC | Performed by: PSYCHIATRY & NEUROLOGY

## 2025-03-23 PROCEDURE — 250N000013 HC RX MED GY IP 250 OP 250 PS 637

## 2025-03-23 PROCEDURE — 250N000011 HC RX IP 250 OP 636

## 2025-03-23 PROCEDURE — 999N000157 HC STATISTIC RCP TIME EA 10 MIN

## 2025-03-23 PROCEDURE — 200N000001 HC R&B ICU

## 2025-03-23 PROCEDURE — 82805 BLOOD GASES W/O2 SATURATION: CPT | Performed by: INTERNAL MEDICINE

## 2025-03-23 PROCEDURE — 84100 ASSAY OF PHOSPHORUS: CPT | Performed by: INTERNAL MEDICINE

## 2025-03-23 PROCEDURE — 94003 VENT MGMT INPAT SUBQ DAY: CPT

## 2025-03-23 PROCEDURE — 99291 CRITICAL CARE FIRST HOUR: CPT | Performed by: INTERNAL MEDICINE

## 2025-03-23 RX ORDER — ACETAMINOPHEN 325 MG/1
650 TABLET ORAL EVERY 6 HOURS PRN
Status: DISCONTINUED | OUTPATIENT
Start: 2025-03-23 | End: 2025-03-29 | Stop reason: HOSPADM

## 2025-03-23 RX ORDER — ACETYLCYSTEINE 200 MG/ML
2 SOLUTION ORAL; RESPIRATORY (INHALATION) EVERY 6 HOURS
Status: DISCONTINUED | OUTPATIENT
Start: 2025-03-23 | End: 2025-03-26

## 2025-03-23 RX ORDER — ACETAMINOPHEN 325 MG/10.15ML
650 LIQUID ORAL EVERY 6 HOURS PRN
Status: DISCONTINUED | OUTPATIENT
Start: 2025-03-23 | End: 2025-03-29 | Stop reason: HOSPADM

## 2025-03-23 RX ORDER — IPRATROPIUM BROMIDE AND ALBUTEROL SULFATE 2.5; .5 MG/3ML; MG/3ML
3 SOLUTION RESPIRATORY (INHALATION)
Status: DISCONTINUED | OUTPATIENT
Start: 2025-03-23 | End: 2025-03-26

## 2025-03-23 RX ORDER — MAGNESIUM SULFATE HEPTAHYDRATE 40 MG/ML
2 INJECTION, SOLUTION INTRAVENOUS ONCE
Status: COMPLETED | OUTPATIENT
Start: 2025-03-23 | End: 2025-03-23

## 2025-03-23 RX ADMIN — ACETYLCYSTEINE 2 ML: 200 SOLUTION ORAL; RESPIRATORY (INHALATION) at 20:38

## 2025-03-23 RX ADMIN — IPRATROPIUM BROMIDE AND ALBUTEROL SULFATE 3 ML: .5; 3 SOLUTION RESPIRATORY (INHALATION) at 20:38

## 2025-03-23 RX ADMIN — CEFTRIAXONE SODIUM 2 G: 2 INJECTION, POWDER, FOR SOLUTION INTRAMUSCULAR; INTRAVENOUS at 16:01

## 2025-03-23 RX ADMIN — MAGNESIUM SULFATE HEPTAHYDRATE 2 G: 40 INJECTION, SOLUTION INTRAVENOUS at 05:47

## 2025-03-23 RX ADMIN — IPRATROPIUM BROMIDE AND ALBUTEROL SULFATE 3 ML: .5; 3 SOLUTION RESPIRATORY (INHALATION) at 07:47

## 2025-03-23 RX ADMIN — IPRATROPIUM BROMIDE AND ALBUTEROL SULFATE 3 ML: .5; 3 SOLUTION RESPIRATORY (INHALATION) at 10:42

## 2025-03-23 RX ADMIN — PANTOPRAZOLE SODIUM 40 MG: 40 INJECTION, POWDER, FOR SOLUTION INTRAVENOUS at 07:32

## 2025-03-23 RX ADMIN — CHLORHEXIDINE GLUCONATE 15 ML: 1.2 RINSE ORAL at 07:32

## 2025-03-23 RX ADMIN — ENOXAPARIN SODIUM 40 MG: 40 INJECTION SUBCUTANEOUS at 13:00

## 2025-03-23 RX ADMIN — SODIUM CHLORIDE: 0.9 INJECTION, SOLUTION INTRAVENOUS at 14:53

## 2025-03-23 RX ADMIN — CHLORHEXIDINE GLUCONATE 15 ML: 1.2 RINSE ORAL at 20:55

## 2025-03-23 RX ADMIN — SODIUM CHLORIDE, SODIUM LACTATE, POTASSIUM CHLORIDE, AND CALCIUM CHLORIDE: .6; .31; .03; .02 INJECTION, SOLUTION INTRAVENOUS at 07:38

## 2025-03-23 RX ADMIN — METHYLPREDNISOLONE SODIUM SUCCINATE 40 MG: 40 INJECTION, POWDER, FOR SOLUTION INTRAMUSCULAR; INTRAVENOUS at 13:00

## 2025-03-23 ASSESSMENT — ACTIVITIES OF DAILY LIVING (ADL)
ADLS_ACUITY_SCORE: 79

## 2025-03-23 NOTE — PROGRESS NOTES
[facility] ICU RESPIRATORY NOTE        Date of Admission: 3/18/2025    Date of Intubation (most recent): 03/18/2025    Reason for Mechanical Ventilation: Cardiac Arrest    Number of Days on Mechanical Ventilation: 6    Met Criteria for Spontaneous Breathing Trial: Yes    Significant events: Tried PSV but not helpful at this time.         ABG Results:   Recent Labs   Lab 03/23/25  0434 03/22/25  1043 03/20/25  1302 03/20/25  0441 03/19/25  0028 03/18/25  2202 03/18/25  2142 03/18/25  1821   PH  --   --   --   --   --  7.39  --  7.30*   PCO2  --   --   --   --   --  80*  --  87*   PO2  --   --   --   --   --  70*  --  57*   HCO3  --   --   --   --   --  48*  --  42*   O2PER 50 60 60 60   < > 70   < >  --     < > = values in this interval not displayed.         Current Vent Settings: FiO2 (%): 90 %, Resp: 21, Vent Mode: CMV/AC, Resp Rate (Set): 10 breaths/min, Tidal Volume (Set, mL): 450 mL, PEEP (cm H2O): 8 cmH2O, Pressure Support (cm H2O): 10 cmH2O, Resp Rate (Set): 10 breaths/min, Tidal Volume (Set, mL): 450 mL, PEEP (cm H2O): 8 cmH2O    Skin Assessment: Skin integrity is good.     Plan: Continue full ventilator support and assess readiness to wean tomorrow.     Tangela Abbasi RT on 3/23/2025 at 6:17 PM

## 2025-03-23 NOTE — PLAN OF CARE
Problem: Adult Inpatient Plan of Care  Goal: Plan of Care Review  Description: The Plan of Care Review/Shift note should be completed every shift.  The Outcome Evaluation is a brief statement about your assessment that the patient is improving, declining, or no change.  This information will be displayed automatically on your shift  note.  Outcome: Not Progressing  Flowsheets (Taken 3/22/2025 2042)  Outcome Evaluation: Neuro checks remain the same, pt only responds minimally to vigirous stimulation. Flexion to pain. Care confernce held again with more family. Family is split on plan going forward. Will make no changes at this time and have another conference in about a week. Vent weaned from 80% to 50% and PEEP decreased to 8 per intensivist. Pt having thick creamy secreations per ETT. Sputum culture + for strep. Remains on antibiotics. VSS.  Plan of Care Reviewed With:   patient   sibling  Overall Patient Progress: no change   Goal Outcome Evaluation:      Plan of Care Reviewed With: patient, sibling    Overall Patient Progress: no changeOverall Patient Progress: no change    Outcome Evaluation: Neuro checks remain the same, pt only responds minimally to vigirous stimulation. Flexion to pain. Care confernce held again with more family. Family is split on plan going forward. Will make no changes at this time and have another conference in about a week. Vent weaned from 80% to 50% and PEEP decreased to 8 per intensivist. Pt having thick creamy secreations per ETT. Sputum culture + for strep. Remains on antibiotics. VSS.

## 2025-03-23 NOTE — PLAN OF CARE
"Neuro: PERRL, unable to open eyes, does not follow commands  CV: Tele SR  Resp: LS coarse, vent at AC 10 rr / 450 vol / 50% / 8 peep  Gtts: LR  GI: small BM, NPO  : Blount with adequate urine output  Skin: see chart  Activity: bedrest  Additional: mag replaced, temp increasing near end of shift to 100.4 F    Problem: Adult Inpatient Plan of Care  Goal: Plan of Care Review  Description: The Plan of Care Review/Shift note should be completed every shift.  The Outcome Evaluation is a brief statement about your assessment that the patient is improving, declining, or no change.  This information will be displayed automatically on your shift  note.  Outcome: Not Progressing  Flowsheets (Taken 3/23/2025 0622)  Overall Patient Progress: no change  Goal: Patient-Specific Goal (Individualized)  Description: You can add care plan individualizations to a care plan. Examples of Individualization might be:  \"Parent requests to be called daily at 9am for status\", \"I have a hard time hearing out of my right ear\", or \"Do not touch me to wake me up as it startles  me\".  Outcome: Not Progressing  Goal: Absence of Hospital-Acquired Illness or Injury  Outcome: Not Progressing  Intervention: Identify and Manage Fall Risk  Recent Flowsheet Documentation  Taken 3/23/2025 0400 by Jase Baker, RN  Safety Promotion/Fall Prevention:   clutter free environment maintained   room door open   room near nurse's station   room organization consistent   safety round/check completed  Taken 3/23/2025 0000 by Jase Baker, RN  Safety Promotion/Fall Prevention:   clutter free environment maintained   room door open   room near nurse's station   room organization consistent   safety round/check completed  Taken 3/22/2025 2000 by Jase Baker, RN  Safety Promotion/Fall Prevention:   clutter free environment maintained   room door open   room near nurse's station   room organization consistent   safety round/check completed  Intervention: " Prevent Skin Injury  Recent Flowsheet Documentation  Taken 3/23/2025 0600 by Jase Baker RN  Body Position:   turned   heels elevated   upper extremity elevated  Taken 3/23/2025 0400 by Jase Baker RN  Body Position:   turned   heels elevated   upper extremity elevated  Taken 3/23/2025 0200 by Jase Baker RN  Body Position:   turned   heels elevated   upper extremity elevated  Taken 3/23/2025 0000 by Jase Baker RN  Body Position:   turned   heels elevated   upper extremity elevated  Taken 3/22/2025 2200 by Jase Baker RN  Body Position:   turned   heels elevated   upper extremity elevated  Taken 3/22/2025 2000 by Jase Baker RN  Body Position:   turned   heels elevated   upper extremity elevated  Intervention: Prevent and Manage VTE (Venous Thromboembolism) Risk  Recent Flowsheet Documentation  Taken 3/23/2025 0400 by Jase Baker RN  VTE Prevention/Management: SCDs on (sequential compression devices)  Taken 3/23/2025 0000 by Jase Baker RN  VTE Prevention/Management: SCDs on (sequential compression devices)  Taken 3/22/2025 2000 by Jase Baker RN  VTE Prevention/Management: SCDs on (sequential compression devices)  Goal: Optimal Comfort and Wellbeing  Outcome: Not Progressing  Intervention: Provide Person-Centered Care  Recent Flowsheet Documentation  Taken 3/23/2025 0400 by Jase Baker RN  Trust Relationship/Rapport:   care explained   reassurance provided  Taken 3/23/2025 0000 by Jase Baker RN  Trust Relationship/Rapport:   care explained   reassurance provided  Taken 3/22/2025 2000 by Jase Baker RN  Trust Relationship/Rapport:   care explained   reassurance provided  Goal: Readiness for Transition of Care  Outcome: Not Progressing     Problem: Mechanical Ventilation Invasive  Goal: Effective Communication  Outcome: Not Progressing  Intervention: Ensure Effective Communication  Recent Flowsheet Documentation  Taken 3/23/2025 0400 by Olivia  Jase ABDI RN  Trust Relationship/Rapport:   care explained   reassurance provided  Taken 3/23/2025 0000 by Jase Baker RN  Trust Relationship/Rapport:   care explained   reassurance provided  Taken 3/22/2025 2000 by Jase Baker RN  Trust Relationship/Rapport:   care explained   reassurance provided  Goal: Optimal Device Function  Outcome: Not Progressing  Intervention: Optimize Device Care and Function  Recent Flowsheet Documentation  Taken 3/23/2025 0600 by Jase Baker RN  Oral Care:   swabbed with antiseptic solution   suction provided   teeth brushed  Taken 3/23/2025 0400 by Jase Baker RN  Airway/Ventilation Management:   airway patency maintained   calming measures promoted   humidification applied   oxygen therapy provided   positive pressure ventilation provided   pulmonary hygiene promoted  Oral Care:   swabbed with antiseptic solution   suction provided   teeth brushed  Taken 3/23/2025 0200 by Jase Baker RN  Oral Care:   swabbed with antiseptic solution   suction provided   teeth brushed  Taken 3/23/2025 0000 by Jase Baker RN  Airway/Ventilation Management:   airway patency maintained   calming measures promoted   humidification applied   oxygen therapy provided   positive pressure ventilation provided   pulmonary hygiene promoted  Oral Care:   swabbed with antiseptic solution   suction provided  Taken 3/22/2025 2200 by Jase Baker RN  Oral Care:   oral rinse provided   suction provided   swabbed with antiseptic solution   teeth brushed  Taken 3/22/2025 2000 by Jase Baker RN  Airway/Ventilation Management:   airway patency maintained   calming measures promoted   humidification applied   oxygen therapy provided   positive pressure ventilation provided   pulmonary hygiene promoted  Oral Care:   swabbed with antiseptic solution   suction provided  Goal: Mechanical Ventilation Liberation  Outcome: Not Progressing  Intervention: Promote Extubation and  Mechanical Ventilation Liberation  Recent Flowsheet Documentation  Taken 3/23/2025 0400 by Jase Baker RN  Sleep/Rest Enhancement:   comfort measures   relaxation techniques promoted  Medication Review/Management:   medications reviewed   high-risk medications identified  Environmental Support: calm environment promoted  Taken 3/23/2025 0000 by Jase Baker RN  Sleep/Rest Enhancement:   comfort measures   relaxation techniques promoted  Medication Review/Management:   medications reviewed   high-risk medications identified  Environmental Support: calm environment promoted  Taken 3/22/2025 2000 by Jase Baker RN  Sleep/Rest Enhancement:   comfort measures   relaxation techniques promoted  Medication Review/Management:   medications reviewed   high-risk medications identified  Environmental Support: calm environment promoted  Goal: Optimal Nutrition Delivery  Outcome: Not Progressing  Goal: Absence of Device-Related Skin and Tissue Injury  Outcome: Not Progressing  Goal: Absence of Ventilator-Induced Lung Injury  Outcome: Not Progressing  Intervention: Facilitate Lung-Protection Measures  Recent Flowsheet Documentation  Taken 3/23/2025 0400 by Jase Baker RN  Lung Protection Measures:   optimal PEEP applied   ventilator waveforms monitored  Taken 3/23/2025 0000 by Jase Baker RN  Lung Protection Measures:   optimal PEEP applied   ventilator waveforms monitored  Taken 3/22/2025 2000 by Jase Baker RN  Lung Protection Measures:   optimal PEEP applied   ventilator waveforms monitored  Intervention: Prevent Ventilator-Associated Pneumonia  Recent Flowsheet Documentation  Taken 3/23/2025 0600 by Jase Baker RN  Oral Care:   swabbed with antiseptic solution   suction provided   teeth brushed  Head of Bed (HOB) Positioning: HOB at 30 degrees  Taken 3/23/2025 0400 by Jase Baker RN  VAP Prevention Bundle:   HOB elevation maintained   oral care regularly provided   readiness to  extubate assessed   stress ulcer prophylaxis provided   vent circuit breaks minimized   VTE prophylaxis provided  Oral Care:   swabbed with antiseptic solution   suction provided   teeth brushed  Head of Bed (HOB) Positioning: HOB at 30 degrees  VAP Prevention Contraindications: no/minimum sedation required  VAP Prevention Measures: completed  Taken 3/23/2025 0200 by Jase Baker RN  Oral Care:   swabbed with antiseptic solution   suction provided   teeth brushed  Head of Bed (HOB) Positioning: HOB at 30 degrees  Taken 3/23/2025 0000 by Jase Baker RN  VAP Prevention Bundle:   HOB elevation maintained   oral care regularly provided   readiness to extubate assessed   stress ulcer prophylaxis provided   vent circuit breaks minimized   VTE prophylaxis provided  Oral Care:   swabbed with antiseptic solution   suction provided  Head of Bed (HOB) Positioning: HOB at 30 degrees  VAP Prevention Contraindications: no/minimum sedation required  VAP Prevention Measures: completed  Taken 3/22/2025 2200 by Jase Baker RN  Oral Care:   oral rinse provided   suction provided   swabbed with antiseptic solution   teeth brushed  Head of Bed (HOB) Positioning: HOB at 30 degrees  Taken 3/22/2025 2000 by Jase Baker RN  VAP Prevention Bundle:   HOB elevation maintained   oral care regularly provided   readiness to extubate assessed   stress ulcer prophylaxis provided   vent circuit breaks minimized   VTE prophylaxis provided  Oral Care:   swabbed with antiseptic solution   suction provided  Head of Bed (HOB) Positioning: HOB at 30 degrees  VAP Prevention Contraindications: no/minimum sedation required  VAP Prevention Measures: completed     Problem: Comorbidity Management  Goal: Maintenance of Heart Failure Symptom Control  Outcome: Not Progressing  Intervention: Maintain Heart Failure Management  Recent Flowsheet Documentation  Taken 3/23/2025 0400 by Jase Baker RN  Medication Review/Management:    medications reviewed   high-risk medications identified  Taken 3/23/2025 0000 by Jase Baker RN  Medication Review/Management:   medications reviewed   high-risk medications identified  Taken 3/22/2025 2000 by Jase Baker RN  Medication Review/Management:   medications reviewed   high-risk medications identified     Problem: Risk for Delirium  Goal: Optimal Coping  Outcome: Not Progressing  Intervention: Optimize Psychosocial Adjustment to Delirium  Recent Flowsheet Documentation  Taken 3/23/2025 0400 by Jase Baker RN  Supportive Measures: relaxation techniques promoted  Taken 3/23/2025 0000 by Jase Baker RN  Supportive Measures: relaxation techniques promoted  Taken 3/22/2025 2000 by Jase Baker RN  Supportive Measures: relaxation techniques promoted  Goal: Improved Behavioral Control  Outcome: Not Progressing  Intervention: Prevent and Manage Agitation  Recent Flowsheet Documentation  Taken 3/23/2025 0400 by Jase Baker RN  Environment Familiarity/Consistency: daily routine followed  Taken 3/23/2025 0000 by Jase Baker RN  Environment Familiarity/Consistency: daily routine followed  Taken 3/22/2025 2000 by Jase Baker RN  Environment Familiarity/Consistency: daily routine followed  Intervention: Minimize Safety Risk  Recent Flowsheet Documentation  Taken 3/23/2025 0400 by Jase Baker RN  Enhanced Safety Measures:   review medications for side effects with activity   room near unit station  Trust Relationship/Rapport:   care explained   reassurance provided  Taken 3/23/2025 0000 by Jase Baker RN  Enhanced Safety Measures:   review medications for side effects with activity   room near unit station  Trust Relationship/Rapport:   care explained   reassurance provided  Taken 3/22/2025 2000 by Jase Baker RN  Enhanced Safety Measures:   review medications for side effects with activity   room near unit station  Trust Relationship/Rapport:   care  explained   reassurance provided  Goal: Improved Attention and Thought Clarity  Outcome: Not Progressing  Goal: Improved Sleep  Outcome: Not Progressing  Intervention: Promote Sleep  Recent Flowsheet Documentation  Taken 3/23/2025 0400 by Jase Baker RN  Sleep/Rest Enhancement:   comfort measures   relaxation techniques promoted  Taken 3/23/2025 0000 by Jase Baker RN  Sleep/Rest Enhancement:   comfort measures   relaxation techniques promoted  Taken 3/22/2025 2000 by Jase Baker RN  Sleep/Rest Enhancement:   comfort measures   relaxation techniques promoted     Problem: Skin Injury Risk Increased  Goal: Skin Health and Integrity  Outcome: Not Progressing  Intervention: Plan: Nurse Driven Intervention: Moisture Management  Recent Flowsheet Documentation  Taken 3/23/2025 0400 by Jase Baker RN  Moisture Interventions:   Incontinence pad   Urinary collection device  Taken 3/23/2025 0000 by Jase Baker RN  Moisture Interventions:   Incontinence pad   Urinary collection device  Taken 3/22/2025 2343 by Jase Baker RN  Bathing/Skin Care:   bath, complete   dressed/undressed   electrode patches/site rotation   linen changed  Taken 3/22/2025 2000 by Jase Baker RN  Moisture Interventions:   Incontinence pad   Urinary collection device  Intervention: Plan: Nurse Driven Intervention: Friction and Shear  Recent Flowsheet Documentation  Taken 3/23/2025 0400 by Jase Baker RN  Friction/Shear Interventions:   HOB 30 degrees or less   Silicone foam sacral dressing   Lateral transfer device (hovermat, etc.)   Repositioning device (TAP system, etc.)  Taken 3/23/2025 0000 by Jase Baker RN  Friction/Shear Interventions:   HOB 30 degrees or less   Silicone foam sacral dressing   Lateral transfer device (hovermat, etc.)   Repositioning device (TAP system, etc.)  Taken 3/22/2025 2000 by Jase Baker RN  Friction/Shear Interventions:   HOB 30 degrees or less   Silicone foam  sacral dressing   Lateral transfer device (hovermat, etc.)   Repositioning device (TAP system, etc.)  Intervention: Optimize Skin Protection  Recent Flowsheet Documentation  Taken 3/23/2025 0600 by Jase Baker RN  Head of Bed (HOB) Positioning: HOB at 30 degrees  Taken 3/23/2025 0400 by Jase Baker, RN  Activity Management: bedrest  Head of Bed (HOB) Positioning: HOB at 30 degrees  Taken 3/23/2025 0200 by Jase Baker RN  Head of Bed (HOB) Positioning: HOB at 30 degrees  Taken 3/23/2025 0000 by Jase Baker, RN  Activity Management: bedrest  Head of Bed (HOB) Positioning: HOB at 30 degrees  Taken 3/22/2025 2200 by Jase Baker RN  Head of Bed (HOB) Positioning: HOB at 30 degrees  Taken 3/22/2025 2000 by Jase Baker RN  Activity Management: bedrest  Head of Bed (HOB) Positioning: HOB at 30 degrees   Goal Outcome Evaluation:           Overall Patient Progress: no changeOverall Patient Progress: no change

## 2025-03-23 NOTE — PROGRESS NOTES
[facility] ICU RESPIRATORY NOTE        Date of Admission: 3/18/2025    Date of Intubation (most recent): 03/18/2025    Reason for Mechanical Ventilation: Cardiac arrest.    Number of Days on Mechanical Ventilation: 6    Met Criteria for Spontaneous Breathing Trial: No    Reason for No Spontaneous Breathing Trial: None.    Bite Block: No    Significant Events Today: None.    ABG Results:   Recent Labs   Lab 03/23/25  0434 03/22/25  1043 03/20/25  1302 03/20/25  0441 03/19/25  0028 03/18/25  2202 03/18/25  2142 03/18/25  1821   PH  --   --   --   --   --  7.39  --  7.30*   PCO2  --   --   --   --   --  80*  --  87*   PO2  --   --   --   --   --  70*  --  57*   HCO3  --   --   --   --   --  48*  --  42*   O2PER 50 60 60 60   < > 70   < >  --     < > = values in this interval not displayed.         Current Vent Settings: FiO2 (%): 50 %, Resp: 19, Vent Mode: CMV/AC, Resp Rate (Set): 10 breaths/min, Tidal Volume (Set, mL): 450 mL, PEEP (cm H2O): 8 cmH2O, Resp Rate (Set): 10 breaths/min, Tidal Volume (Set, mL): 450 mL, PEEP (cm H2O): 8 cmH2O      Aury Will, RT on 3/23/2025 at 5:37 AM

## 2025-03-23 NOTE — CONSULTS
"CLINICAL NUTRITION SERVICES - ASSESSMENT NOTE    RECOMMENDATIONS FOR MDs/PROVIDERS TO ORDER:  Adjust free water as needed    Registered Dietitian Interventions:  Initiate TF regimen ~  Osmolite 1.5 @ 45 mL/hr   --> Start @ 25 mL/hr and advance by 10 mL q 12 hours to goal rate.     Enteral Provisions: 1080 mL, 1620 kcal, 67 g protein, 822 mL free water, 219 g CHO, and 0 g fiber daily  Free Water Flush: 60 mL q 4 hours for tube patency (360 mL daily)    Total Provisions (TF + FWF) = 1620 kcal (16 kcal/kg), 67 g pro (0.68 g/kg), 1182 mL H2O    Weekly routine labs ordered     Future/Additional Recommendations:  None at this time     REASON FOR ASSESSMENT  Provider order - Registered Dietitian to order TF per Medical Nutrition Therapy Guidelines    INFORMATION OBTAINED  Patient not available for interview due to intubated    NUTRITION HISTORY  Defer. No family present.     CURRENT NUTRITION ORDERS  Diet: NPO day 6    CURRENT INTAKE/TOLERANCE  N/A    LABS  Nutrition-relevant labs:  Reviewed - Na 134 (L)    MEDICATIONS  Nutrition-relevant medications:  MSSI q4h, Protonix, LR 50 mL/hr, NaCl 10-30 mL/hr, propofol off 3/20    ANTHROPOMETRICS  Height: 157.5 cm (5' 2\")  Admission Weight: 103.9 kg (229 lb) (03/18/25 1753)   Most Recent Weight: 97.5 kg (214 lb 15.2 oz) (03/23/25 0413)  IBW: 50.9 kg  BMI: Body mass index is 39.31 kg/m .   Weight History:     Wt Readings from Last 10 Encounters:   03/23/25 97.5 kg (214 lb 15.2 oz)   08/03/18 64.9 kg (143 lb)   03/11/14 74.8 kg (165 lb)     Chart review:   2/10/25: 96.2 kg (212#)  6/4/24: 88.2 kg (194#)  3/23/24: 94.7 kg (208#)    Dosing Weight: 97.5 kg, based on most recent wt    ASSESSED NUTRITION NEEDS  Estimated Energy Needs: 6047-4101 kcals/day (14 - 17 kcals/kg)  Justification: Obese  Estimated Protein Needs: 58-78 grams protein/day (0.6 - 0.8 grams of pro/kg)  Justification: CKD  Estimated Fluid Needs: Per provider pending fluid status    SYSTEM AND PHYSICAL FINDINGS    3/18: " Intubated   3/21: MRI of brain = 1) Evidence of diffuse anoxic brain injury 2) No intracranial hemorrhage.     GI symptoms:  Loose BM x1 & smear x1 yest per I/O flowsheets (0918/9235)  Skin/wounds: 2-3+ BLE edema    WOCN 3/19:   Wound location: buttock, sacrum, gluteal cleft   Wound due to:  none, patient without wound area noted upon woc assessment, previous area of nonblanchable maroon now blanches.   STATUS: initial assessment     MALNUTRITION  % Intake: </= 50% for >/= 5 days (severe)  % Weight Loss: None noted  Subcutaneous Fat Loss: None observed  Muscle Loss: None observed  Fluid Accumulation/Edema: Moderate to severe, 2-4+  Malnutrition Diagnosis: Severe malnutrition in the context of acute illness or injury  Malnutrition Present on Admission: Unable to assess    NUTRITION DIAGNOSIS  Inadequate protein energy intake related to NPO and intubation status as evidenced by NPO day 6, initiation of enteral nutrition support     INTERVENTIONS  Enteral nutrition - Initiate    Type of Feeding Tube: Orogastric  Enteral Frequency:  Continuous  Enteral Regimen: Osmolite 1.5 @ 45 mL/hr  --> when FT ok for use. Start @ 25 mL/hr and advance by 10 mL q 12 hours to goal rate.   Total Enteral Provisions: 1080 mL, 1620 kcal, 67 g protein, 822 mL free water, 219 g CHO, and 0 g fiber daily   Free Water Flush: 60 mL q 4 hours for tube patency (360 mL daily)  Total Free Water Provisions: 1182 mL daily    GOALS  Patient to tolerate EN initiation + electrolytes WNL  EN to reach goal within 48 hours  EN to meet % nutritional needs at goal rate     MONITORING/EVALUATION  Progress toward goals will be monitored and evaluated per policy.    Tabby Briones RD, LD  Clinical Dietitian - [facility]

## 2025-03-23 NOTE — PROGRESS NOTES
[facility]    Vascular Neurology Progress Note    Interval History     Miguel was seen and examined this AM, no acute events overnight.     Hospital Course     Chief complaint: Cardiac Arrest    59 year old male with a PMH of acute on chronic hypoxemic and hypercapnic repsiratory failure, BiPAP dependence, COPD, CKD 3, HTN, HLD, RV diastolic dysfunction, tricuspid regurgitation, DM2 who presented to the ED for out-of-hospital PEA cardiac arrest s/p ROSC. On admission, CT head showed some early changes concerning for anoxic brain injury with subtle loss of gray-white differentiation.  cEEG with suppressed activity but was negative for any seizures.  On exam off sedation he is comatose, he does have preserved brainstem reflexes but with poor motor response.  Family requesting more time after we had explained the poor long-term neuro prognosis.       Assessment and Plan       Anoxic brain injury   Out-of-hospital PEA cardiac arrest s/p ROSC     59 year old male with a PMH of acute on chronic hypoxemic and hypercapnic repsiratory failure, BiPAP dependence, COPD, CKD 3, HTN, HLD, RV diastolic dysfunction, tricuspid regurgitation, DM2 who presented to the ED for out-of-hospital PEA cardiac arrest s/p ROSC. On admission, CT head showed some early changes concerning for anoxic brain injury with subtle loss of gray-white differentiation.  cEEG with suppressed activity but was negative for any seizures.  MRI brain revealed widespread changes of anoxic brain injury. On exam, off sedation he is comatose, he does have some preserved brainstem reflexes but with poor motor response.  Discussed with family about poor long-term neurological prognosis especially given exam and neurodiagnostic findings of catastrophic anoxic brain injury.  Family requested more time to discuss and to give Miguel more time before they could further clarify goals of care.       Recommendations               - Minimize  "sedation               -Euthermic, Euglycemia, normonatremia              -Continue supportive cares      Patient Follow-up    - in 6 weeks with general neurology or stroke VERONICA ([phone])    We will continue to follow.       The Stroke Staff is Dr. Whyte.    Lance Simon MD  Vascular Neurology Fellow    To page me or covering stroke neurology team member, click here: [facility]  Choose \"On Call\" tab at top, then select \"NEUROLOGY/ALL SITES\" from middle drop-down box, press Enter, then look for \"stroke\" or \"telestroke\" for your site.    Physical Examination     Temp: 99.1  F (37.3  C) Temp src: Esophageal BP: 129/71 Pulse: 86   Resp: 17 SpO2: (!) 91 % O2 Device: Mechanical Ventilator      Neurologic (4 score coma scale :- 7 points E1 M1 B4 R1)  Mental Status:   Comatose, not opening eyes to voice, opening eyes to sternal rubs, does not track, does not follow commands, breathing above vent rate.   Cranial Nerves:  visual fields not intact to threat, PERRL, oculocephalic reflex present, weak cough, corneal reflex intact.   Motor: min withdrawal in R upper and lower extremities.(Likely reflexive), No withdrawal in LUE  Reflexes: Unequivocal  Coordination: Unable to test  Station/Gait:  deferred      Imaging/Labs   (Bolded imaging and labs new and/or personally reviewed or re-reviewed by me today)    MRI/Head CT CT Head   IMPRESSION:  1.  Some loss of gray-white matter differentiation involving the bilateral basal ganglia, thalami, and potentially the insular cortex compared to the previous exam. Hypoxic/ischemic injury in these locations is possible. The extent of ischemic injuries   would be more accurately evaluated by MRI.  2.  No hemorrhage, extra-axial collection, or mass effect.    MRI brain  IMPRESSION:  1.  Evidence of diffuse anoxic brain injury.  2.  No intracranial hemorrhage.   Intracranial Vasculature Not done   Cervical Vasculature Not done     Echocardiogram The left ventricle is normal in " size.  Left ventricular systolic function is normal.  The visual ejection fraction is 55-60%.  Normal left ventricular wall motion  The right ventricle is normal in structure, function and size.  Mild valvular aortic stenosis. The mean AoV pressure gradient is 13mmHg.  The rhythm was atrial fibrillation.  There is no comparison study available.   EKG/Telemetry AF     LDL  No lab value available in past 30 days   A1C  No lab value available in past 90 days   Troponin No lab value available in past 48 hrs     Other labs reviewed by me today:

## 2025-03-23 NOTE — PROGRESS NOTES
Comprehensive Daily ICU Note        Miguel Rivera MRN# [id number]   Age: 59 year old YOB: 1965     Date of Admission: 3/18/2025    Primary care provider: No Ref-Primary, Physician     CODE STATUS: Full      Problem List:   Active Problems:    Anoxic brain damage (H)    Cardiopulmonary arrest (H)    Closed fracture of multiple ribs, unspecified laterality, initial encounter    Aspiration pneumonia (H)    Acute on chronic respiratory failure with hypoxia and hypercapnia (H)    COPD exacerbation (H)            Subjective/ Last 24 hours:   Events:        Physical Examination:       Temp:  [98.8  F (37.1  C)-100.2  F (37.9  C)] 99.1  F (37.3  C)  Pulse:  [72-91] 86  Resp:  [13-22] 17  BP: (128-150)/(69-80) 129/71  FiO2 (%):  [40 %-50 %] 40 %  SpO2:  [89 %-96 %] 91 %  General: Intubated, eyes closed   HEENT: ET and OG with intermittent thick ET tube secretions  Lungs: Synchronous with ventilator with breath rate above set rate, inspiratory rales in bases of posterior lung fields   CVS: RR, no mcg  Abdomen: Protruding abdomen, hypoactive BS  Extremities/musculoskeletal/skin: More swollen right arm, very mild swelling on left arm  Neurology/Psychiatry: Does not open eyes spontaneous, pupils reactive but roving eye movements, triple flexion withdrawal to pain in all extremities but no spontaneous movements   exam of Line sites: picc line           Medications:     Current Facility-Administered Medications   Medication Dose Route Frequency Provider Last Rate Last Admin    acetylcysteine (MUCOMYST) 20 % nebulizer solution 2 mL  2 mL Nebulization Q6H Hilton Schwartz MD        cefTRIAXone (ROCEPHIN) 2 g vial to attach to  ml bag for ADULTS or NS 50 ml bag for PEDS  2 g Intravenous Q24H Hilton Schwartz MD   2 g at 03/22/25 1600    chlorhexidine (PERIDEX) 0.12 % solution 15 mL  15 mL Mouth/Throat Q12H Annette Taylor PA-C   15 mL at 03/23/25 0732    enoxaparin ANTICOAGULANT (LOVENOX) injection  40 mg  40 mg Subcutaneous Q24H Kendra Matt MD   40 mg at 03/23/25 1300    insulin aspart (NovoLOG) injection (RAPID ACTING)  1-7 Units Subcutaneous Q4H Kam Hutchnis PA-C   1 Units at 03/20/25 0105    ipratropium - albuterol 0.5 mg/2.5 mg/3 mL (DUONEB) neb solution 3 mL  3 mL Nebulization Q6H Hilton Schwartz MD        methylPREDNISolone sodium succinate (SOLU-MEDROL) injection 40 mg  40 mg Intravenous Q24H Kendra Matt MD   40 mg at 03/23/25 1300    pantoprazole (PROTONIX) 2 mg/mL suspension 40 mg  40 mg Per Feeding Tube [facility] Annette Taylor PA-C   40 mg at 03/21/25 0909    Or    pantoprazole (PROTONIX) IV push injection 40 mg  [facility]   40 mg at 03/23/25 0732    sodium chloride (PF) 0.9% PF flush 10-40 mL  10-40 mL Intracatheter Q7 Days Adriel Meléndez MD   10 mL at 03/19/25 1103    sodium chloride (PF) 0.9% PF flush 10-40 mL  10-40 mL Intracatheter Daily Adriel Meléndez MD   20 mL at 03/23/25 1010        Current Facility-Administered Medications   Medication Dose Route Frequency Provider Last Rate Last Admin    sodium chloride 0.9 % infusion   Intravenous Continuous Hilton cShwartz MD 10 mL/hr at 03/23/25 1453 New Bag at 03/23/25 1453       Current Facility-Administered Medications   Medication Dose Route Frequency Provider Last Rate Last Admin    acetaminophen (TYLENOL) tablet 650 mg  650 mg Oral or Feeding Tube Q6H PRN Hilton Schwartz MD        Or    acetaminophen (TYLENOL) oral liquid 650 mg  650 mg Per NG tube Q6H PRN Hilton Schwartz MD        albuterol (PROVENTIL) neb solution 2.5 mg  2.5 mg Nebulization Q4H PRN Annette Taylor PA-C        glucose gel 15-30 g  15-30 g Oral Q15 Min PRN Kam Hutchins PA-C        Or    dextrose 50 % injection 25-50 mL  25-50 mL Intravenous Q15 Min PRN Kam Hutchins PA-C        Or    glucagon injection 1 mg  1 mg Subcutaneous Q15 Min PRN Kam Hutchins PA-C         HYDROmorphone (DILAUDID) injection 0.2 mg  0.2 mg Intravenous Q2H PRN Annette Taylor PA-C   0.2 mg at 03/21/25 0408    [Held by provider] midazolam (VERSED) injection 4 mg  4 mg Intravenous Q1H PRN Mir Jacobson MD   4 mg at 03/18/25 1924    naloxone (NARCAN) injection 0.2 mg  0.2 mg Intravenous Q2 Min PRN John Ahuja RP        Or    naloxone (NARCAN) injection 0.4 mg  0.4 mg Intravenous Q2 Min PRN AhujaJohn freeman RPH        Or    naloxone (NARCAN) injection 0.2 mg  0.2 mg Intramuscular Q2 Min PRN John Ahuja RPH        Or    naloxone (NARCAN) injection 0.4 mg  0.4 mg Intramuscular Q2 Min PRN John Ahuja, RPH        ondansetron (ZOFRAN ODT) ODT tab 4 mg  4 mg Oral Q6H PRN Annette Taylor PA-C        Or    ondansetron (ZOFRAN) injection 4 mg  4 mg Intravenous Q6H PRN Annette Taylor PA-C        polyethylene glycol (MIRALAX) Packet 17 g  17 g Oral Daily PRN Annette Taylor PA-C        senna-docusate (SENOKOT-S/PERICOLACE) 8.6-50 MG per tablet 1 tablet  1 tablet Oral BID PRN Annette Taylor PA-C        Or    senna-docusate (SENOKOT-S/PERICOLACE) 8.6-50 MG per tablet 2 tablet  2 tablet Oral BID PRN Annette Taylor PA-C        sodium chloride (PF) 0.9% PF flush 10-20 mL  10-20 mL Intracatheter q1 min prn Adriel Meléndez MD             Imaging and Other Data:        EEG Video 12-26 hr Unmonitored  EEG Video 12-26 hr Unmonitored Result    VIDEO EEG DATE: 3/20/25  VIDEO EEG LOG: [facility] [phone] [facility]  VIDEO EEG DAY#: 1  VIDEO EEG SOURCE FILE DURATION: 14 Hours and 59 Minutes    PATIENT INFORMATION: Miguel Rivera is a 59 year old male with PMH of   acute on chronic hypoxemic and hypercapnic respiratory failure with Bipap   dependence, COPD, HTN, HLD, Stage III CKD, RV diastolic dysfunction,   Tricuspid Regurgitation, and Type II DM, resented to [facility] ED via EMS   after unwitnessed cardiac arrest. EEG is being done to monitor for   seizures.      MEDICATIONS: Reviewed-See electronic health record for list of medications   administered on the day of this recording  These medications and doses were derived from the medical record at the   time of this procedure.    TECHNICAL SUMMARY: EEG was recorded from 24 MRI COMP. WIRES scalp   electrodes placed according to the 10-20 international system. Additional   electrodes were used for referencing, EKG, and to record from other   cerebral regions as appropriate. Video was continuously recorded and was   reviewed for clinical correlation. Electrodes were attached and both video   and EEG were monitored and annotated by qualified EEG technologists.   Video-EEG was reviewed and report generated by qualified physician.    BACKGROUND ACTIVITY: There was generalized suppression of the background   with minimal electrocerebral activities.  No electrocerebral activities   greater than 5  V were noted when looking at 2  V/mm sensitivity.    ACTIVATION PROCEDURE: Was performed at 1402.  The patient withdrew to   painful stimulation to his lower extremities.  EEG was not reactive.    INTERICTAL EPILEPTIFORM DISCHARGES: None    ICTAL: No clinical events or electrographic seizures were recorded. Video   was reviewed intermittently by EEG technologist and physician for clinical   seizures.     IMPRESSION OF VIDEO EEG DAY # 1: This video EEG is abnormal due to the   presence of generalized suppression of the background consistent with   severe diffuse nonspecific encephalopathy.  Sedative medications could in   part contribute to this finding, however the degree of suppression is   beyond medication effect.  No epileptiform discharges or electrographic   seizures were recorded.  Clinical correlation is advised.    Jacy Monsalve MD  EPILEPSY STAFF   EEG Video 2-12 HRS Ummonitored  EEG Video 2-12 HRS Ummonitored Result    VIDEO EEG DATE: 3/21/2025  VIDEO EEG LOG: [facility]  VIDEO EEG DAY#: 2  VIDEO EEG SOURCE FILE  DURATION: 7 Hours and 19 Minutes    PATIENT INFORMATION: Miguel Rivera is a 59 year old male with PMH of   acute on chronic hypoxemic and hypercapnic respiratory failure with Bipap   dependence, COPD, HTN, HLD, Stage III CKD, RV diastolic dysfunction,   Tricuspid Regurgitation, and Type II DM, resented to [facility] ED via EMS   after unwitnessed cardiac arrest. EEG is being done to monitor for   seizures.     MEDICATIONS: Reviewed-See electronic health record for list of medications   administered on the day of this recording  These medications and doses were derived from the medical record at the   time of this procedure.    TECHNICAL SUMMARY: EEG was recorded from 24 MRI COMP. WIRES scalp   electrodes placed according to the 10-20 international system. Additional   electrodes were used for referencing, EKG, and to record from other   cerebral regions as appropriate. Video was continuously recorded and was   reviewed for clinical correlation. Electrodes were attached and both video   and EEG were monitored and annotated by qualified EEG technologists.   Video-EEG was reviewed and report generated by qualified physician.    BACKGROUND ACTIVITY: There was generalized suppression of the background   with minimal electrocerebral activities.  No electrocerebral activities   greater than 5  V were noted when looking at sensitivity of 2  V/mm.    ACTIVATION PROCEDURE: Was performed prior to seizure protocol at 814.  The   patient withdrew to painful stimulation to his lower extremities.  EEG was   not reactive.    INTERICTAL EPILEPTIFORM DISCHARGES: None    ICTAL: No clinical events or electrographic seizures were recorded. Video   was reviewed intermittently by EEG technologist and physician for clinical   seizures.     IMPRESSION OF VIDEO EEG DAY # 2: This video EEG is abnormal due to the   presence of generalized suppression of the background consistent with   severe diffuse nonspecific encephalopathy.  Sedative  drips could in part   contribute to this finding, however the degree of suppression is beyond   medication effect.  No epileptiform discharges or electrographic seizures   were recorded.  Clinical correlation is advised.    Jacy Monsalve MD  EPILEPSY STAFF   EEG Video 2-12 HRS Ummonitored  EEG Video 2-12 HRS Ummonitored Result    VIDEO EEG DATE: 3/19/2025  VIDEO EEG LOG: [facility]  5HR. VIDEO  VIDEO EEG SOURCE FILE DURATION: 5Hours and 30Minutes    PATIENT INFORMATION: Miguel Rivera is a 59 year old male with PMH of   acute on chronic hypoxemic and hypercapnic respiratory failure with Bipap   dependence, COPD, HTN, HLD, Stage III CKD, RV diastolic dysfunction,   Tricuspid Regurgitation, and Type II DM, resented to [facility] ED via EMS   after unwitnessed cardiac arrest. EEG is being done to evaluate for   seizures.     MEDICATIONS: Reviewed-See electronic health record for list of medications   administered on the day of this recording  These medications and doses were derived from the medical record at the   time of this procedure.    TECHNICAL SUMMARY: EEG was recorded from 24 wires scalp electrodes placed   according to the 10-20 international system. Additional electrodes were   used for referencing, EKG, and to record from other cerebral regions as   appropriate. Video was continuously recorded and was reviewed for clinical   correlation. Electrodes were attached and both video and EEG were   monitored and annotated by qualified EEG technologists. Video-EEG was   reviewed and report generated by qualified physician.    FINDINGS:  no well-organized posterior dominant rhythm sleep-wake cycling   was observed.  Background consisted of diffuse 2 to 5 Hz theta delta   activities.  There were high amplitude sharply contoured delta activities   in the frontal regions.  Frontally predominant generalized sharp waves   were seen intermittently which at times came periodically at 1 Hz.   There   were also sharp  transients over the right frontotemporal head region.    ICTAL: No clinical events or electrographic seizures were recorded. Video   was reviewed intermittently by EEG technologist and physician for clinical   seizures.     IMPRESSION OF VIDEO EEG: This video electroencephalogram is abnormal due   to the presence of diffuse delta predominant slowing with intermittent   generalized sharp waves which at times came as periodic discharges (GPDs).    Findings are consistent with severe diffuse nonspecific encephalopathy   and cortical irritability.  No electrographic seizures were recorded.    Clinical correlation is advised.     Jacy Monsalve MD  EPILEPSY STAFF   MR Brain w/o & w Contrast  Narrative: EXAM: MR BRAIN W/O and W CONTRAST  LOCATION: [facility]  DATE: 3/21/2025    INDICATION: Anoxic brain injury  COMPARISON: Head CT 3/18/2025  CONTRAST: 9 ml Gadavist  TECHNIQUE: Routine multiplanar multisequence head MRI without and with intravenous contrast.    FINDINGS:  INTRACRANIAL CONTENTS: Diffuse abnormal restricted diffusion involving the supratentorial cortex. Additional involvement in the basal ganglia. There is associated FLAIR signal hyperintensity. There is gyral swelling resulting in some sulcal effacement.   No shift of midline structures or sellar tonsillar herniation. No intracranial hemorrhage or pathologic enhancement.    SELLA: No abnormality accounting for technique.    OSSEOUS STRUCTURES/SOFT TISSUES: Normal marrow signal. The major intracranial vascular flow voids are maintained.     ORBITS: No abnormality accounting for technique.     SINUSES/MASTOIDS: Diffuse mucosal thickening. Complete/near complete opacification of the mastoid air cells bilaterally. No apparent mass in the posterior nasopharynx or skull base.   Impression: IMPRESSION:  1.  Evidence of diffuse anoxic brain injury.  2.  No intracranial hemorrhage.             Assessment and Plan:      Summary:    59-year-old man with COPD hypertension kidney disease and chronic hypercapnick and hypoxemic respiratory failure requiring oxygen who was found unresponsive by his family with disconnected oxygen.  Unknown downtime (ast known well 1608 and found around 1625).  EMS found patient in asystole.  Had return of spontaneous circulation approximately 30 minutes after EMS arrival.  In the ED, pt was found to be severe hypercapnic and intubated.. Admitted to ICU.  EEG showed severe encephalopathy.  Purulent secretions and hypoxic with airflow obstruction.  3/21, MRI consistent with severe anoxic encephalopathy.  EEG with very little spontaneous activity and patient unresponsive to painful stimuli.  Also being treat for strep species pneumonia.     Miguel Rivera is a 59 year old male admitted on 3/18/2025 for cardiac arrest.    I have personally reviewed the daily labs, imaging studies, cultures and discussed the case with referring physician and consulting physicians. My assessment and plan as follows:    Neurology and Psychiatry:  Assessment:   Current Facility-Administered Medications   Medication Dose Route Frequency Provider Last Rate Last Admin    sodium chloride 0.9 % infusion   Intravenous Continuous Schwartz, Hilton Sanchez MD 10 mL/hr at 03/23/25 1453 New Bag at 03/23/25 1453       Severe anoxic encephalopathy  Prolonged cardiac arrest before ROSC (30+ minutes) with unclear time down (25 or less minutes) before EMS arrival  CO2 immeasurably high upon arrival leaving   Post arrest 72 hr MRI brain imaging shows diffuse axonal brain injury.   Poor neuro exam- with VBG this morning show improved PvCo2 with appropriate pH, has been afebrile, does have concern for PNA but is being appropriately addressed   cEEG previously showed minimal brain activity and no seizures   Overall- poor prognosis with no expectation for meaningful neurologic recovery.     Plan   Prolonged family meeting 3/22 with patient's  multiple siblings both in person and over the phone with NCC and pt's RN present. Pt has no MADELEINE/POA and no next of kin default decision making falls to siblings. Obviously, ideal situation would be for all family members to come to an agreement on next steps. After Dr. Whyte spent an extended period of time explaining to the family the imaging and EEG and clinical exam findings and the poor prognosis of expected continued coma or vegetative state, and I mentioned the relative stability of the other systems, majority of family members wanted to give some more time to see if any improvement could occur over the next week (one brother was okay with moving towards comfort already). In the meantime, did discuss change of code status to DNR while continuing other current cares, but one sibling was adamant that this not be done as he found it the equivalent to making him comfort care if he were to arrest again so no change to the code was made.   For now, continue full code, plan will be to assess for any improvement over the next 7 days while family continues to talk amongst themselves to form consensus (the ideal scenario)  In case an unideal scenerio must be pursued, we will clarify with the legal department via case management if [place] state law allows decision making by one or majority of siblings instead of full consensus and if any additional process may need to occur (such court order designation of MADELEINE/POA)    Cardiovascular and Hemodynamics:  Assessment:  Temp:  [98.8  F (37.1  C)-100.2  F (37.9  C)] 99.1  F (37.3  C)  Pulse:  [72-91] 86  Resp:  [13-22] 17  BP: (128-150)/(69-80) 129/71  FiO2 (%):  [40 %-50 %] 40 %  SpO2:  [89 %-96 %] 91 %         Intake/Output Summary (Last 24 hours) at 3/23/2025 1534  Last data filed at 3/23/2025 1400  Gross per 24 hour   Intake 1570 ml   Output 2555 ml   Net -985 ml         Echo result w/o MOPS: Interpretation Summary The left ventricle is normal in size.Left ventricular  systolic function is normal.The visual ejection fraction is 55-60%.Normal left ventricular wall motionThe right ventricle is normal in structure, function and size.Mild valvular aortic stenosis. The mean AoV pressure gradient is 13mmHg.The rhythm was atrial fibrillation.There is no comparison study available.      Vasoplegic/cardiogenic shock- resolved  Asystolic arrest.  From history respiratory as a primary source seems most likely.  EKG not consistent with STEMI.  Troponin minimally elevated  Medical problems include chronic hypoxic/hypercapneic respiratory failure and NIV usage, hypertension, dyslipidemia and atrial fibrillation and previous dx of diastolic heart failure/PH and tricuspid regurgitation (the latter two issues appear improved on recent TTE) .  Blood pressures now trending higher  Ectopy that has resolved.   5.  Swollen right arm concern for PICC line related issue rather than volume overload    Plan  Monitor hemodynamics closely.  Initiate antihypertensive if necessary.  There is no indication for evaluation for coronary artery disease  RUE US to assess for DVT    Pulmonary/ID:  Assessment        FiO2 (%): 40 %, Resp: 17, Vent Mode: CPAP/PS, Resp Rate (Set): 10 breaths/min, Tidal Volume (Set, mL): 450 mL, PEEP (cm H2O): 5 cmH2O, Pressure Support (cm H2O): 10 cmH2O, Resp Rate (Set): 10 breaths/min, Tidal Volume (Set, mL): 450 mL, PEEP (cm H2O): 5 cmH2O Arterial Blood Gas  Recent Labs   Lab 03/23/25  0434 03/22/25  1043 03/20/25  1302 03/20/25  0441 03/19/25  0028 03/18/25  2202 03/18/25  2142 03/18/25  1821   PH  --   --   --   --   --  7.39  --  7.30*   PCO2  --   --   --   --   --  80*  --  87*   PO2  --   --   --   --   --  70*  --  57*   HCO3  --   --   --   --   --  48*  --  42*   O2PER 50 60 60 60   < > 70   < >  --     < > = values in this interval not displayed.     pH Venous   Date Value Ref Range Status   03/23/2025 7.42 7.32 - 7.43 Final   03/22/2025 7.41 7.32 - 7.43 Final   03/20/2025  7.35 7.32 - 7.43 Final     pCO2 Venous   Date Value Ref Range Status   03/23/2025 49 40 - 50 mm Hg Final   03/22/2025 54 (H) 40 - 50 mm Hg Final   03/20/2025 85 (HH) 40 - 50 mm Hg Final       Acute on chronic hypoxic and hypercapnic respiratory failure.  pH 7.3 pCO2 90 suggesting profound chronic hypercapnia.  On ventilator today, 3/20, evidence of severe airflow obstruction.  Improved somewhat after decreasing respiratory rate and increasing tidal volume to 9 mL/kg. Pressure support was not helpful.  Aspiration pneumonia: Low-grade fever, leukocytosis, purulent secretions, Streptococcus growing in sputum.  On antibiotics  Plan:  Narrow antibiotics from Zosyn to ceftriaxone   Continue bronchodilators and high steroids; added mucomyst to help mucus plugging   Continue current ventilator settings- placed patient on PS 10/5 and weaned FiO2 in the morning until mucus plugging event in afternoon was doing well  Vap precautions: HOB 30, chlorhexidine  5.   Check daily VBGs- PvCO2 today significantly improved into compensated level  6.   Maintain current vent settings- weaning FiO2 and PEEP as able    GI and Nutrition:  Assessment:  AST   Date Value Ref Range Status   03/23/2025 67 (H) 0 - 45 U/L Final   03/20/2025 60 (H) 0 - 45 U/L Final   03/19/2025   Final     Comment:     Unsatisfactory specimen - hemolyzed       ALT   Date Value Ref Range Status   03/23/2025 17 0 - 70 U/L Final   03/20/2025 30 0 - 70 U/L Final   03/19/2025 40 0 - 70 U/L Final     Bilirubin Total   Date Value Ref Range Status   03/23/2025 0.2 <=1.2 mg/dL Final   03/20/2025 0.2 <=1.2 mg/dL Final   03/19/2025 0.2 <=1.2 mg/dL Final     Protein Total   Date Value Ref Range Status   03/23/2025 6.3 (L) 6.4 - 8.3 g/dL Final   03/20/2025 6.1 (L) 6.4 - 8.3 g/dL Final   03/19/2025 6.1 (L) 6.4 - 8.3 g/dL Final     Albumin   Date Value Ref Range Status   03/23/2025 3.2 (L) 3.5 - 5.2 g/dL Final   03/20/2025 2.9 (L) 3.5 - 5.2 g/dL Final   03/19/2025 2.9 (L) 3.5 -  "5.2 g/dL Final     Alkaline Phosphatase   Date Value Ref Range Status   03/23/2025 65 40 - 150 U/L Final   03/20/2025 91 40 - 150 U/L Final   03/19/2025 107 40 - 150 U/L Final     Transaminitis likely secondary to arrest given elevated lactic acid.  Improving.   Nutritional Plan: Orders Placed This Encounter      NPO for Medical/Clinical Reasons Except for: No Exceptions      Plan:   Monitor   Stress ulcer: PPI  Stop time for the IVFs with RD ordering tube feeds today    Renal, Fluid and Electrolytes:  Assessment:  BMP  Recent Labs   Lab 03/23/25  0434 03/22/25  0427 03/22/25  0116 03/21/25  0413 03/20/25  0441 03/19/25  0520   * 137  --   --  143 143   POTASSIUM 4.5 4.5  4.4  --  4.6 3.7 4.3   CHLORIDE 97* 99  --   --  95* 94*   CO2 29 29  --   --  45* 44*   ANIONGAP 8 9  --   --  3* 5*   BUN 20.0 25.4*  --   --  30.5* 25.6*   CR 0.64* 0.69 0.71  --  0.76 0.69   GFRESTIMATED >90 >90 >90  --  >90 >90   LOLA 8.3* 8.4*  --   --  8.2* 8.2*   MAG 1.7 1.8  --  2.2 2.1 2.2   PHOS 3.1 1.9*  --  2.9 2.4* 3.1     Lactic Acid:  Lactic Acid   Date Value Ref Range Status   03/20/2025 0.8 0.7 - 2.0 mmol/L Final   03/19/2025 0.7 0.7 - 2.0 mmol/L Final   03/19/2025 2.4 (H) 0.7 - 2.0 mmol/L Final     No results found for: \"KETONE\"        Intake/Output Summary (Last 24 hours) at 3/23/2025 1531  Last data filed at 3/23/2025 1400  Gross per 24 hour   Intake 1570 ml   Output 2555 ml   Net -985 ml         02/21 2300 - 03/23 2259  In: 7716.51 [I.V.:6661.51]  Out: 9925 [Urine:9875]  Net: -2208.49        Chronic respiratory acidosis with metabolic compensation- improved PvCO2 numbers.  Urine output adequate  Abnormal electrolytes - replace prn    Plan:   Stop IV fluids today as starting TFs started   Replace electrolytes if abnormal  monitor I/O, urine output, kidney function and electrolytes and treat as needed. Avoid nephrotoxic agents.    Infectious Disease:  Assessment:        7-Day Micro Results       Collected Updated Procedure " Result Status      03/19/2025 0426 03/21/2025 0756 Respiratory Aerobic Bacterial Culture with Gram Stain [[id number]]   (Abnormal)   Aspirate from Endotracheal    Final result Component Value   Culture 4+ Normal Jorge   Gram Stain Result >25 PMNs/low power field    4+ Gram positive bacilli resembling diphtheroids    1+ Gram positive cocci               03/18/2025 2207 03/22/2025 1418 Respiratory Aerobic Bacterial Culture with Gram Stain [[id number]]    (Abnormal)   Sputum from Endotracheal    Final result Component Value   Culture 4+ Streptococcus pseudopneumoniae    Identification obtained by MALDI-TOF mass spectrometry research use only database. Test characteristics determined and verified by the [facility].    4+ Normal jorge   Gram Stain Result >25 PMNs/low power field    3+ Gram positive bacilli resembling diphtheroids    1+ Gram positive cocci        Susceptibility        Streptococcus pseudopneumoniae      WALLY      Ceftriaxone (meningitis) <=0.25 ug/mL Susceptible      Ceftriaxone (non-meningitis) <=0.25 ug/mL Susceptible      Clindamycin <=0.12 ug/mL Susceptible  [1]       Erythromycin >2 ug/mL Resistant      Levofloxacin <=1 ug/mL Susceptible      Penicillin (meningitis) <=0.06 ug/mL Susceptible      Penicillin (non-meningitis) <=0.06 ug/mL Susceptible      Penicillin(oral) <=0.06 ug/mL Susceptible      Vancomycin 0.5 ug/mL Susceptible                   [1]  This isolate DOES NOT demonstrate inducible clindamycin resistance in vitro. Clindamycin is susceptible and could be used when indicated, however, erythromycin is resistant and should not be used.                    03/18/2025 1728 03/22/2025 2316 Blood Culture Peripheral Blood [[id number]]   Peripheral Blood    Preliminary result Component Value   Culture No growth after 4 days  [P]                03/18/2025 1728 03/22/2025 2007 Blood Culture Peripheral Blood [[id number]]   Peripheral Blood    Preliminary result  "Component Value   Culture No growth after 4 days  [P]                      Aspiration pneumonia: Low-grade fever, leukocytosis, purulent secretions, Streptococcus growing in sputum.  On antibiotics    Plan:  Continue ceftriaxone - complete total 7d of antibiotics        Hematology and Oncology:  Assessment:  Recent Labs   Lab 03/23/25  0434 03/22/25  0116 03/20/25  0441 03/19/25  0520   WBC 9.3 10.7 12.2* 12.9*   RBC 3.26* 3.05* 3.03* 3.20*   HGB 9.2* 8.7* 8.6* 9.0*   HCT 30.3* 29.5* 30.0* 31.2*   MCV 93 97 99 98   MCH 28.2 28.5 28.4 28.1   MCHC 30.4* 29.5* 28.7* 28.8*   RDW 14.5 14.6 14.6 14.4    257  257 240 225     No lab results found in last 7 days.  No results found for: \"AAUFH\"      1.  Moderate anemia.  No signs of bleeding  2. Leukocytosis secondary to stress and likely aspiration pneumonia- improving.     Plan:  Treat with antibiotics   anticoagulation:  Enoxaparin at prophylactic dose    Endocrinology and Rheumatology:  Assessment:  Stress hyperglycemia  Recent Labs   Lab 03/23/25  1249 03/23/25  0742 03/23/25  0434 03/23/25  0433 03/23/25  0007 03/22/25  2043   GLC 90 81 85 84 94 117*      Plan:  Insulin orders in place.  Has not required  Steroids    Skin and Musculoskeletal:  Assessment:  Standard skin cares          Family update: Family conference yesterday; family updated by RN today        Clinically Significant Risk Factors     # Obesity: Estimated body mass index is 39.31 kg/m  as calculated from the following:    Height as of this encounter: 1.575 m (5' 2\").    Weight as of this encounter: 97.5 kg (214 lb 15.2 oz).  # Severe Malnutrition: based on nutrition assessment and treatment provided per dietitian's recommendations.        [facility] ICU Rounding checklist    Assessment of central venous catheter access Central access present and needed   Assessment of urinary catheter use Blount present and needed.  Indication:  obstruction and hemodynamic instability   DVT prophylaxis Subcutaneous " heparin product (heparin or LMWH)         [facility]  CRITICAL CARE STAFF NOTE    I am managing these acute and ongoing critical issues resulting in critical condition that impairs one or more vital organ systems, incur a high probability of imminent or life-threatening deterioration in the patient's condition and providing frequent personal assessment and manipulation of medications and life support equipment.     1. Out of hospital cardiac arrest  2. Acute on chronic hypoxic and hypercapnic respiratory failure   3. Diffuse anoxic devastating brain injury   4. Aspiration pneumonia     ICU Interventions: ventilator management, parenteral medications necessitating continuous monitoring including vasoactive medications, medication for heart rhythm control, insulin infusion, and/or sedative/opiates , and interpretation of lab values, cardiac output, cxr, pulse oximetry, blood gases, and/or information/data stored in computers    The plan was formulated in conjunction with pharmacy, ICU nurses, and respiratory therapist. I have evaluated all laboratory values and imaging studies for the past 24 hours. I have reviewed all the consults that have been ordered and are active for this patient.      Critical Care Time:  60 min.  I spent this time (excluding procedures) personally providing and directing critical care services at the bedside and on the critical care unit.      Hilton Schwartz MD   of Medicine, Pulmonary/CC  March 23, 2025

## 2025-03-24 LAB
ALBUMIN SERPL BCG-MCNC: 3.1 G/DL (ref 3.5–5.2)
ALP SERPL-CCNC: 63 U/L (ref 40–150)
ALT SERPL W P-5'-P-CCNC: 16 U/L (ref 0–70)
ANION GAP SERPL CALCULATED.3IONS-SCNC: 7 MMOL/L (ref 7–15)
AST SERPL W P-5'-P-CCNC: 47 U/L (ref 0–45)
BASE EXCESS BLDV CALC-SCNC: 5.6 MMOL/L (ref -3–3)
BASE EXCESS BLDV CALC-SCNC: 6 MMOL/L (ref -3–3)
BASE EXCESS BLDV CALC-SCNC: 6.9 MMOL/L (ref -3–3)
BILIRUB SERPL-MCNC: 0.2 MG/DL
BUN SERPL-MCNC: 22.5 MG/DL (ref 8–23)
CALCIUM SERPL-MCNC: 8.4 MG/DL (ref 8.8–10.4)
CHLORIDE SERPL-SCNC: 96 MMOL/L (ref 98–107)
CREAT SERPL-MCNC: 0.56 MG/DL (ref 0.67–1.17)
EGFRCR SERPLBLD CKD-EPI 2021: >90 ML/MIN/1.73M2
ERYTHROCYTE [DISTWIDTH] IN BLOOD BY AUTOMATED COUNT: 14.4 % (ref 10–15)
GLUCOSE BLDC GLUCOMTR-MCNC: 104 MG/DL (ref 70–99)
GLUCOSE BLDC GLUCOMTR-MCNC: 105 MG/DL (ref 70–99)
GLUCOSE BLDC GLUCOMTR-MCNC: 117 MG/DL (ref 70–99)
GLUCOSE BLDC GLUCOMTR-MCNC: 143 MG/DL (ref 70–99)
GLUCOSE BLDC GLUCOMTR-MCNC: 143 MG/DL (ref 70–99)
GLUCOSE BLDC GLUCOMTR-MCNC: 156 MG/DL (ref 70–99)
GLUCOSE BLDC GLUCOMTR-MCNC: 85 MG/DL (ref 70–99)
GLUCOSE SERPL-MCNC: 97 MG/DL (ref 70–99)
HCO3 BLDV-SCNC: 32 MMOL/L (ref 21–28)
HCO3 BLDV-SCNC: 33 MMOL/L (ref 21–28)
HCO3 BLDV-SCNC: 33 MMOL/L (ref 21–28)
HCO3 SERPL-SCNC: 30 MMOL/L (ref 22–29)
HCT VFR BLD AUTO: 32.4 % (ref 40–53)
HGB BLD-MCNC: 9.7 G/DL (ref 13.3–17.7)
MAGNESIUM SERPL-MCNC: 2.1 MG/DL (ref 1.7–2.3)
MCH RBC QN AUTO: 27.8 PG (ref 26.5–33)
MCHC RBC AUTO-ENTMCNC: 29.9 G/DL (ref 31.5–36.5)
MCV RBC AUTO: 93 FL (ref 78–100)
NT-PROBNP SERPL-MCNC: 342 PG/ML (ref 0–900)
O2/TOTAL GAS SETTING VFR VENT: 100 %
O2/TOTAL GAS SETTING VFR VENT: 100 %
O2/TOTAL GAS SETTING VFR VENT: 60 %
OXYHGB MFR BLDV: 79 % (ref 70–75)
OXYHGB MFR BLDV: 86 % (ref 70–75)
OXYHGB MFR BLDV: 88 % (ref 70–75)
PCO2 BLDV: 54 MM HG (ref 40–50)
PCO2 BLDV: 56 MM HG (ref 40–50)
PCO2 BLDV: 62 MM HG (ref 40–50)
PH BLDV: 7.33 [PH] (ref 7.32–7.43)
PH BLDV: 7.38 [PH] (ref 7.32–7.43)
PH BLDV: 7.39 [PH] (ref 7.32–7.43)
PHOSPHATE SERPL-MCNC: 3.7 MG/DL (ref 2.5–4.5)
PLATELET # BLD AUTO: 258 10E3/UL (ref 150–450)
PO2 BLDV: 48 MM HG (ref 25–47)
PO2 BLDV: 55 MM HG (ref 25–47)
PO2 BLDV: 62 MM HG (ref 25–47)
POTASSIUM SERPL-SCNC: 4.8 MMOL/L (ref 3.4–5.3)
PROT SERPL-MCNC: 6.3 G/DL (ref 6.4–8.3)
RBC # BLD AUTO: 3.49 10E6/UL (ref 4.4–5.9)
SAO2 % BLDV: 81.2 % (ref 70–75)
SAO2 % BLDV: 88.1 % (ref 70–75)
SAO2 % BLDV: 89.4 % (ref 70–75)
SODIUM SERPL-SCNC: 133 MMOL/L (ref 135–145)
WBC # BLD AUTO: 15.1 10E3/UL (ref 4–11)

## 2025-03-24 PROCEDURE — 999N000289 HC STATISTIC BRONCHOSCOPY ASST

## 2025-03-24 PROCEDURE — 200N000001 HC R&B ICU

## 2025-03-24 PROCEDURE — 31646 BRNCHSC W/THER ASPIR SBSQ: CPT | Mod: GC | Performed by: INTERNAL MEDICINE

## 2025-03-24 PROCEDURE — 87070 CULTURE OTHR SPECIMN AEROBIC: CPT | Performed by: STUDENT IN AN ORGANIZED HEALTH CARE EDUCATION/TRAINING PROGRAM

## 2025-03-24 PROCEDURE — 84100 ASSAY OF PHOSPHORUS: CPT | Performed by: INTERNAL MEDICINE

## 2025-03-24 PROCEDURE — 99291 CRITICAL CARE FIRST HOUR: CPT | Mod: GC | Performed by: PSYCHIATRY & NEUROLOGY

## 2025-03-24 PROCEDURE — 250N000011 HC RX IP 250 OP 636: Performed by: INTERNAL MEDICINE

## 2025-03-24 PROCEDURE — 94640 AIRWAY INHALATION TREATMENT: CPT

## 2025-03-24 PROCEDURE — 0B978ZZ DRAINAGE OF LEFT MAIN BRONCHUS, VIA NATURAL OR ARTIFICIAL OPENING ENDOSCOPIC: ICD-10-PCS | Performed by: INTERNAL MEDICINE

## 2025-03-24 PROCEDURE — 272N000220 HC BRONCHOSCOPE, DISPOSABLE

## 2025-03-24 PROCEDURE — 250N000013 HC RX MED GY IP 250 OP 250 PS 637

## 2025-03-24 PROCEDURE — 80053 COMPREHEN METABOLIC PANEL: CPT | Performed by: INTERNAL MEDICINE

## 2025-03-24 PROCEDURE — 250N000013 HC RX MED GY IP 250 OP 250 PS 637: Performed by: INTERNAL MEDICINE

## 2025-03-24 PROCEDURE — 83735 ASSAY OF MAGNESIUM: CPT | Performed by: INTERNAL MEDICINE

## 2025-03-24 PROCEDURE — 0B9C8ZZ DRAINAGE OF RIGHT UPPER LUNG LOBE, VIA NATURAL OR ARTIFICIAL OPENING ENDOSCOPIC: ICD-10-PCS | Performed by: INTERNAL MEDICINE

## 2025-03-24 PROCEDURE — 250N000009 HC RX 250: Performed by: SURGERY

## 2025-03-24 PROCEDURE — 82805 BLOOD GASES W/O2 SATURATION: CPT | Performed by: STUDENT IN AN ORGANIZED HEALTH CARE EDUCATION/TRAINING PROGRAM

## 2025-03-24 PROCEDURE — 999N000009 HC STATISTIC AIRWAY CARE

## 2025-03-24 PROCEDURE — 85027 COMPLETE CBC AUTOMATED: CPT | Performed by: INTERNAL MEDICINE

## 2025-03-24 PROCEDURE — 250N000009 HC RX 250: Performed by: STUDENT IN AN ORGANIZED HEALTH CARE EDUCATION/TRAINING PROGRAM

## 2025-03-24 PROCEDURE — 99291 CRITICAL CARE FIRST HOUR: CPT | Mod: 25 | Performed by: INTERNAL MEDICINE

## 2025-03-24 PROCEDURE — 94003 VENT MGMT INPAT SUBQ DAY: CPT

## 2025-03-24 PROCEDURE — 83880 ASSAY OF NATRIURETIC PEPTIDE: CPT | Performed by: INTERNAL MEDICINE

## 2025-03-24 PROCEDURE — 250N000011 HC RX IP 250 OP 636: Mod: JZ | Performed by: INTERNAL MEDICINE

## 2025-03-24 PROCEDURE — 250N000009 HC RX 250: Performed by: INTERNAL MEDICINE

## 2025-03-24 PROCEDURE — 999N000157 HC STATISTIC RCP TIME EA 10 MIN

## 2025-03-24 PROCEDURE — 258N000003 HC RX IP 258 OP 636: Performed by: INTERNAL MEDICINE

## 2025-03-24 PROCEDURE — 250N000011 HC RX IP 250 OP 636: Mod: JZ

## 2025-03-24 PROCEDURE — 0B9F8ZZ DRAINAGE OF RIGHT LOWER LUNG LOBE, VIA NATURAL OR ARTIFICIAL OPENING ENDOSCOPIC: ICD-10-PCS | Performed by: INTERNAL MEDICINE

## 2025-03-24 PROCEDURE — 82805 BLOOD GASES W/O2 SATURATION: CPT | Performed by: INTERNAL MEDICINE

## 2025-03-24 PROCEDURE — 0BJ08ZZ INSPECTION OF TRACHEOBRONCHIAL TREE, VIA NATURAL OR ARTIFICIAL OPENING ENDOSCOPIC: ICD-10-PCS | Performed by: INTERNAL MEDICINE

## 2025-03-24 PROCEDURE — 31645 BRNCHSC W/THER ASPIR 1ST: CPT | Performed by: INTERNAL MEDICINE

## 2025-03-24 PROCEDURE — 99292 CRITICAL CARE ADDL 30 MIN: CPT | Mod: 25 | Performed by: INTERNAL MEDICINE

## 2025-03-24 PROCEDURE — 87205 SMEAR GRAM STAIN: CPT | Performed by: STUDENT IN AN ORGANIZED HEALTH CARE EDUCATION/TRAINING PROGRAM

## 2025-03-24 PROCEDURE — 250N000011 HC RX IP 250 OP 636: Performed by: STUDENT IN AN ORGANIZED HEALTH CARE EDUCATION/TRAINING PROGRAM

## 2025-03-24 PROCEDURE — 94640 AIRWAY INHALATION TREATMENT: CPT | Mod: 76

## 2025-03-24 RX ORDER — FUROSEMIDE 10 MG/ML
20 INJECTION INTRAMUSCULAR; INTRAVENOUS EVERY 12 HOURS
Status: DISCONTINUED | OUTPATIENT
Start: 2025-03-24 | End: 2025-03-24

## 2025-03-24 RX ORDER — BUDESONIDE 0.25 MG/2ML
0.25 INHALANT ORAL 2 TIMES DAILY
Status: DISCONTINUED | OUTPATIENT
Start: 2025-03-24 | End: 2025-03-26

## 2025-03-24 RX ORDER — AMPICILLIN AND SULBACTAM 2; 1 G/1; G/1
3 INJECTION, POWDER, FOR SOLUTION INTRAMUSCULAR; INTRAVENOUS EVERY 6 HOURS
Status: DISCONTINUED | OUTPATIENT
Start: 2025-03-24 | End: 2025-03-26

## 2025-03-24 RX ORDER — VECURONIUM BROMIDE 1 MG/ML
10 INJECTION, POWDER, LYOPHILIZED, FOR SOLUTION INTRAVENOUS ONCE
Status: COMPLETED | OUTPATIENT
Start: 2025-03-24 | End: 2025-03-24

## 2025-03-24 RX ORDER — WATER 10 ML/10ML
INJECTION INTRAMUSCULAR; INTRAVENOUS; SUBCUTANEOUS
Status: COMPLETED
Start: 2025-03-24 | End: 2025-03-24

## 2025-03-24 RX ORDER — FENTANYL CITRATE 50 UG/ML
50 INJECTION, SOLUTION INTRAMUSCULAR; INTRAVENOUS ONCE
Status: COMPLETED | OUTPATIENT
Start: 2025-03-24 | End: 2025-03-24

## 2025-03-24 RX ORDER — LIDOCAINE HYDROCHLORIDE 10 MG/ML
10 INJECTION, SOLUTION EPIDURAL; INFILTRATION; INTRACAUDAL; PERINEURAL ONCE
Status: COMPLETED | OUTPATIENT
Start: 2025-03-24 | End: 2025-03-24

## 2025-03-24 RX ADMIN — FENTANYL CITRATE 50 MCG: 50 INJECTION INTRAMUSCULAR; INTRAVENOUS at 15:16

## 2025-03-24 RX ADMIN — INSULIN ASPART 1 UNITS: 100 INJECTION, SOLUTION INTRAVENOUS; SUBCUTANEOUS at 17:18

## 2025-03-24 RX ADMIN — HYDROMORPHONE HYDROCHLORIDE 0.2 MG: 0.2 INJECTION, SOLUTION INTRAMUSCULAR; INTRAVENOUS; SUBCUTANEOUS at 00:53

## 2025-03-24 RX ADMIN — VECURONIUM BROMIDE 10 MG: 10 INJECTION, POWDER, LYOPHILIZED, FOR SOLUTION INTRAVENOUS at 01:32

## 2025-03-24 RX ADMIN — CHLORHEXIDINE GLUCONATE 15 ML: 1.2 RINSE ORAL at 08:35

## 2025-03-24 RX ADMIN — WATER 10 ML: 1 INJECTION INTRAMUSCULAR; INTRAVENOUS; SUBCUTANEOUS at 01:33

## 2025-03-24 RX ADMIN — LIDOCAINE HYDROCHLORIDE 10 ML: 10 INJECTION, SOLUTION EPIDURAL; INFILTRATION; INTRACAUDAL; PERINEURAL at 01:32

## 2025-03-24 RX ADMIN — INSULIN ASPART 1 UNITS: 100 INJECTION, SOLUTION INTRAVENOUS; SUBCUTANEOUS at 20:06

## 2025-03-24 RX ADMIN — LIDOCAINE HYDROCHLORIDE 10 ML: 10 INJECTION, SOLUTION EPIDURAL; INFILTRATION; INTRACAUDAL; PERINEURAL at 15:17

## 2025-03-24 RX ADMIN — AMPICILLIN SODIUM AND SULBACTAM SODIUM 3 G: 2; 1 INJECTION, POWDER, FOR SOLUTION INTRAMUSCULAR; INTRAVENOUS at 22:02

## 2025-03-24 RX ADMIN — CHLORHEXIDINE GLUCONATE 15 ML: 1.2 RINSE ORAL at 20:01

## 2025-03-24 RX ADMIN — Medication 40 MG: at 08:35

## 2025-03-24 RX ADMIN — IPRATROPIUM BROMIDE AND ALBUTEROL SULFATE 3 ML: .5; 3 SOLUTION RESPIRATORY (INHALATION) at 12:13

## 2025-03-24 RX ADMIN — ACETYLCYSTEINE 2 ML: 200 SOLUTION ORAL; RESPIRATORY (INHALATION) at 00:21

## 2025-03-24 RX ADMIN — IPRATROPIUM BROMIDE AND ALBUTEROL SULFATE 3 ML: .5; 3 SOLUTION RESPIRATORY (INHALATION) at 00:21

## 2025-03-24 RX ADMIN — SODIUM CHLORIDE: 0.9 INJECTION, SOLUTION INTRAVENOUS at 20:01

## 2025-03-24 RX ADMIN — ACETAMINOPHEN 650 MG: 325 TABLET ORAL at 00:42

## 2025-03-24 RX ADMIN — MIDAZOLAM 5 MG: 1 INJECTION INTRAMUSCULAR; INTRAVENOUS at 01:32

## 2025-03-24 RX ADMIN — ACETYLCYSTEINE 2 ML: 200 SOLUTION ORAL; RESPIRATORY (INHALATION) at 19:26

## 2025-03-24 RX ADMIN — BUDESONIDE 0.25 MG: 0.25 INHALANT RESPIRATORY (INHALATION) at 21:31

## 2025-03-24 RX ADMIN — IPRATROPIUM BROMIDE AND ALBUTEROL SULFATE 3 ML: .5; 3 SOLUTION RESPIRATORY (INHALATION) at 08:40

## 2025-03-24 RX ADMIN — MIDAZOLAM 0.5 MG: 1 INJECTION INTRAMUSCULAR; INTRAVENOUS at 15:15

## 2025-03-24 RX ADMIN — IPRATROPIUM BROMIDE AND ALBUTEROL SULFATE 3 ML: .5; 3 SOLUTION RESPIRATORY (INHALATION) at 19:26

## 2025-03-24 RX ADMIN — ACETYLCYSTEINE 2 ML: 200 SOLUTION ORAL; RESPIRATORY (INHALATION) at 08:40

## 2025-03-24 RX ADMIN — ACETYLCYSTEINE 2 ML: 200 SOLUTION ORAL; RESPIRATORY (INHALATION) at 12:14

## 2025-03-24 RX ADMIN — METHYLPREDNISOLONE SODIUM SUCCINATE 40 MG: 40 INJECTION, POWDER, FOR SOLUTION INTRAMUSCULAR; INTRAVENOUS at 12:25

## 2025-03-24 RX ADMIN — AMPICILLIN SODIUM AND SULBACTAM SODIUM 3 G: 2; 1 INJECTION, POWDER, FOR SOLUTION INTRAMUSCULAR; INTRAVENOUS at 17:15

## 2025-03-24 RX ADMIN — ENOXAPARIN SODIUM 40 MG: 40 INJECTION SUBCUTANEOUS at 12:25

## 2025-03-24 ASSESSMENT — ACTIVITIES OF DAILY LIVING (ADL)
ADLS_ACUITY_SCORE: 85
ADLS_ACUITY_SCORE: 79
ADLS_ACUITY_SCORE: 83
ADLS_ACUITY_SCORE: 85
ADLS_ACUITY_SCORE: 79
ADLS_ACUITY_SCORE: 85
ADLS_ACUITY_SCORE: 79
ADLS_ACUITY_SCORE: 83
ADLS_ACUITY_SCORE: 79

## 2025-03-24 NOTE — PROGRESS NOTES
[facility] ICU RESPIRATORY NOTE        Date of Admission: 3/18/2025    Date of Intubation (most recent): 3/18/25    Reason for Mechanical Ventilation: respiratory failure.     Number of Days on Mechanical Ventilation: 8    Met Criteria for Spontaneous Breathing Trial: No    Reason for No Spontaneous Breathing Trial: PEEP [id number], FiO2 %    Bite Block: Yes; please explain: biting. Green bite block in place.     Significant Events Today: Bronchiospiy overnight. Another bronch performed in the afternoon by Dr. Fuller & Dr. Schwartz. ETT was advanced to 26cm at teeth.     ABG Results:   Recent Labs   Lab 03/24/25  0901 03/24/25  0425 03/23/25  0434 03/22/25  1043 03/19/25  0028 03/18/25  2202 03/18/25  2142 03/18/25  1821   PH  --   --   --   --   --  7.39  --  7.30*   PCO2  --   --   --   --   --  80*  --  87*   PO2  --   --   --   --   --  70*  --  57*   HCO3  --   --   --   --   --  48*  --  42*   O2PER 100 100 50 60   < > 70   < >  --     < > = values in this interval not displayed.         Current Vent Settings: FiO2 (%): 90 %, Resp: 15, Vent Mode: CMV/AC, Resp Rate (Set): 20 breaths/min, Tidal Volume (Set, mL): 470 mL, PEEP (cm H2O): 10 cmH2O, Pressure Support (cm H2O): 10 cmH2O, Resp Rate (Set): 20 breaths/min, Tidal Volume (Set, mL): 470 mL, PEEP (cm H2O): 10 cmH2O    Skin Assessment: There is a tongue injury at the tip on there tongue.     Plan: Wean FiO2 & PEEP as tolerated.     Miguel Bowser, RT on 3/24/2025 at 5:10 PM

## 2025-03-24 NOTE — PROCEDURES
Procedure note    Bronchoscopy with therapeutic aspiration of left mainstem bronchus    Consent: Not obtained given emergent nature of procedure  Indications: Acute desaturation despite FiO2 100% on the vent, left mainstem occlusion by mucus plug on CXR  Contra-indications: none  Sedation: Versed 5 mg IV x1  Premedication: Vecuronium 10 mg IV x1   Anesthesia: Lidocaine 1%, 10 ml x1 via ETT    Procedure details:   Patient was positioned in semi-supine position, medications were given per above, bronchoscope was introduced via ETT into the lower trachea which appeared to be of normal caliber, gregorio was sharp, left mainstem bronchus was obstructed with mucus plug which was suctioned, multiple segmental plugs were then flushed with saline and suctioned from the left lung. All lobes of the left lung were examined to the subsegmental level, without endobronchial obstruction, intrinsic narrowing or extrinsic compression. We then examined the right lung, RUL and RLL segmental mucus plugs were suctioned, all lobes of the right lung were examined to the subsegmental level, without endobronchial obstruction, intrinsic narrowing or extrinsic compression. The patient tolerated the procedure well and there were no immediate complications.     EBL < 5 ml   Iris Perez MD

## 2025-03-24 NOTE — PROGRESS NOTES
Alerted by nursing this evening of result of right arm ultrasound performed due to forearm edema.  Ultrasound showed no DVT but finding of superficial occlusive thrombus of cephalic vein.  On exam has edema of forearm up to elbow but no edema above elbow, upper arm has no edema.  No erythema or warmth of arm.  Ok to continue with PICC, plan to elevate arm.    Saumya Og MD

## 2025-03-24 NOTE — PLAN OF CARE
Problem: Mechanical Ventilation Invasive  Goal: Mechanical Ventilation Liberation  Intervention: Promote Extubation and Mechanical Ventilation Liberation  Recent Flowsheet Documentation  Taken 3/24/2025 1600 by Caroline Dixon RN  Sleep/Rest Enhancement:   comfort measures   relaxation techniques promoted  Medication Review/Management:   medications reviewed   high-risk medications identified  Environmental Support: calm environment promoted  Taken 3/24/2025 1200 by Caroline Dixon RN  Sleep/Rest Enhancement:   comfort measures   relaxation techniques promoted  Medication Review/Management:   medications reviewed   high-risk medications identified  Environmental Support: calm environment promoted  Taken 3/24/2025 0800 by Caroline Dixon RN  Sleep/Rest Enhancement:   comfort measures   relaxation techniques promoted  Medication Review/Management:   medications reviewed   high-risk medications identified  Environmental Support: calm environment promoted     Problem: Mechanical Ventilation Invasive  Goal: Optimal Nutrition Delivery  Intervention: Optimize Nutrition Delivery  Recent Flowsheet Documentation  Taken 3/24/2025 1600 by Caroline Dixon RN  Nutrition Support Management: weight trending reviewed  Taken 3/24/2025 1200 by Caroline Dixon RN  Nutrition Support Management: weight trending reviewed  Taken 3/24/2025 0800 by Caroline Dixon RN  Nutrition Support Management: weight trending reviewed   Goal Outcome Evaluation:      Plan of Care Reviewed With: patient    Overall Patient Progress: no changeOverall Patient Progress: no change    Outcome Evaluation: Pt remains vented, no sedation. Does not track, does not follow. Rass -4 to -5, only movement notable is response to stimuli in BLE. FiO2 demands increased, CXR and bronch completed, FiO2 60% wean as tolerated. NPO on TF, TF restarted at 25ml/hr. Blount D/c'd, external cath in place. 1 BM. Family updated via phone.

## 2025-03-24 NOTE — PLAN OF CARE
"  Problem: Adult Inpatient Plan of Care  Goal: Plan of Care Review  Description: The Plan of Care Review/Shift note should be completed every shift.  The Outcome Evaluation is a brief statement about your assessment that the patient is improving, declining, or no change.  This information will be displayed automatically on your shift  note.  Outcome: Not Progressing  Flowsheets (Taken 3/23/2025 1902)  Outcome Evaluation: No changes to neuros. PST for 3 hrs today, Pt having muscus plugging, lavage done and mucomyst nebs ordered. FiO2 90%. Rt forearm warm and swollen. PIV d/c'd and US done. Shows occlusive superficial thormbuphelbitis. Family updated via phone today. VSS.  Plan of Care Reviewed With:   patient   sibling  Goal: Patient-Specific Goal (Individualized)  Description: You can add care plan individualizations to a care plan. Examples of Individualization might be:  \"Parent requests to be called daily at 9am for status\", \"I have a hard time hearing out of my right ear\", or \"Do not touch me to wake me up as it startles  me\".  Outcome: Not Progressing  Goal: Absence of Hospital-Acquired Illness or Injury  Outcome: Not Progressing  Intervention: Identify and Manage Fall Risk  Recent Flowsheet Documentation  Taken 3/23/2025 1800 by Doris Dumont, RN  Safety Promotion/Fall Prevention:   clutter free environment maintained   room door open   room near nurse's station   room organization consistent   safety round/check completed  Taken 3/23/2025 1600 by Doris Dumont, RN  Safety Promotion/Fall Prevention:   clutter free environment maintained   room door open   room near nurse's station   room organization consistent   safety round/check completed  Taken 3/23/2025 1400 by Doris Dumont, RN  Safety Promotion/Fall Prevention:   clutter free environment maintained   room door open   room near nurse's station   room organization consistent   safety round/check completed  Taken 3/23/2025 1200 by Tim, " Doris Alize, RN  Safety Promotion/Fall Prevention:   clutter free environment maintained   room door open   room near nurse's station   room organization consistent   safety round/check completed  Taken 3/23/2025 1000 by Doris Dumont RN  Safety Promotion/Fall Prevention:   clutter free environment maintained   room door open   room near nurse's station   room organization consistent   safety round/check completed  Taken 3/23/2025 0800 by Doris Dumont RN  Safety Promotion/Fall Prevention:   clutter free environment maintained   room door open   room near nurse's station   room organization consistent   safety round/check completed  Intervention: Prevent Skin Injury  Recent Flowsheet Documentation  Taken 3/23/2025 1800 by Doris Dumont RN  Body Position:   turned   heels elevated   upper extremity elevated  Taken 3/23/2025 1600 by Doris Dumont RN  Body Position:   turned   heels elevated   upper extremity elevated  Skin Protection:   adhesive use limited   incontinence pads utilized   pulse oximeter probe site changed   silicone foam dressing in place   skin to device areas padded   transparent dressing maintained   tubing/devices free from skin contact  Taken 3/23/2025 1400 by oDris Dumont RN  Body Position:   turned   heels elevated   upper extremity elevated  Taken 3/23/2025 1200 by Doris Dumont RN  Body Position:   turned   heels elevated   upper extremity elevated  Skin Protection:   adhesive use limited   incontinence pads utilized   pulse oximeter probe site changed   silicone foam dressing in place   skin to device areas padded   transparent dressing maintained   tubing/devices free from skin contact  Taken 3/23/2025 1000 by Doris Dumont RN  Body Position:   turned   heels elevated   upper extremity elevated  Taken 3/23/2025 0800 by Doris Dumont RN  Body Position:   turned   heels elevated   upper extremity elevated  Skin Protection:   adhesive use limited    incontinence pads utilized   pulse oximeter probe site changed   silicone foam dressing in place   skin to device areas padded   transparent dressing maintained   tubing/devices free from skin contact  Intervention: Prevent and Manage VTE (Venous Thromboembolism) Risk  Recent Flowsheet Documentation  Taken 3/23/2025 1600 by Doris Dumont RN  VTE Prevention/Management: SCDs on (sequential compression devices)  Taken 3/23/2025 1200 by Doris Dumont RN  VTE Prevention/Management: SCDs on (sequential compression devices)  Taken 3/23/2025 0800 by Doris Dumont RN  VTE Prevention/Management: SCDs on (sequential compression devices)  Goal: Optimal Comfort and Wellbeing  Outcome: Not Progressing  Intervention: Monitor Pain and Promote Comfort  Recent Flowsheet Documentation  Taken 3/23/2025 1600 by Doris Dumont RN  Pain Management Interventions:   repositioned   rest  Taken 3/23/2025 1200 by Doris Dumont RN  Pain Management Interventions:   repositioned   rest  Taken 3/23/2025 0800 by Doris Dumont RN  Pain Management Interventions:   repositioned   rest  Intervention: Provide Person-Centered Care  Recent Flowsheet Documentation  Taken 3/23/2025 1600 by Doris Dumont RN  Trust Relationship/Rapport:   care explained   reassurance provided  Taken 3/23/2025 1200 by Doris Dumont RN  Trust Relationship/Rapport:   care explained   reassurance provided  Taken 3/23/2025 0800 by Doris Dumont RN  Trust Relationship/Rapport:   care explained   reassurance provided  Goal: Readiness for Transition of Care  Outcome: Not Progressing     Problem: Mechanical Ventilation Invasive  Goal: Effective Communication  Outcome: Not Progressing  Intervention: Ensure Effective Communication  Recent Flowsheet Documentation  Taken 3/23/2025 1600 by Doris Dumont RN  Trust Relationship/Rapport:   care explained   reassurance provided  Taken 3/23/2025 1200 by Doris Dumont RN  Trust  Relationship/Rapport:   care explained   reassurance provided  Taken 3/23/2025 0800 by Doris Dumont RN  Trust Relationship/Rapport:   care explained   reassurance provided  Goal: Optimal Device Function  Outcome: Not Progressing  Intervention: Optimize Device Care and Function  Recent Flowsheet Documentation  Taken 3/23/2025 1800 by Doris Dumont RN  Oral Care:   swabbed with antiseptic solution   suction provided   teeth brushed  Taken 3/23/2025 1600 by Doris Dumont RN  Oral Care:   swabbed with antiseptic solution   suction provided   teeth brushed  Taken 3/23/2025 1400 by Doris Dumont RN  Oral Care:   swabbed with antiseptic solution   suction provided   teeth brushed  Taken 3/23/2025 1200 by Doris Dumont RN  Oral Care:   swabbed with antiseptic solution   suction provided   teeth brushed  Taken 3/23/2025 1000 by Doris Dumont RN  Oral Care:   swabbed with antiseptic solution   suction provided   teeth brushed  Taken 3/23/2025 0800 by Doris Dumont RN  Oral Care:   swabbed with antiseptic solution   suction provided   teeth brushed  Goal: Mechanical Ventilation Liberation  Outcome: Not Progressing  Intervention: Promote Extubation and Mechanical Ventilation Liberation  Recent Flowsheet Documentation  Taken 3/23/2025 1800 by Doris Dumont RN  Medication Review/Management:   medications reviewed   high-risk medications identified  Taken 3/23/2025 1600 by Doris Dumont RN  Sleep/Rest Enhancement:   comfort measures   relaxation techniques promoted  Medication Review/Management:   medications reviewed   high-risk medications identified  Environmental Support: calm environment promoted  Taken 3/23/2025 1400 by Doris Dumont RN  Medication Review/Management:   medications reviewed   high-risk medications identified  Taken 3/23/2025 1200 by Doris Dumont RN  Sleep/Rest Enhancement:   comfort measures   relaxation techniques promoted  Medication  Review/Management:   medications reviewed   high-risk medications identified  Environmental Support: calm environment promoted  Taken 3/23/2025 1000 by Doris Dumont RN  Medication Review/Management:   medications reviewed   high-risk medications identified  Taken 3/23/2025 0800 by Doris Dumont, RN  Sleep/Rest Enhancement:   comfort measures   relaxation techniques promoted  Medication Review/Management:   medications reviewed   high-risk medications identified  Environmental Support: calm environment promoted  Goal: Optimal Nutrition Delivery  Outcome: Not Progressing  Intervention: Optimize Nutrition Delivery  Recent Flowsheet Documentation  Taken 3/23/2025 1600 by Doris Dumont RN  Nutrition Support Management: weight trending reviewed  Taken 3/23/2025 1200 by Doris Dumont, RN  Nutrition Support Management: weight trending reviewed  Taken 3/23/2025 0800 by Doris Dumont RN  Nutrition Support Management: weight trending reviewed  Goal: Absence of Device-Related Skin and Tissue Injury  Outcome: Not Progressing  Intervention: Maintain Skin and Tissue Health  Recent Flowsheet Documentation  Taken 3/23/2025 1600 by Doris Dumont RN  Device Skin Pressure Protection:   absorbent pad utilized/changed   adhesive use limited   positioning supports utilized   pressure points protected   skin-to-device areas padded   tubing/devices free from skin contact  Taken 3/23/2025 1200 by Doris Dumont RN  Device Skin Pressure Protection:   absorbent pad utilized/changed   adhesive use limited   positioning supports utilized   pressure points protected   skin-to-device areas padded   tubing/devices free from skin contact  Taken 3/23/2025 0800 by Doris Dumont RN  Device Skin Pressure Protection:   absorbent pad utilized/changed   adhesive use limited   positioning supports utilized   pressure points protected   skin-to-device areas padded   tubing/devices free from skin contact  Goal:  Absence of Ventilator-Induced Lung Injury  Outcome: Not Progressing  Intervention: Prevent Ventilator-Associated Pneumonia  Recent Flowsheet Documentation  Taken 3/23/2025 1800 by Doris Dumont RN  Oral Care:   swabbed with antiseptic solution   suction provided   teeth brushed  Taken 3/23/2025 1600 by Doris Dumont RN  VAP Prevention Bundle:   HOB elevation maintained   oral care regularly provided   readiness to extubate assessed   stress ulcer prophylaxis provided   vent circuit breaks minimized   VTE prophylaxis provided  Oral Care:   swabbed with antiseptic solution   suction provided   teeth brushed  Head of Bed (HOB) Positioning: HOB at 30 degrees  VAP Prevention Contraindications: no/minimum sedation required  VAP Prevention Measures: completed  Taken 3/23/2025 1400 by Doris Dumont RN  Oral Care:   swabbed with antiseptic solution   suction provided   teeth brushed  Taken 3/23/2025 1200 by Doris Dumont RN  VAP Prevention Bundle:   HOB elevation maintained   oral care regularly provided   readiness to extubate assessed   stress ulcer prophylaxis provided   vent circuit breaks minimized   VTE prophylaxis provided  Oral Care:   swabbed with antiseptic solution   suction provided   teeth brushed  Head of Bed (HOB) Positioning: HOB at 30 degrees  VAP Prevention Contraindications: no/minimum sedation required  VAP Prevention Measures: completed  Taken 3/23/2025 1000 by Doris Dumont RN  Oral Care:   swabbed with antiseptic solution   suction provided   teeth brushed  Taken 3/23/2025 0800 by Doris Dumont RN  VAP Prevention Bundle:   HOB elevation maintained   oral care regularly provided   readiness to extubate assessed   stress ulcer prophylaxis provided   vent circuit breaks minimized   VTE prophylaxis provided  Oral Care:   swabbed with antiseptic solution   suction provided   teeth brushed  Head of Bed (HOB) Positioning: HOB at 30 degrees  VAP Prevention Contraindications:  no/minimum sedation required  VAP Prevention Measures: completed     Problem: Comorbidity Management  Goal: Maintenance of Heart Failure Symptom Control  Outcome: Not Progressing  Intervention: Maintain Heart Failure Management  Recent Flowsheet Documentation  Taken 3/23/2025 1800 by Doris Dumont RN  Medication Review/Management:   medications reviewed   high-risk medications identified  Taken 3/23/2025 1600 by Doris Dumont RN  Medication Review/Management:   medications reviewed   high-risk medications identified  Taken 3/23/2025 1400 by Doris Dumont RN  Medication Review/Management:   medications reviewed   high-risk medications identified  Taken 3/23/2025 1200 by Doris Dumont RN  Medication Review/Management:   medications reviewed   high-risk medications identified  Taken 3/23/2025 1000 by Doris Dumont RN  Medication Review/Management:   medications reviewed   high-risk medications identified  Taken 3/23/2025 0800 by Doris Dumont RN  Medication Review/Management:   medications reviewed   high-risk medications identified     Problem: Risk for Delirium  Goal: Optimal Coping  Outcome: Not Progressing  Intervention: Optimize Psychosocial Adjustment to Delirium  Recent Flowsheet Documentation  Taken 3/23/2025 1600 by Doris Dumont RN  Supportive Measures: relaxation techniques promoted  Taken 3/23/2025 1200 by Doris Dumont RN  Supportive Measures: relaxation techniques promoted  Taken 3/23/2025 0800 by Doris Dumont RN  Supportive Measures: relaxation techniques promoted  Goal: Improved Behavioral Control  Outcome: Not Progressing  Intervention: Prevent and Manage Agitation  Recent Flowsheet Documentation  Taken 3/23/2025 1600 by Doris Dumont RN  Environment Familiarity/Consistency: daily routine followed  Taken 3/23/2025 1200 by Doris Dumont RN  Environment Familiarity/Consistency: daily routine followed  Taken 3/23/2025 0800 by Doris Dumont  PATY Herrmann  Environment Familiarity/Consistency: daily routine followed  Intervention: Minimize Safety Risk  Recent Flowsheet Documentation  Taken 3/23/2025 1800 by Doris Dumont RN  Enhanced Safety Measures:   review medications for side effects with activity   room near unit station  Taken 3/23/2025 1600 by Doris Dumont RN  Enhanced Safety Measures:   review medications for side effects with activity   room near unit station  Trust Relationship/Rapport:   care explained   reassurance provided  Taken 3/23/2025 1400 by Doris Dumont RN  Enhanced Safety Measures:   review medications for side effects with activity   room near unit station  Taken 3/23/2025 1200 by Doris Dumont RN  Enhanced Safety Measures:   review medications for side effects with activity   room near unit station  Trust Relationship/Rapport:   care explained   reassurance provided  Taken 3/23/2025 1000 by Doris Dumont RN  Enhanced Safety Measures:   review medications for side effects with activity   room near unit station  Taken 3/23/2025 0800 by Doris Dumont RN  Enhanced Safety Measures:   review medications for side effects with activity   room near unit station  Trust Relationship/Rapport:   care explained   reassurance provided  Goal: Improved Attention and Thought Clarity  Outcome: Not Progressing  Intervention: Maximize Cognitive Function  Recent Flowsheet Documentation  Taken 3/23/2025 1600 by Doris Dumont RN  Sensory Stimulation Regulation:   care clustered   lighting decreased   television on  Reorientation Measures:   clock in view   reorientation provided  Taken 3/23/2025 1200 by Doris Dumont RN  Sensory Stimulation Regulation:   care clustered   lighting decreased   television on  Reorientation Measures:   clock in view   reorientation provided  Taken 3/23/2025 0800 by Doris Dumont RN  Sensory Stimulation Regulation:   care clustered   lighting decreased   television  on  Reorientation Measures:   clock in view   reorientation provided  Goal: Improved Sleep  Outcome: Not Progressing  Intervention: Promote Sleep  Recent Flowsheet Documentation  Taken 3/23/2025 1600 by Doris Dumont RN  Sleep/Rest Enhancement:   comfort measures   relaxation techniques promoted  Taken 3/23/2025 1200 by Doris Dumont RN  Sleep/Rest Enhancement:   comfort measures   relaxation techniques promoted  Taken 3/23/2025 0800 by Doris Dumont RN  Sleep/Rest Enhancement:   comfort measures   relaxation techniques promoted     Problem: Skin Injury Risk Increased  Goal: Skin Health and Integrity  Outcome: Not Progressing  Intervention: Plan: Nurse Driven Intervention: Positioning  Recent Flowsheet Documentation  Taken 3/23/2025 1600 by Doris Dumont RN  Plan: Positioning Interventions:   REPOSITION Left/Right (No supine) q2h   Use wedge positioners   HOB 30 degrees or less   OFF-LOAD HEELS with pillows  Taken 3/23/2025 1200 by Doris Dumont RN  Plan: Positioning Interventions:   REPOSITION Left/Right (No supine) q2h   Use wedge positioners   HOB 30 degrees or less   OFF-LOAD HEELS with pillows  Taken 3/23/2025 0800 by Doris Dumont RN  Plan: Positioning Interventions:   REPOSITION Left/Right (No supine) q2h   Use wedge positioners   HOB 30 degrees or less   OFF-LOAD HEELS with pillows  Intervention: Plan: Nurse Driven Intervention: Moisture Management  Recent Flowsheet Documentation  Taken 3/23/2025 0800 by Doris Dumont RN  Moisture Interventions:   Incontinence pad   Urinary collection device  Intervention: Plan: Nurse Driven Intervention: Friction and Shear  Recent Flowsheet Documentation  Taken 3/23/2025 0800 by Doris Dumont RN  Friction/Shear Interventions:   HOB 30 degrees or less   Silicone foam sacral dressing   Lateral transfer device (hovermat, etc.)   Repositioning device (TAP system, etc.)  Intervention: Optimize Skin Protection  Recent Flowsheet  Documentation  Taken 3/23/2025 1600 by Doris Dumont, RN  Skin Protection:   adhesive use limited   incontinence pads utilized   pulse oximeter probe site changed   silicone foam dressing in place   skin to device areas padded   transparent dressing maintained   tubing/devices free from skin contact  Activity Management: bedrest  Head of Bed (HOB) Positioning: HOB at 30 degrees  Taken 3/23/2025 1200 by Doris Dumont, RN  Skin Protection:   adhesive use limited   incontinence pads utilized   pulse oximeter probe site changed   silicone foam dressing in place   skin to device areas padded   transparent dressing maintained   tubing/devices free from skin contact  Activity Management: bedrest  Head of Bed (HOB) Positioning: HOB at 30 degrees  Taken 3/23/2025 0800 by Doris Dumont, RN  Skin Protection:   adhesive use limited   incontinence pads utilized   pulse oximeter probe site changed   silicone foam dressing in place   skin to device areas padded   transparent dressing maintained   tubing/devices free from skin contact  Activity Management: bedrest  Head of Bed (HOB) Positioning: HOB at 30 degrees   Goal Outcome Evaluation:      Plan of Care Reviewed With: patient, sibling    Overall Patient Progress: no changeOverall Patient Progress: no change    Outcome Evaluation: No changes to neuros. PST for 3 hrs today, Pt having muscus plugging, lavage done and mucomyst nebs ordered. FiO2 90%. Rt forearm warm and swollen. PIV d/c'd and US done. Shows occlusive superficial thormbuphelbitis. Family updated via phone today. VSS.

## 2025-03-24 NOTE — PROGRESS NOTES
Comprehensive Daily ICU Note        Miguel Rivera MRN# [id number]   Age: 59 year old YOB: 1965     Date of Admission: 3/18/2025    Primary care provider: No Ref-Primary, Physician     CODE STATUS: Full      Problem List:   Active Problems:    Anoxic brain damage (H)    Cardiopulmonary arrest (H)    Closed fracture of multiple ribs, unspecified laterality, initial encounter    Aspiration pneumonia (H)    Acute on chronic respiratory failure with hypoxia and hypercapnia (H)    COPD exacerbation (H)        Subjective/ Last 24 hours:   Events:   - Desaturation with mucus plugging and emergent bronchoscopy with therapeutic suctioning.   - Superficial occlusive thrombus of cephalic vein.       Physical Examination:       Temp:  [97  F (36.1  C)-100.8  F (38.2  C)] 97  F (36.1  C)  Pulse:  [66-97] 66  Resp:  [15-35] 17  BP: (116-152)/(69-91) 116/69  FiO2 (%):  [40 %-90 %] 90 %  SpO2:  [85 %-96 %] 91 %  General: Intubated, eyes closed   HEENT: ET and OG with intermittent thick ET tube secretions  Lungs: Synchronous with ventilator, decreased breath sounds at bilateral bases and in the left lung fields, no wheezing or rhonchi auscultated  CVS: Regular rate and rhythm, no murmur  Abdomen: Protruding abdomen, non-tender  Extremities/musculoskeletal/skin: More swollen right arm, very mild swelling on left arm. No lower extremity edema.  Neurology/Psychiatry: Does not open eyes spontaneous, pupils reactive, does not follow commands, and no spontaneous movements, twitches to pain in the lower extremities, does not withdraw on upper extremities  Exam of Line sites: PICC line with clean dressing, aforementioned swelling           Medications:     Current Facility-Administered Medications   Medication Dose Route Frequency Provider Last Rate Last Admin    acetylcysteine (MUCOMYST) 20 % nebulizer solution 2 mL  2 mL Nebulization Q6H Hilton Schwartz MD   2 mL at 03/24/25 0021    cefTRIAXone (ROCEPHIN) 2 g vial to  attach to  ml bag for ADULTS or NS 50 ml bag for PEDS  2 g Intravenous Q24H Hilton Schwartz MD   2 g at 03/23/25 1601    chlorhexidine (PERIDEX) 0.12 % solution 15 mL  15 mL Mouth/Throat Q12H Annette Taylor PA-C   15 mL at 03/23/25 2055    enoxaparin ANTICOAGULANT (LOVENOX) injection 40 mg  40 mg Subcutaneous Q24H Kendra Matt MD   40 mg at 03/23/25 1300    insulin aspart (NovoLOG) injection (RAPID ACTING)  1-7 Units Subcutaneous Q4H Kam Hutchins PA-C   1 Units at 03/20/25 0105    ipratropium - albuterol 0.5 mg/2.5 mg/3 mL (DUONEB) neb solution 3 mL  3 mL Nebulization Q6H Hilton Schwartz MD   3 mL at 03/24/25 0021    methylPREDNISolone sodium succinate (SOLU-MEDROL) injection 40 mg  40 mg Intravenous Q24H Kendra Matt MD   40 mg at 03/23/25 1300    pantoprazole (PROTONIX) 2 mg/mL suspension 40 mg  40 mg Per Feeding Tube QAM  Annette Taylor PA-C   40 mg at 03/21/25 0909    Or    pantoprazole (PROTONIX) IV push injection 40 mg  [facility]net[facility]   40 mg at 03/23/25 0732    sodium chloride (PF) 0.9% PF flush 10-40 mL  10-40 mL Intracatheter Q7 Days Adriel Meléndez MD   10 mL at 03/19/25 1103    sodium chloride (PF) 0.9% PF flush 10-40 mL  10-40 mL Intracatheter Daily Adriel Meléndez MD   20 mL at 03/23/25 1010        Current Facility-Administered Medications   Medication Dose Route Frequency Provider Last Rate Last Admin    sodium chloride 0.9 % infusion   Intravenous Continuous Hilton Schwartz MD 10 mL/hr at 03/23/25 2105 Rate Verify at 03/23/25 2105       Current Facility-Administered Medications   Medication Dose Route Frequency Provider Last Rate Last Admin    acetaminophen (TYLENOL) tablet 650 mg  650 mg Oral or Feeding Tube Q6H PRN Hilton Schwartz MD   650 mg at 03/24/25 0042    Or    acetaminophen (TYLENOL) oral liquid 650 mg  650 mg Per NG tube Q6H PRN Hilton Schwartz MD        albuterol  (PROVENTIL) neb solution 2.5 mg  2.5 mg Nebulization Q4H PRN Annette Taylor PA-C        glucose gel 15-30 g  15-30 g Oral Q15 Min PRN Kam Hutchins PA-C        Or    dextrose 50 % injection 25-50 mL  25-50 mL Intravenous Q15 Min PRN Kam Hutchins PA-C        Or    glucagon injection 1 mg  1 mg Subcutaneous Q15 Min PRN Kam Hutchins PA-C        HYDROmorphone (DILAUDID) injection 0.2 mg  0.2 mg Intravenous Q2H PRN Annetet Taylor PA-C   0.2 mg at 03/24/25 0053    [Held by provider] midazolam (VERSED) injection 4 mg  4 mg Intravenous Q1H PRN Mir Jacobson MD   4 mg at 03/18/25 1924    naloxone (NARCAN) injection 0.2 mg  0.2 mg Intravenous Q2 Min PRN John Ahuja RPH        Or    naloxone (NARCAN) injection 0.4 mg  0.4 mg Intravenous Q2 Min PRN John Ahuja RPH        Or    naloxone (NARCAN) injection 0.2 mg  0.2 mg Intramuscular Q2 Min PRN John Ahuja RPH        Or    naloxone (NARCAN) injection 0.4 mg  0.4 mg Intramuscular Q2 Min PRN John Ahuja RPH        ondansetron (ZOFRAN ODT) ODT tab 4 mg  4 mg Oral Q6H PRN Annette Taylor PA-C        Or    ondansetron (ZOFRAN) injection 4 mg  4 mg Intravenous Q6H PRN Annette Taylor PA-C        polyethylene glycol (MIRALAX) Packet 17 g  17 g Oral Daily PRN Annette Taylor PA-C        senna-docusate (SENOKOT-S/PERICOLACE) 8.6-50 MG per tablet 1 tablet  1 tablet Oral BID PRN Annette Taylor PA-C        Or    senna-docusate (SENOKOT-S/PERICOLACE) 8.6-50 MG per tablet 2 tablet  2 tablet Oral BID PRN Annette Taylor PA-C        sodium chloride (PF) 0.9% PF flush 10-20 mL  10-20 mL Intracatheter q1 min prn Adriel Meléndez MD             Imaging and Other Data:        XR Chest Port 1 View  Narrative: EXAM: XR CHEST PORT 1 VIEW  LOCATION: [facility]  DATE: 3/24/2025    INDICATION: hypoxia increased WOB  COMPARISON: 03/18/2025.   Impression: IMPRESSION: Endotracheal tube  terminates approximately 7 cm above the level of the gregorio. Right upper extremity PICC line with tip overlying the mid aspect of the SVC. Nasogastric tube courses below the diaphragm and into the stomach though the tip is   excluded by collimation. Esophageal temperature probe overlies the upper thoracic esophagus. Worsening near-complete opacification of the left hemithorax. This could be due to mucous plugging with associated volume loss, and there is of note a somewhat a   abrupt termination of the left mainstem bronchus and there is some leftward mediastinal shift. Suspect also a moderate to large left pleural effusion. Slightly aerated left upper lung zone with diffusely increased interstitial markings, may reflect   asymmetric edema. Small right pleural effusion. Unchanged osseous structures.             Assessment and Plan:     Summary:    59-year-old man with COPD, hypertension, kidney disease, and chronic hypercapnic and hypoxemic respiratory failure requiring home oxygen who was found unresponsive by his family with disconnected oxygen.  Unknown downtime (ast known well 1608 and found around 1625).  EMS found patient in asystole.  Had return of spontaneous circulation approximately 30 minutes after EMS arrival.  In the ED, patient was found to be severely hypercapnic and was intubated.. Admitted to ICU.  EEG showed severe encephalopathy.  Purulent secretions and hypoxic with airflow obstruction.  3/21, MRI consistent with severe anoxic encephalopathy.  EEG with very little spontaneous activity and patient unresponsive to painful stimuli.  Also being treat for strep species pneumonia.     Miguel Rivera is a 59 year old male admitted on 3/18/2025 for cardiac arrest.    I have personally reviewed the daily labs, imaging studies, cultures and discussed the case with referring physician and consulting physicians. My assessment and plan as follows:    Neurology and Psychiatry:  Assessment:   Current  Facility-Administered Medications   Medication Dose Route Frequency Provider Last Rate Last Admin    sodium chloride 0.9 % infusion   Intravenous Continuous Hilton Schwartz MD 10 mL/hr at 03/23/25 2105 Rate Verify at 03/23/25 2105     Severe anoxic encephalopathy  Prolonged cardiac arrest before ROSC (30+ minutes) with unclear time down (25 or less minutes) before EMS arrival. CO2 immeasurably high upon arrival leaving. Post arrest 72 hr MRI brain imaging shows diffuse axonal brain injury. Poor neuro exam. cEEG previously showed minimal brain activity and no seizures. Overall, poor prognosis with no expectation for meaningful neurologic recovery and family discussions around code status and goals of care are ongoing.    Plan   See Dr. Schwartz's note from 3/23 on family discussions.  For now, continue full code, plan will be to assess for any improvement over the next 7 days while family continues to talk amongst themselves to form consensus (the ideal scenario).  In case an unideal scenerio must be pursued, we will clarify with the legal department via case management if [place] state law allows decision making by one or majority of siblings instead of full consensus and if any additional process may need to occur (such court order designation of MADELEINE/POA).    Cardiovascular and Hemodynamics:  Assessment:  Temp:  [97  F (36.1  C)-100.8  F (38.2  C)] 97  F (36.1  C)  Pulse:  [66-97] 66  Resp:  [15-35] 17  BP: (116-152)/(69-91) 116/69  FiO2 (%):  [40 %-90 %] 90 %  SpO2:  [85 %-96 %] 91 %       Intake/Output Summary (Last 24 hours) at 3/24/2025 0826  Last data filed at 3/24/2025 0600  Gross per 24 hour   Intake 988 ml   Output 2010 ml   Net -1022 ml     Echo result w/o MOPS: Interpretation Summary The left ventricle is normal in size.Left ventricular systolic function is normal.The visual ejection fraction is 55-60%.Normal left ventricular wall motionThe right ventricle is normal in structure, function and size.Mild  valvular aortic stenosis. The mean AoV pressure gradient is 13mmHg.The rhythm was atrial fibrillation.There is no comparison study available.    Vasoplegic/cardiogenic shock, resolved  Asystolic arrest.  From history respiratory as a primary source seems most likely.  EKG not consistent with STEMI.  Troponin minimally elevated  Medical problems include chronic hypoxic/hypercapneic respiratory failure and NIV usage, hypertension, dyslipidemia and atrial fibrillation and previous diagnosis of diastolic heart failure/PH and tricuspid regurgitation (the latter two issues appear improved on recent TTE) .  Blood pressures now trending higher without need for vasopressor medications.  Ectopy that has resolved.   5.   Superficial cephalic vein thrombosis at PICC site.    Plan  Monitor hemodynamics closely.  Initiate antihypertensive if necessary.  Elevate PICC site. Monitor for worsening signs/symptoms and remove if necessary, although access required at this time.    Pulmonary/ID:  Assessment:  FiO2 (%): 90 %, Resp: 21, Vent Mode: CMV/AC, Resp Rate (Set): 16 breaths/min, Tidal Volume (Set, mL): 450 mL, PEEP (cm H2O): 12 cmH2O, Pressure Support (cm H2O): 10 cmH2O, Resp Rate (Set): 16 breaths/min, Tidal Volume (Set, mL): 450 mL, PEEP (cm H2O): 12 cmH2O   Arterial Blood Gas  Recent Labs   Lab 03/24/25  0425 03/23/25  0434 03/22/25  1043 03/20/25  1302 03/19/25  0028 03/18/25  2202 03/18/25  2142 03/18/25  1821   PH  --   --   --   --   --  7.39  --  7.30*   PCO2  --   --   --   --   --  80*  --  87*   PO2  --   --   --   --   --  70*  --  57*   HCO3  --   --   --   --   --  48*  --  42*   O2PER 100 50 60 60   < > 70   < >  --     < > = values in this interval not displayed.     pH Venous   Date Value Ref Range Status   03/24/2025 7.33 7.32 - 7.43 Final   03/23/2025 7.42 7.32 - 7.43 Final   03/22/2025 7.41 7.32 - 7.43 Final     pCO2 Venous   Date Value Ref Range Status   03/24/2025 62 (H) 40 - 50 mm Hg Final   03/23/2025 49 57  - 50 mm Hg Final   03/22/2025 54 (H) 40 - 50 mm Hg Final       Acute on chronic hypoxic and hypercapnic respiratory failure with home oxygen administration failure.  Aspiration pneumonia: Low-grade fever, leukocytosis, purulent secretions, Streptococcus pseudopneumoniae growing in sputum.  On ceftriaxone since 3/22 and was on Zosyn 3/20-3/22.  Mucus plugging, s/p emergent bronchoscopy on 3/24 AM.  Moderate to large left pleural effusion, improved on 3/24 CXR.    Plan:  Narrowed antibiotics from Zosyn to ceftriaxone on 3/22.  Continue bronchodilators and high steroids; added mucomyst to help mucus plugging.  Continue current ventilator settings with plan to wean PEEP/FiO2 (increased to 12 and 80% overnight following bronch due to complete opacification of left hemithorax)  VAP precautions: HOB 30, chlorhexidine  5.   Check daily VBGs: PvCO2 today significantly improved into compensated level  6.   Repeat bronchoscopy today.    GI and Nutrition:  Assessment:  AST   Date Value Ref Range Status   03/24/2025 47 (H) 0 - 45 U/L Final   03/23/2025 67 (H) 0 - 45 U/L Final   03/20/2025 60 (H) 0 - 45 U/L Final     ALT   Date Value Ref Range Status   03/24/2025 16 0 - 70 U/L Final   03/23/2025 17 0 - 70 U/L Final   03/20/2025 30 0 - 70 U/L Final     Bilirubin Total   Date Value Ref Range Status   03/24/2025 0.2 <=1.2 mg/dL Final   03/23/2025 0.2 <=1.2 mg/dL Final   03/20/2025 0.2 <=1.2 mg/dL Final     Protein Total   Date Value Ref Range Status   03/24/2025 6.3 (L) 6.4 - 8.3 g/dL Final   03/23/2025 6.3 (L) 6.4 - 8.3 g/dL Final   03/20/2025 6.1 (L) 6.4 - 8.3 g/dL Final     Albumin   Date Value Ref Range Status   03/24/2025 3.1 (L) 3.5 - 5.2 g/dL Final   03/23/2025 3.2 (L) 3.5 - 5.2 g/dL Final   03/20/2025 2.9 (L) 3.5 - 5.2 g/dL Final     Alkaline Phosphatase   Date Value Ref Range Status   03/24/2025 63 40 - 150 U/L Final   03/23/2025 65 40 - 150 U/L Final   03/20/2025 91 40 - 150 U/L Final     Transaminitis likely secondary to  arrest given elevated lactic acid.  Improving.   Nutritional Plan: Tube feeds    Plan:   Monitor   Continue tube feeds  Stress ulcer: PPI    Renal, Fluid and Electrolytes:  Assessment:  BMP  Recent Labs   Lab 03/24/25  0558 03/24/25  0425 03/23/25  0434 03/22/25  0427 03/22/25  0116 03/21/25  0413 03/20/25  0441   *  --  134* 137  --   --  143   POTASSIUM 4.8  --  4.5 4.5  4.4  --  4.6 3.7   CHLORIDE 96*  --  97* 99  --   --  95*   CO2 30*  --  29 29  --   --  45*   ANIONGAP 7  --  8 9  --   --  3*   BUN 22.5  --  20.0 25.4*  --   --  30.5*   CR 0.56*  --  0.64* 0.69 0.71  --  0.76   GFRESTIMATED >90  --  >90 >90 >90  --  >90   LOLA 8.4*  --  8.3* 8.4*  --   --  8.2*   MAG  --  2.1 1.7 1.8  --  2.2 2.1   PHOS  --  3.7 3.1 1.9*  --  2.9 2.4*     Lactic Acid:  Lactic Acid   Date Value Ref Range Status   03/20/2025 0.8 0.7 - 2.0 mmol/L Final   03/19/2025 0.7 0.7 - 2.0 mmol/L Final   03/19/2025 2.4 (H) 0.7 - 2.0 mmol/L Final     Intake/Output Summary (Last 24 hours) at 3/24/2025 0836  Last data filed at 3/24/2025 0800  Gross per 24 hour   Intake 988 ml   Output 2470 ml   Net -1482 ml     Chronic respiratory acidosis with metabolic compensation, improved PvCO2 since admission.  Urine output adequate  Hyponatremia    Plan:   Replace electrolytes if abnormal  Monitor I/O, urine output, kidney function and electrolytes and treat as needed. Avoid nephrotoxic agents.    Infectious Disease:  Assessment:  7-Day Micro Results       Collected Updated Procedure Result Status      03/19/2025 0426 03/21/2025 0756 Respiratory Aerobic Bacterial Culture with Gram Stain [[id number]]   (Abnormal)   Aspirate from Endotracheal    Final result Component Value   Culture 4+ Normal Rachele   Gram Stain Result >25 PMNs/low power field    4+ Gram positive bacilli resembling diphtheroids    1+ Gram positive cocci               03/18/2025 2207 03/22/2025 1418 Respiratory Aerobic Bacterial Culture with Gram Stain [[id number]]    (Abnormal)    Sputum from Endotracheal    Final result Component Value   Culture 4+ Streptococcus pseudopneumoniae    Identification obtained by MALDI-TOF mass spectrometry research use only database. Test characteristics determined and verified by the [facility].    4+ Normal jorge   Gram Stain Result >25 PMNs/low power field    3+ Gram positive bacilli resembling diphtheroids    1+ Gram positive cocci        Susceptibility        Streptococcus pseudopneumoniae      WALLY      Ceftriaxone (meningitis) <=0.25 ug/mL Susceptible      Ceftriaxone (non-meningitis) <=0.25 ug/mL Susceptible      Clindamycin <=0.12 ug/mL Susceptible  [1]       Erythromycin >2 ug/mL Resistant      Levofloxacin <=1 ug/mL Susceptible      Penicillin (meningitis) <=0.06 ug/mL Susceptible      Penicillin (non-meningitis) <=0.06 ug/mL Susceptible      Penicillin(oral) <=0.06 ug/mL Susceptible      Vancomycin 0.5 ug/mL Susceptible                   [1]  This isolate DOES NOT demonstrate inducible clindamycin resistance in vitro. Clindamycin is susceptible and could be used when indicated, however, erythromycin is resistant and should not be used.                    03/18/2025 1728 03/23/2025 2316 Blood Culture Peripheral Blood [[id number]]   Peripheral Blood    Final result Component Value   Culture No Growth               03/18/2025 1728 03/23/2025 2006 Blood Culture Peripheral Blood [[id number]]   Peripheral Blood    Final result Component Value   Culture No Growth                     Aspiration pneumonia: fevers and leukocytosis has resolved, continues to have purulent secretions though improved following bronchoscopy, Streptococcus growing in sputum, on ceftriaxone    Plan:  Continue ceftriaxone, complete total 7d of antibiotics    Hematology and Oncology:  Assessment:  Recent Labs   Lab 03/23/25  0434 03/22/25  0116 03/20/25  0441 03/19/25  0520   WBC 9.3 10.7 12.2* 12.9*   RBC 3.26* 3.05* 3.03* 3.20*   HGB 9.2* 8.7*  "8.6* 9.0*   HCT 30.3* 29.5* 30.0* 31.2*   MCV 93 97 99 98   MCH 28.2 28.5 28.4 28.1   MCHC 30.4* 29.5* 28.7* 28.8*   RDW 14.5 14.6 14.6 14.4    257  257 240 225     No lab results found in last 7 days.  No results found for: \"AAUFH\"    1.  Moderate anemia.  No signs of bleeding or hemodynamic instability.     Plan:  Continue ceftriaxone as above.  Anticoagulation:  Enoxaparin at prophylactic dose (40mg daily)    Endocrinology and Rheumatology:  Assessment:  Stress hyperglycemia  Recent Labs   Lab 03/24/25  0558 03/24/25  0426 03/24/25  0007 03/23/25  2057 03/23/25  1616 03/23/25  1249   GLC 97 104* 105* 123* 113* 90      Plan:  Insulin orders in place.  Has not required  Steroids    Skin and Musculoskeletal:  Assessment:  Standard skin cares    Family update: Will follow up with family this afternoon.    Clinically Significant Risk Factors     # Obesity: Estimated body mass index is 39.4 kg/m  as calculated from the following:    Height as of this encounter: 1.575 m (5' 2\").    Weight as of this encounter: 97.7 kg (215 lb 6.2 oz).  # Severe Malnutrition: based on nutrition assessment and treatment provided per dietitian's recommendations.        [facility] ICU Rounding checklist    Assessment of central venous catheter access Central access present and needed   Assessment of urinary catheter use Remove Blount today.   DVT prophylaxis Subcutaneous heparin product (heparin or LMWH)     Patient to be seen and discussed with Dr. Schwartz.    Davida Fuller MD  Surgical Critical Care Fellow  "

## 2025-03-24 NOTE — PROGRESS NOTES
"[facility] [place]    Vascular Neurology Progress Note    Interval History     No acute event overnight.  Blood pressure ranging from 116/69 up to 152/84.  Last temperature 37.2.  No fever overnight.  Labs: Sodium 133, anion gap 7, BUN 22.    Hospital Course     Chief complaint: Cardiac Arrest    59 year old male with a PMH of acute on chronic hypoxemic and hypercapnic repsiratory failure, BiPAP dependence, COPD, CKD 3, HTN, HLD, RV diastolic dysfunction, tricuspid regurgitation, DM2 who presented to the ED for out-of-hospital PEA cardiac arrest s/p ROSC. On admission, CT head showed some early changes concerning for anoxic brain injury with subtle loss of gray-white differentiation.  cEEG with suppressed activity but was negative for any seizures.  On exam off sedation he is comatose, he does have preserved brainstem reflexes but with poor motor response.  Family requesting more time after we had explained the poor long-term neuro prognosis. MRI brain revealing diffuse anoxic brain injury.    Assessment and Plan     Significant anoxic brain injury secondary to cardiac arrest    Recommendations   -Minimize sedation   -Euthermic, Euglycemia, normonatremia  -Continue supportive cares  -Will be available for care conference    Patient Follow-up    - in 6 weeks with general neurology or stroke VERONICA ([phone])    We will continue to follow.     The Stroke Staff is Dr. Blanco.    Roberto Carlos Joe MD  Vascular Neurology Fellow    To page me or covering stroke neurology team member, click here: [facility]  Choose \"On Call\" tab at top, then select \"NEUROLOGY/ALL SITES\" from middle drop-down box, press Enter, then look for \"stroke\" or \"telestroke\" for your site.    Physical Examination     Temp: 97  F (36.1  C) Temp src: Esophageal BP: 116/69 Pulse: 66   Resp: 17 SpO2: (!) 91 % O2 Device: Mechanical Ventilator      Neurologic (4 score coma scale :- 7 points E1 M1 B4 R1)  Mental Status:   Comatose, not opening " eyes to voice, opening eyes to sternal rubs, does not track, does not follow commands, breathing above vent rate.   Cranial Nerves:  visual fields not intact to threat, PERRL, oculocephalic reflex present, weak cough, corneal reflex intact.   Motor: min withdrawal in R upper and lower extremities.(Likely reflexive), No withdrawal in LUE  Reflexes: Unequivocal  Coordination: Unable to test  Station/Gait:  deferred      Imaging/Labs   (Bolded imaging and labs new and/or personally reviewed or re-reviewed by me today)    MRI/Head CT CT Head   IMPRESSION:  1.  Some loss of gray-white matter differentiation involving the bilateral basal ganglia, thalami, and potentially the insular cortex compared to the previous exam. Hypoxic/ischemic injury in these locations is possible. The extent of ischemic injuries   would be more accurately evaluated by MRI.  2.  No hemorrhage, extra-axial collection, or mass effect.    MRI brain  IMPRESSION:  1.  Evidence of diffuse anoxic brain injury.  2.  No intracranial hemorrhage.   Intracranial Vasculature Not done   Cervical Vasculature Not done     Echocardiogram The left ventricle is normal in size.  Left ventricular systolic function is normal.  The visual ejection fraction is 55-60%.  Normal left ventricular wall motion  The right ventricle is normal in structure, function and size.  Mild valvular aortic stenosis. The mean AoV pressure gradient is 13mmHg.  The rhythm was atrial fibrillation.  There is no comparison study available.   EKG/Telemetry AF     LDL  No lab value available in past 30 days   A1C  No lab value available in past 90 days   Troponin No lab value available in past 48 hrs

## 2025-03-24 NOTE — PROCEDURES
ICU BRONCHOSCOPY    Procedure(s):    Bronchoscopy  Therapeutic suctioniong    Indication:  Mucus plugging, hypoxia    Procedure Details:     The bronchoscope was inserted through the mouth via ETT.    Airway Examination:  A complete airway examination was performed from the distal trachea to the subsegmental level in each lobe of both lungs.  Pertinent findings include copious white mucus with friable airways, more on left than the right.                                                                                                                                                                               Therapeutic suctioning was performed.  Specimens were sent to the lab.    The ETT was measured 6cm from the gregorio and thus was advanced 3cm from 23cm at the teeth to 26cm at the teeth.     Any disposable equipment was visually inspected and deemed to be intact immediately post procedure.      Recommendations:     -->  Continue to wean PEEP and FiO2 as able.    ==========================================================    Attending of Record:   Hilton Schwartz MD    Trainee(s) Present: Davida Fuller MD    Medications:    5 ml 1% lidocaine  0.5 mg versed  50 mcg fentanyl    Time Out:  Performed by verifying the correct patient, correct procedure, and ensuring that all equipment / support staff are present.

## 2025-03-24 NOTE — PLAN OF CARE
"Neuro: PERRL, unable to open eyes, does not follow commands  CV: Tele SR  Resp: LS coarse, vent at AC 16 rr / 450 vol / 80% / 12 peep  Gtts: none  GI: TF paused since 0100 due to low O2 sats and increase in secretions  : Blount with adequate urine output  Skin: see chart  Activity: bedrest  Additional: temp max 100.8F -tylenol given with good results. Bronch completed around 0130    Problem: Adult Inpatient Plan of Care  Goal: Plan of Care Review  Description: The Plan of Care Review/Shift note should be completed every shift.  The Outcome Evaluation is a brief statement about your assessment that the patient is improving, declining, or no change.  This information will be displayed automatically on your shift  note.  Outcome: Not Progressing  Goal: Patient-Specific Goal (Individualized)  Description: You can add care plan individualizations to a care plan. Examples of Individualization might be:  \"Parent requests to be called daily at 9am for status\", \"I have a hard time hearing out of my right ear\", or \"Do not touch me to wake me up as it startles  me\".  Outcome: Not Progressing  Goal: Absence of Hospital-Acquired Illness or Injury  Outcome: Not Progressing  Intervention: Identify and Manage Fall Risk  Recent Flowsheet Documentation  Taken 3/24/2025 0600 by Jase Baker, RN  Safety Promotion/Fall Prevention:   clutter free environment maintained   room door open   room near nurse's station   room organization consistent   safety round/check completed  Taken 3/24/2025 0000 by Jase Baker, RN  Safety Promotion/Fall Prevention:   clutter free environment maintained   room door open   room near nurse's station   room organization consistent   safety round/check completed  Taken 3/23/2025 2000 by Jase Baker, RN  Safety Promotion/Fall Prevention:   clutter free environment maintained   room door open   room near nurse's station   room organization consistent   safety round/check completed  Intervention: " Prevent Skin Injury  Recent Flowsheet Documentation  Taken 3/24/2025 0600 by Jase Baker RN  Body Position:   turned   heels elevated   upper extremity elevated  Skin Protection:   adhesive use limited   incontinence pads utilized   pulse oximeter probe site changed   silicone foam dressing in place   skin to device areas padded   transparent dressing maintained   tubing/devices free from skin contact  Taken 3/24/2025 0400 by Jase Baker RN  Body Position:   turned   heels elevated   upper extremity elevated  Taken 3/24/2025 0200 by Jase Baker RN  Body Position:   turned   heels elevated   upper extremity elevated  Taken 3/24/2025 0000 by Jase Baker RN  Body Position:   turned   heels elevated   upper extremity elevated  Skin Protection:   adhesive use limited   incontinence pads utilized   pulse oximeter probe site changed   silicone foam dressing in place   skin to device areas padded   transparent dressing maintained   tubing/devices free from skin contact  Taken 3/23/2025 2200 by Jase Baker RN  Body Position:   turned   heels elevated   upper extremity elevated  Taken 3/23/2025 2000 by Jase Baker RN  Body Position:   turned   heels elevated   upper extremity elevated  Skin Protection:   adhesive use limited   incontinence pads utilized   pulse oximeter probe site changed   silicone foam dressing in place   skin to device areas padded   transparent dressing maintained   tubing/devices free from skin contact  Intervention: Prevent and Manage VTE (Venous Thromboembolism) Risk  Recent Flowsheet Documentation  Taken 3/24/2025 0600 by Jase Baker RN  VTE Prevention/Management: SCDs on (sequential compression devices)  Taken 3/24/2025 0000 by Jase Baker RN  VTE Prevention/Management: SCDs on (sequential compression devices)  Taken 3/23/2025 2000 by Jase Baker RN  VTE Prevention/Management: SCDs on (sequential compression devices)  Goal: Optimal Comfort and  Wellbeing  Outcome: Not Progressing  Intervention: Provide Person-Centered Care  Recent Flowsheet Documentation  Taken 3/24/2025 0600 by Jase Baker RN  Trust Relationship/Rapport:   care explained   reassurance provided   emotional support provided  Taken 3/24/2025 0000 by Jase Baker RN  Trust Relationship/Rapport:   care explained   reassurance provided   emotional support provided  Taken 3/23/2025 2000 by Jase Baker RN  Trust Relationship/Rapport:   care explained   reassurance provided  Goal: Readiness for Transition of Care  Outcome: Not Progressing     Problem: Mechanical Ventilation Invasive  Goal: Effective Communication  Outcome: Not Progressing  Intervention: Ensure Effective Communication  Recent Flowsheet Documentation  Taken 3/24/2025 0600 by Jase Baker RN  Trust Relationship/Rapport:   care explained   reassurance provided   emotional support provided  Taken 3/24/2025 0000 by Jase Baker RN  Trust Relationship/Rapport:   care explained   reassurance provided   emotional support provided  Taken 3/23/2025 2000 by Jase Baker RN  Trust Relationship/Rapport:   care explained   reassurance provided  Goal: Optimal Device Function  Outcome: Not Progressing  Intervention: Optimize Device Care and Function  Recent Flowsheet Documentation  Taken 3/24/2025 0600 by Jase Baker RN  Oral Care:   swabbed with antiseptic solution   suction provided   teeth brushed  Taken 3/24/2025 0400 by Jase Baker RN  Oral Care:   swabbed with antiseptic solution   suction provided  Taken 3/24/2025 0200 by Jase Baker RN  Oral Care:   swabbed with antiseptic solution   suction provided  Taken 3/24/2025 0000 by Jase Baker RN  Oral Care:   swabbed with antiseptic solution   suction provided   teeth brushed  Taken 3/23/2025 2200 by Jase Baker RN  Oral Care:   swabbed with antiseptic solution   suction provided  Taken 3/23/2025 2000 by Jase Baker RN  Oral  Care:   swabbed with antiseptic solution   suction provided   teeth brushed  Goal: Mechanical Ventilation Liberation  Outcome: Not Progressing  Intervention: Promote Extubation and Mechanical Ventilation Liberation  Recent Flowsheet Documentation  Taken 3/24/2025 0600 by Jase Baker RN  Sleep/Rest Enhancement:   comfort measures   relaxation techniques promoted  Medication Review/Management:   medications reviewed   high-risk medications identified  Environmental Support: calm environment promoted  Taken 3/24/2025 0000 by Jase Baker RN  Sleep/Rest Enhancement:   comfort measures   relaxation techniques promoted  Medication Review/Management:   medications reviewed   high-risk medications identified  Environmental Support: calm environment promoted  Taken 3/23/2025 2000 by Jase Baker RN  Sleep/Rest Enhancement:   comfort measures   relaxation techniques promoted  Medication Review/Management:   medications reviewed   high-risk medications identified  Environmental Support: calm environment promoted  Goal: Optimal Nutrition Delivery  Outcome: Not Progressing  Intervention: Optimize Nutrition Delivery  Recent Flowsheet Documentation  Taken 3/24/2025 0600 by Jase Baker RN  Nutrition Support Management: weight trending reviewed  Taken 3/24/2025 0000 by Jase Baker RN  Nutrition Support Management: weight trending reviewed  Taken 3/23/2025 2000 by Jase Baker RN  Nutrition Support Management: weight trending reviewed  Goal: Absence of Device-Related Skin and Tissue Injury  Outcome: Not Progressing  Intervention: Maintain Skin and Tissue Health  Recent Flowsheet Documentation  Taken 3/24/2025 0600 by Jase Baker, RN  Device Skin Pressure Protection:   absorbent pad utilized/changed   adhesive use limited   positioning supports utilized   pressure points protected   skin-to-device areas padded   tubing/devices free from skin contact  Taken 3/24/2025 0000 by Jase Baker  RN  Device Skin Pressure Protection:   absorbent pad utilized/changed   adhesive use limited   positioning supports utilized   pressure points protected   skin-to-device areas padded   tubing/devices free from skin contact  Taken 3/23/2025 2000 by Jase Baker RN  Device Skin Pressure Protection:   absorbent pad utilized/changed   adhesive use limited   positioning supports utilized   pressure points protected   skin-to-device areas padded   tubing/devices free from skin contact  Goal: Absence of Ventilator-Induced Lung Injury  Outcome: Not Progressing  Intervention: Prevent Ventilator-Associated Pneumonia  Recent Flowsheet Documentation  Taken 3/24/2025 0600 by Jase Baker, RN  VAP Prevention Bundle:   HOB elevation maintained   oral care regularly provided   readiness to extubate assessed   stress ulcer prophylaxis provided   vent circuit breaks minimized   VTE prophylaxis provided  Oral Care:   swabbed with antiseptic solution   suction provided   teeth brushed  Head of Bed (HOB) Positioning: HOB at 30 degrees  VAP Prevention Contraindications: no/minimum sedation required  VAP Prevention Measures: completed  Taken 3/24/2025 0400 by Jase Baker RN  Oral Care:   swabbed with antiseptic solution   suction provided  Head of Bed (HOB) Positioning: HOB at 30 degrees  Taken 3/24/2025 0200 by Jase Baker RN  Oral Care:   swabbed with antiseptic solution   suction provided  Head of Bed (HOB) Positioning: HOB at 30 degrees  Taken 3/24/2025 0000 by Jase Baker RN  VAP Prevention Bundle:   HOB elevation maintained   oral care regularly provided   readiness to extubate assessed   stress ulcer prophylaxis provided   vent circuit breaks minimized   VTE prophylaxis provided  Oral Care:   swabbed with antiseptic solution   suction provided   teeth brushed  Head of Bed (HOB) Positioning: HOB at 30 degrees  VAP Prevention Contraindications: no/minimum sedation required  VAP Prevention Measures:  completed  Taken 3/23/2025 2200 by Jase Baker RN  Oral Care:   swabbed with antiseptic solution   suction provided  Head of Bed (HOB) Positioning: HOB at 30 degrees  Taken 3/23/2025 2000 by Jase Baker RN  VAP Prevention Bundle:   HOB elevation maintained   oral care regularly provided   readiness to extubate assessed   stress ulcer prophylaxis provided   vent circuit breaks minimized   VTE prophylaxis provided  Oral Care:   swabbed with antiseptic solution   suction provided   teeth brushed  Head of Bed (HOB) Positioning: HOB at 30 degrees  VAP Prevention Contraindications: no/minimum sedation required  VAP Prevention Measures: completed     Problem: Comorbidity Management  Goal: Maintenance of Heart Failure Symptom Control  Outcome: Not Progressing  Intervention: Maintain Heart Failure Management  Recent Flowsheet Documentation  Taken 3/24/2025 0600 by Jase Baker RN  Medication Review/Management:   medications reviewed   high-risk medications identified  Taken 3/24/2025 0000 by Jase Baker RN  Medication Review/Management:   medications reviewed   high-risk medications identified  Taken 3/23/2025 2000 by Jase Baker RN  Medication Review/Management:   medications reviewed   high-risk medications identified     Problem: Risk for Delirium  Goal: Optimal Coping  Outcome: Not Progressing  Intervention: Optimize Psychosocial Adjustment to Delirium  Recent Flowsheet Documentation  Taken 3/24/2025 0600 by Jase Baker RN  Supportive Measures: relaxation techniques promoted  Taken 3/24/2025 0000 by Jaes Baker RN  Supportive Measures: relaxation techniques promoted  Taken 3/23/2025 2000 by Jase Baker RN  Supportive Measures: relaxation techniques promoted  Goal: Improved Behavioral Control  Outcome: Not Progressing  Intervention: Prevent and Manage Agitation  Recent Flowsheet Documentation  Taken 3/24/2025 0600 by Jase Baker RN  Environment Familiarity/Consistency:  daily routine followed  Taken 3/24/2025 0000 by Jase Baker RN  Environment Familiarity/Consistency: daily routine followed  Taken 3/23/2025 2000 by Jase Baker RN  Environment Familiarity/Consistency: daily routine followed  Intervention: Minimize Safety Risk  Recent Flowsheet Documentation  Taken 3/24/2025 0600 by Jase Baker RN  Enhanced Safety Measures:   review medications for side effects with activity   room near unit station  Trust Relationship/Rapport:   care explained   reassurance provided   emotional support provided  Taken 3/24/2025 0000 by Jase Baker RN  Enhanced Safety Measures:   review medications for side effects with activity   room near unit station  Trust Relationship/Rapport:   care explained   reassurance provided   emotional support provided  Taken 3/23/2025 2000 by Jase Baker RN  Enhanced Safety Measures:   review medications for side effects with activity   room near unit station  Trust Relationship/Rapport:   care explained   reassurance provided  Goal: Improved Attention and Thought Clarity  Outcome: Not Progressing  Intervention: Maximize Cognitive Function  Recent Flowsheet Documentation  Taken 3/24/2025 0600 by Jase Baker RN  Sensory Stimulation Regulation:   care clustered   lighting decreased   television on  Reorientation Measures:   clock in view   reorientation provided  Taken 3/24/2025 0000 by Jase Baker RN  Sensory Stimulation Regulation:   care clustered   lighting decreased   television on  Reorientation Measures:   clock in view   reorientation provided  Taken 3/23/2025 2000 by Jase Baker RN  Sensory Stimulation Regulation:   care clustered   lighting decreased   television on  Reorientation Measures:   clock in view   reorientation provided  Goal: Improved Sleep  Outcome: Not Progressing  Intervention: Promote Sleep  Recent Flowsheet Documentation  Taken 3/24/2025 0600 by Jase Baker RN  Sleep/Rest Enhancement:    comfort measures   relaxation techniques promoted  Taken 3/24/2025 0000 by Jase Baker RN  Sleep/Rest Enhancement:   comfort measures   relaxation techniques promoted  Taken 3/23/2025 2000 by Jase Baker RN  Sleep/Rest Enhancement:   comfort measures   relaxation techniques promoted     Problem: Skin Injury Risk Increased  Goal: Skin Health and Integrity  Outcome: Not Progressing  Intervention: Plan: Nurse Driven Intervention: Positioning  Recent Flowsheet Documentation  Taken 3/24/2025 0600 by Jase Baker RN  Plan: Positioning Interventions:   REPOSITION Left/Right (No supine) q2h   Use wedge positioners   HOB 30 degrees or less   OFF-LOAD HEELS with pillows  Taken 3/24/2025 0000 by Jase Baker RN  Plan: Positioning Interventions:   REPOSITION Left/Right (No supine) q2h   Use wedge positioners   HOB 30 degrees or less   OFF-LOAD HEELS with pillows  Taken 3/23/2025 2000 by Jase Baker RN  Plan: Positioning Interventions:   REPOSITION Left/Right (No supine) q2h   Use wedge positioners   HOB 30 degrees or less   OFF-LOAD HEELS with pillows  Intervention: Plan: Nurse Driven Intervention: Moisture Management  Recent Flowsheet Documentation  Taken 3/24/2025 0600 by Jase Baker RN  Moisture Interventions:   No brief in bed   Incontinence pad   Urinary collection device  Taken 3/24/2025 0400 by Jase Baker RN  Bathing/Skin Care:   bath, complete   dressed/undressed   electrode patches/site rotation   linen changed  Taken 3/24/2025 0000 by Jase Baker RN  Moisture Interventions:   No brief in bed   Incontinence pad   Urinary collection device  Taken 3/23/2025 2000 by Jase Baker RN  Moisture Interventions:   No brief in bed   Incontinence pad   Urinary collection device  Intervention: Plan: Nurse Driven Intervention: Friction and Shear  Recent Flowsheet Documentation  Taken 3/24/2025 0600 by Jase Baker RN  Friction/Shear Interventions:   HOB 30 degrees or less    Lateral transfer device (hovermat, etc.)  Taken 3/24/2025 0000 by Jase Baker RN  Friction/Shear Interventions:   HOB 30 degrees or less   Lateral transfer device (hovermat, etc.)  Taken 3/23/2025 2000 by Jase Baker RN  Friction/Shear Interventions:   HOB 30 degrees or less   Lateral transfer device (hovermat, etc.)  Intervention: Optimize Skin Protection  Recent Flowsheet Documentation  Taken 3/24/2025 0600 by Jase Baker RN  Skin Protection:   adhesive use limited   incontinence pads utilized   pulse oximeter probe site changed   silicone foam dressing in place   skin to device areas padded   transparent dressing maintained   tubing/devices free from skin contact  Activity Management: bedrest  Head of Bed (HOB) Positioning: HOB at 30 degrees  Taken 3/24/2025 0400 by Jase Baker RN  Head of Bed (HOB) Positioning: HOB at 30 degrees  Taken 3/24/2025 0200 by Jase Baker RN  Head of Bed (HOB) Positioning: HOB at 30 degrees  Taken 3/24/2025 0000 by Jase Baekr RN  Skin Protection:   adhesive use limited   incontinence pads utilized   pulse oximeter probe site changed   silicone foam dressing in place   skin to device areas padded   transparent dressing maintained   tubing/devices free from skin contact  Activity Management: bedrest  Head of Bed (HOB) Positioning: HOB at 30 degrees  Taken 3/23/2025 2200 by Jase Baker RN  Head of Bed (HOB) Positioning: HOB at 30 degrees  Taken 3/23/2025 2000 by Jase Baker RN  Skin Protection:   adhesive use limited   incontinence pads utilized   pulse oximeter probe site changed   silicone foam dressing in place   skin to device areas padded   transparent dressing maintained   tubing/devices free from skin contact  Activity Management: bedrest  Head of Bed (HOB) Positioning: HOB at 30 degrees   Goal Outcome Evaluation:           Overall Patient Progress: no changeOverall Patient Progress: no change

## 2025-03-24 NOTE — PROGRESS NOTES
[facility] ICU RESPIRATORY NOTE        Date of Admission: 3/18/2025    Date of Intubation (most recent): 03/18/2025    Reason for Mechanical Ventilation:Cardiac arrest     Number of Days on Mechanical Ventilation: 7    Met Criteria for Spontaneous Breathing Trial: Yes     Significant Events Today: Bronchoscopy was performed overnight and mucus plug was suctioned.     ABG Results:   Recent Labs   Lab 03/24/25  0425 03/23/25  0434 03/22/25  1043 03/20/25  1302 03/19/25  0028 03/18/25  2202 03/18/25  2142 03/18/25  1821   PH  --   --   --   --   --  7.39  --  7.30*   PCO2  --   --   --   --   --  80*  --  87*   PO2  --   --   --   --   --  70*  --  57*   HCO3  --   --   --   --   --  48*  --  42*   O2PER 100 50 60 60   < > 70   < >  --     < > = values in this interval not displayed.         Current Vent Settings: FiO2 (%): 90 %, Resp: 17, Vent Mode: CMV/AC, Resp Rate (Set): 16 breaths/min, Tidal Volume (Set, mL): 450 mL, PEEP (cm H2O): 12 cmH2O, Pressure Support (cm H2O): 10 cmH2O, Resp Rate (Set): 16 breaths/min, Tidal Volume (Set, mL): 450 mL, PEEP (cm H2O): 12 cmH2O      Sarbjit Mohamud, RT on 3/24/2025 at 5:50 AM

## 2025-03-25 LAB
ALBUMIN SERPL BCG-MCNC: 3.2 G/DL (ref 3.5–5.2)
ALP SERPL-CCNC: 66 U/L (ref 40–150)
ALT SERPL W P-5'-P-CCNC: 13 U/L (ref 0–70)
ANION GAP SERPL CALCULATED.3IONS-SCNC: 4 MMOL/L (ref 7–15)
AST SERPL W P-5'-P-CCNC: 34 U/L (ref 0–45)
BASE EXCESS BLDV CALC-SCNC: 7.6 MMOL/L (ref -3–3)
BILIRUB SERPL-MCNC: 0.2 MG/DL
BUN SERPL-MCNC: 27.2 MG/DL (ref 8–23)
CALCIUM SERPL-MCNC: 8.6 MG/DL (ref 8.8–10.4)
CHLORIDE SERPL-SCNC: 94 MMOL/L (ref 98–107)
CREAT SERPL-MCNC: 0.54 MG/DL (ref 0.67–1.17)
EGFRCR SERPLBLD CKD-EPI 2021: >90 ML/MIN/1.73M2
ERYTHROCYTE [DISTWIDTH] IN BLOOD BY AUTOMATED COUNT: 14.2 % (ref 10–15)
GLUCOSE BLDC GLUCOMTR-MCNC: 111 MG/DL (ref 70–99)
GLUCOSE BLDC GLUCOMTR-MCNC: 128 MG/DL (ref 70–99)
GLUCOSE BLDC GLUCOMTR-MCNC: 129 MG/DL (ref 70–99)
GLUCOSE BLDC GLUCOMTR-MCNC: 136 MG/DL (ref 70–99)
GLUCOSE BLDC GLUCOMTR-MCNC: 139 MG/DL (ref 70–99)
GLUCOSE BLDC GLUCOMTR-MCNC: 140 MG/DL (ref 70–99)
GLUCOSE SERPL-MCNC: 145 MG/DL (ref 70–99)
HCO3 BLDV-SCNC: 34 MMOL/L (ref 21–28)
HCO3 SERPL-SCNC: 34 MMOL/L (ref 22–29)
HCT VFR BLD AUTO: 31.2 % (ref 40–53)
HGB BLD-MCNC: 9.6 G/DL (ref 13.3–17.7)
MAGNESIUM SERPL-MCNC: 1.9 MG/DL (ref 1.7–2.3)
MCH RBC QN AUTO: 28.1 PG (ref 26.5–33)
MCHC RBC AUTO-ENTMCNC: 30.8 G/DL (ref 31.5–36.5)
MCV RBC AUTO: 91 FL (ref 78–100)
O2/TOTAL GAS SETTING VFR VENT: 60 %
OXYHGB MFR BLDV: 87 % (ref 70–75)
PCO2 BLDV: 55 MM HG (ref 40–50)
PH BLDV: 7.4 [PH] (ref 7.32–7.43)
PHOSPHATE SERPL-MCNC: 2.6 MG/DL (ref 2.5–4.5)
PLATELET # BLD AUTO: 271 10E3/UL (ref 150–450)
PO2 BLDV: 56 MM HG (ref 25–47)
POTASSIUM SERPL-SCNC: 4.3 MMOL/L (ref 3.4–5.3)
PROT SERPL-MCNC: 6.5 G/DL (ref 6.4–8.3)
RBC # BLD AUTO: 3.42 10E6/UL (ref 4.4–5.9)
SAO2 % BLDV: 88.2 % (ref 70–75)
SODIUM SERPL-SCNC: 132 MMOL/L (ref 135–145)
WBC # BLD AUTO: 17.4 10E3/UL (ref 4–11)

## 2025-03-25 PROCEDURE — 82805 BLOOD GASES W/O2 SATURATION: CPT | Performed by: INTERNAL MEDICINE

## 2025-03-25 PROCEDURE — 258N000003 HC RX IP 258 OP 636: Performed by: STUDENT IN AN ORGANIZED HEALTH CARE EDUCATION/TRAINING PROGRAM

## 2025-03-25 PROCEDURE — 999N000157 HC STATISTIC RCP TIME EA 10 MIN

## 2025-03-25 PROCEDURE — 94640 AIRWAY INHALATION TREATMENT: CPT

## 2025-03-25 PROCEDURE — 250N000011 HC RX IP 250 OP 636: Performed by: INTERNAL MEDICINE

## 2025-03-25 PROCEDURE — 84132 ASSAY OF SERUM POTASSIUM: CPT | Performed by: INTERNAL MEDICINE

## 2025-03-25 PROCEDURE — 94003 VENT MGMT INPAT SUBQ DAY: CPT

## 2025-03-25 PROCEDURE — 83735 ASSAY OF MAGNESIUM: CPT | Performed by: INTERNAL MEDICINE

## 2025-03-25 PROCEDURE — 250N000011 HC RX IP 250 OP 636

## 2025-03-25 PROCEDURE — 200N000001 HC R&B ICU

## 2025-03-25 PROCEDURE — 99291 CRITICAL CARE FIRST HOUR: CPT | Mod: GC | Performed by: PSYCHIATRY & NEUROLOGY

## 2025-03-25 PROCEDURE — 250N000009 HC RX 250: Performed by: SURGERY

## 2025-03-25 PROCEDURE — 250N000009 HC RX 250: Performed by: INTERNAL MEDICINE

## 2025-03-25 PROCEDURE — 258N000003 HC RX IP 258 OP 636: Performed by: SURGERY

## 2025-03-25 PROCEDURE — 84100 ASSAY OF PHOSPHORUS: CPT | Performed by: INTERNAL MEDICINE

## 2025-03-25 PROCEDURE — 99291 CRITICAL CARE FIRST HOUR: CPT | Performed by: INTERNAL MEDICINE

## 2025-03-25 PROCEDURE — 250N000013 HC RX MED GY IP 250 OP 250 PS 637

## 2025-03-25 PROCEDURE — 250N000011 HC RX IP 250 OP 636: Performed by: STUDENT IN AN ORGANIZED HEALTH CARE EDUCATION/TRAINING PROGRAM

## 2025-03-25 PROCEDURE — 250N000011 HC RX IP 250 OP 636: Performed by: SURGERY

## 2025-03-25 PROCEDURE — 94640 AIRWAY INHALATION TREATMENT: CPT | Mod: 76

## 2025-03-25 PROCEDURE — 85027 COMPLETE CBC AUTOMATED: CPT | Performed by: INTERNAL MEDICINE

## 2025-03-25 PROCEDURE — 80053 COMPREHEN METABOLIC PANEL: CPT | Performed by: INTERNAL MEDICINE

## 2025-03-25 RX ORDER — PREDNISONE 10 MG/1
20 TABLET ORAL DAILY
Status: DISCONTINUED | OUTPATIENT
Start: 2025-04-01 | End: 2025-03-26

## 2025-03-25 RX ORDER — LEVETIRACETAM 10 MG/ML
1000 INJECTION INTRAVASCULAR EVERY 12 HOURS
Status: DISCONTINUED | OUTPATIENT
Start: 2025-03-25 | End: 2025-03-26

## 2025-03-25 RX ORDER — PREDNISONE 10 MG/1
30 TABLET ORAL DAILY
Status: DISCONTINUED | OUTPATIENT
Start: 2025-03-29 | End: 2025-03-26

## 2025-03-25 RX ORDER — PREDNISONE 10 MG/1
40 TABLET ORAL DAILY
Status: DISCONTINUED | OUTPATIENT
Start: 2025-03-26 | End: 2025-03-26

## 2025-03-25 RX ORDER — MAGNESIUM SULFATE HEPTAHYDRATE 40 MG/ML
2 INJECTION, SOLUTION INTRAVENOUS ONCE
Status: COMPLETED | OUTPATIENT
Start: 2025-03-25 | End: 2025-03-25

## 2025-03-25 RX ORDER — PREDNISONE 10 MG/1
10 TABLET ORAL DAILY
Status: DISCONTINUED | OUTPATIENT
Start: 2025-04-04 | End: 2025-03-26

## 2025-03-25 RX ORDER — LORAZEPAM 2 MG/ML
4 INJECTION INTRAMUSCULAR ONCE
Status: COMPLETED | OUTPATIENT
Start: 2025-03-25 | End: 2025-03-25

## 2025-03-25 RX ADMIN — AMPICILLIN SODIUM AND SULBACTAM SODIUM 3 G: 2; 1 INJECTION, POWDER, FOR SOLUTION INTRAMUSCULAR; INTRAVENOUS at 22:14

## 2025-03-25 RX ADMIN — ACETYLCYSTEINE 2 ML: 200 SOLUTION ORAL; RESPIRATORY (INHALATION) at 07:20

## 2025-03-25 RX ADMIN — PANTOPRAZOLE SODIUM 40 MG: 40 INJECTION, POWDER, FOR SOLUTION INTRAVENOUS at 10:02

## 2025-03-25 RX ADMIN — IPRATROPIUM BROMIDE AND ALBUTEROL SULFATE 3 ML: .5; 3 SOLUTION RESPIRATORY (INHALATION) at 19:46

## 2025-03-25 RX ADMIN — IPRATROPIUM BROMIDE AND ALBUTEROL SULFATE 3 ML: .5; 3 SOLUTION RESPIRATORY (INHALATION) at 12:17

## 2025-03-25 RX ADMIN — BUDESONIDE 0.25 MG: 0.25 INHALANT RESPIRATORY (INHALATION) at 07:20

## 2025-03-25 RX ADMIN — AMPICILLIN SODIUM AND SULBACTAM SODIUM 3 G: 2; 1 INJECTION, POWDER, FOR SOLUTION INTRAMUSCULAR; INTRAVENOUS at 10:10

## 2025-03-25 RX ADMIN — METHYLPREDNISOLONE SODIUM SUCCINATE 40 MG: 40 INJECTION, POWDER, FOR SOLUTION INTRAMUSCULAR; INTRAVENOUS at 13:40

## 2025-03-25 RX ADMIN — LEVETIRACETAM 2000 MG: 100 INJECTION, SOLUTION, CONCENTRATE INTRAVENOUS at 14:06

## 2025-03-25 RX ADMIN — CHLORHEXIDINE GLUCONATE 15 ML: 1.2 RINSE ORAL at 10:02

## 2025-03-25 RX ADMIN — ACETYLCYSTEINE 2 ML: 200 SOLUTION ORAL; RESPIRATORY (INHALATION) at 12:17

## 2025-03-25 RX ADMIN — INSULIN ASPART 1 UNITS: 100 INJECTION, SOLUTION INTRAVENOUS; SUBCUTANEOUS at 04:38

## 2025-03-25 RX ADMIN — LORAZEPAM 4 MG: 2 INJECTION INTRAMUSCULAR; INTRAVENOUS at 05:50

## 2025-03-25 RX ADMIN — IPRATROPIUM BROMIDE AND ALBUTEROL SULFATE 3 ML: .5; 3 SOLUTION RESPIRATORY (INHALATION) at 07:19

## 2025-03-25 RX ADMIN — ACETYLCYSTEINE 2 ML: 200 SOLUTION ORAL; RESPIRATORY (INHALATION) at 19:46

## 2025-03-25 RX ADMIN — SODIUM PHOSPHATE, MONOBASIC, MONOHYDRATE AND SODIUM PHOSPHATE, DIBASIC, ANHYDROUS 9 MMOL: 142; 276 INJECTION, SOLUTION INTRAVENOUS at 06:36

## 2025-03-25 RX ADMIN — AMPICILLIN SODIUM AND SULBACTAM SODIUM 3 G: 2; 1 INJECTION, POWDER, FOR SOLUTION INTRAMUSCULAR; INTRAVENOUS at 04:43

## 2025-03-25 RX ADMIN — BUDESONIDE 0.25 MG: 0.25 INHALANT RESPIRATORY (INHALATION) at 19:46

## 2025-03-25 RX ADMIN — IPRATROPIUM BROMIDE AND ALBUTEROL SULFATE 3 ML: .5; 3 SOLUTION RESPIRATORY (INHALATION) at 01:21

## 2025-03-25 RX ADMIN — AMPICILLIN SODIUM AND SULBACTAM SODIUM 3 G: 2; 1 INJECTION, POWDER, FOR SOLUTION INTRAMUSCULAR; INTRAVENOUS at 16:39

## 2025-03-25 RX ADMIN — CHLORHEXIDINE GLUCONATE 15 ML: 1.2 RINSE ORAL at 20:11

## 2025-03-25 RX ADMIN — ACETYLCYSTEINE 2 ML: 200 SOLUTION ORAL; RESPIRATORY (INHALATION) at 01:21

## 2025-03-25 RX ADMIN — ENOXAPARIN SODIUM 40 MG: 40 INJECTION SUBCUTANEOUS at 13:40

## 2025-03-25 RX ADMIN — LEVETIRACETAM 1000 MG: 10 INJECTION INTRAVENOUS at 20:12

## 2025-03-25 RX ADMIN — MAGNESIUM SULFATE HEPTAHYDRATE 2 G: 40 INJECTION, SOLUTION INTRAVENOUS at 06:35

## 2025-03-25 ASSESSMENT — ACTIVITIES OF DAILY LIVING (ADL)
ADLS_ACUITY_SCORE: 83
ADLS_ACUITY_SCORE: 79
ADLS_ACUITY_SCORE: 83

## 2025-03-25 NOTE — PROVIDER NOTIFICATION
MD Notification    Notified Person: MD    Notified Person Name: DAMION MD    Notification Date/Time: 0539 3/25/25    Notification Interaction: VERBAL     Purpose of Notification: pt biting tongue and taking chucks off. Bite block not working. Continues to bleed from mouth. Orders?    Orders Received: 4mg Ativan IV ordered.

## 2025-03-25 NOTE — CONSULTS
Care Management Initial Consult    General Information  Assessment completed with: Family,  (Martínez)  Type of CM/SW Visit: Initial Assessment    Primary Care Provider verified and updated as needed: No   Readmission within the last 30 days: no previous admission in last 30 days      Reason for Consult: end of life/hospice, decision-making, discharge planning  Advance Care Planning:            Communication Assessment  Patient's communication style: spoken language (English or Bilingual)    Hearing Difficulty or Deaf: other (see comments) (ludy)   Wear Glasses or Blind: other (see comments) (ludy)    Cognitive  Cognitive/Neuro/Behavioral: .WDL except  Level of Consciousness: unresponsive  Arousal Level: unresponsive  Orientation: other (see comments) (LUDY- intubated)  Mood/Behavior: other (see comments) (ULDY- intubated)  Best Language:  (LUDY- intubated)  Speech: endotracheal tube    Living Environment:   People in home: friend(s)     Current living Arrangements: house      Able to return to prior arrangements:    Living Arrangement Comments:  (brother did not know his exact address)    Family/Social Support:  Care provided by: self  Provides care for: no one  Marital Status: Single  Support system: Sibling(s), Friend          Description of Support System: Supportive         Current Resources:   Patient receiving home care services:          Community Resources:    Equipment currently used at home: other (see comments) (O2)  Supplies currently used at home:      Employment/Financial:  Employment Status: disabled        Financial Concerns:     Referral to Financial Worker: No       Does the patient's insurance plan have a 3 day qualifying hospital stay waiver?  No    Lifestyle & Psychosocial Needs:  Social Drivers of Health     Food Insecurity: No Food Insecurity (5/26/2024)    [facility]    Hunger Vital Sign     Worried About Running Out of Food in the Last Year: Never true     Ran  Out of Food in the Last Year: Never true   Depression: Unknown (8/11/2023)    [facility] [employer] [facility]    Depression (PHQ-9)     Last PHQ-9 Score: Not on file     Thoughts of self harm: Not at all   Housing Stability: Low Risk  (5/26/2024)    Received from [facility] [employer] [facility]    Housing Stability Vital Sign     Unable to Pay for Housing in the Last Year: No     Number of Times Moved in the Last Year: 0     Homeless in the Last Year: No   Tobacco Use: Medium Risk (2/11/2025)    Received from [facility] [employer] [facility]    Patient History     Smoking Tobacco Use: Former     Smokeless Tobacco Use: Never     Passive Exposure: Never   Financial Resource Strain: Not on file   Alcohol Use: Not At Risk (4/14/2020)    Received from [facility] [employer] [facility]    AUDIT-C     Frequency of Alcohol Consumption: Never     Average Number of Drinks: Not on file     Frequency of Binge Drinking: Not on file   Transportation Needs: No Transportation Needs (5/26/2024)    [facility]    Transportation Needs     In the past 12 months, has lack of transportation kept you from medical appointments, meetings, work, or from getting medicines or things needed for daily living?: No   Physical Activity: Not on file   Interpersonal Safety: Unknown (3/19/2025)    Interpersonal Safety     Do you feel physically and emotionally safe where you currently live?: Patient unable to answer     Within the past 12 months, have you been hit, slapped, kicked or otherwise physically hurt by someone?: Patient unable to answer     Within the past 12 months, have you been humiliated or emotionally abused in other ways by your partner or ex-partner?: Patient unable to answer   Stress: Not on file   Social Connections: Not on file   Health Literacy: Not on file       Functional Status:  Prior to admission patient needed assistance:   Dependent ADLs:: Independent          Mental Health  "Status:          Chemical Dependency Status:                Values/Beliefs:  Spiritual, Cultural Beliefs, Congregation Practices, Values that affect care:                 Discussed  Partnership in Safe Discharge Planning  document with patient/family: No    Additional Information:  Consult for discharge planning.     59 year old male with PMH of acute on chronic hypoxemic and hypercapnic respiratory failure with Bipap dependence, COPD, HTN, HLD, Stage III CKD, RV diastolic dysfunction, Tricuspid Regurgitation, and Type II DM . Presented to [facility] ED via EMS after cardiac arrest with last know well at 1608.     Writer reviewed chart. Writer called patients brother Martínez and introduced self and role. Writer could not confirm patient's primary doctor or  home address as accurate. Patient was visiting his brother Martínez in [place] when he was found down. Martínez reports patient was struggling with COPD and carried oyxgen with him. He lived with some friends in a house in [place] and has disability income. Martínez is accepting that patient is critcal and will likley not come out of a \"coma.\" He does not want patient to die alone but it is hard to visit here and is scary. Martínez reports his youngest brother Ran is the one who can not let go and he wished his brother and sister were in agreement about how to move forward as Martínez feels patient would not want to be a  vegetable. Writer provided empathetic listening and offered to call other family members if Martínez felt it could be helpful. Martínez thinks the staff here are doing great.      Next Steps: Follow for discharge planning     ARTHUR Dumont     "

## 2025-03-25 NOTE — PROGRESS NOTES
On license of [facility] ICU RESPIRATORY NOTE        Date of Admission: 3/18/2025    Date of Intubation (most recent): 3/18/2025    Reason for Mechanical Ventilation: resp failure    Number of Days on Mechanical Ventilation: 9    Met Criteria for Spontaneous Breathing Trial: No    Reason for No Spontaneous Breathing Trial: PEEP of 10    Bite Block: Yes; please explain: pt biting    Significant Events Today: patient biting tongue    ABG Results:   Recent Labs   Lab 03/24/25  1900 03/24/25  0901 03/24/25  0425 03/23/25  0434 03/19/25  0028 03/18/25  2202 03/18/25  2142 03/18/25  1821   PH  --   --   --   --   --  7.39  --  7.30*   PCO2  --   --   --   --   --  80*  --  87*   PO2  --   --   --   --   --  70*  --  57*   HCO3  --   --   --   --   --  48*  --  42*   O2PER 60 100 100 50   < > 70   < >  --     < > = values in this interval not displayed.         Current Vent Settings: FiO2 (%): 60 %, Resp: 16, Vent Mode: CMV/AC, Resp Rate (Set): 20 breaths/min, Tidal Volume (Set, mL): 470 mL, PEEP (cm H2O): 10 cmH2O, Resp Rate (Set): 20 breaths/min, Tidal Volume (Set, mL): 470 mL, PEEP (cm H2O): 10 cmH2O    Skin Assessment: bleeding from tongue where pt bit    Plan: continue full vent support and assess for weaning daily    RT Jeyson on 3/25/2025 at 4:06 AM

## 2025-03-25 NOTE — PROGRESS NOTES
"[facility]    Vascular Neurology Progress Note    Interval History     Overnight the patient had a few episodes of twitching and received Ativan because of that.    Hospital Course     Chief complaint: Cardiac Arrest    59 year old male with a PMH of acute on chronic hypoxemic and hypercapnic repsiratory failure, BiPAP dependence, COPD, CKD 3, HTN, HLD, RV diastolic dysfunction, tricuspid regurgitation, DM2 who presented to the ED for out-of-hospital PEA cardiac arrest s/p ROSC. On admission, CT head showed some early changes concerning for anoxic brain injury with subtle loss of gray-white differentiation.  cEEG with suppressed activity but was negative for any seizures.  On exam off sedation he is comatose, he does have preserved brainstem reflexes but with poor motor response.  Family requesting more time after we had explained the poor long-term neuro prognosis. MRI brain revealing diffuse anoxic brain injury.    Assessment and Plan     Significant anoxic brain injury secondary to cardiac arrest  Twitching movement might be myoclonus    Recommendations   -Minimize sedation   -Will start on Keppra  -Euthermic, Euglycemia, normonatremia  -Continue supportive cares  -Will be available for care conference    Patient Follow-up    - in 6 weeks with general neurology or stroke VERONICA ([phone])    We will continue to follow.     The Stroke Staff is Dr. Blanco.    Roberto Carlos Joe MD  Vascular Neurology Fellow    To page me or covering stroke neurology team member, click here: AMCOM  Choose \"On Call\" tab at top, then select \"NEUROLOGY/ALL SITES\" from middle drop-down box, press Enter, then look for \"stroke\" or \"telestroke\" for your site.    Physical Examination     Temp: 99.3  F (37.4  C) Temp src: Esophageal BP: 126/77 Pulse: 83   Resp: 18 SpO2: 94 % O2 Device: Mechanical Ventilator      Neurologic (4 score coma scale :- 7 points E1 M1 B4 R1)  Mental Status:   Comatose, not opening eyes to " voice, opening eyes to sternal rubs, does not track, does not follow commands, breathing above vent rate.   Cranial Nerves:  visual fields not intact to threat, PERRL, oculocephalic reflex present, weak cough, corneal reflex intact.   Motor: min withdrawal in R upper and lower extremities.(Likely reflexive), No withdrawal in LUE  Reflexes: Unequivocal  Coordination: Unable to test  Station/Gait:  deferred      Imaging/Labs   (Bolded imaging and labs new and/or personally reviewed or re-reviewed by me today)    MRI/Head CT CT Head   IMPRESSION:  1.  Some loss of gray-white matter differentiation involving the bilateral basal ganglia, thalami, and potentially the insular cortex compared to the previous exam. Hypoxic/ischemic injury in these locations is possible. The extent of ischemic injuries   would be more accurately evaluated by MRI.  2.  No hemorrhage, extra-axial collection, or mass effect.    MRI brain  IMPRESSION:  1.  Evidence of diffuse anoxic brain injury.  2.  No intracranial hemorrhage.   Intracranial Vasculature Not done   Cervical Vasculature Not done     Echocardiogram The left ventricle is normal in size.  Left ventricular systolic function is normal.  The visual ejection fraction is 55-60%.  Normal left ventricular wall motion  The right ventricle is normal in structure, function and size.  Mild valvular aortic stenosis. The mean AoV pressure gradient is 13mmHg.  The rhythm was atrial fibrillation.  There is no comparison study available.   EKG/Telemetry AF     LDL  No lab value available in past 30 days   A1C  No lab value available in past 90 days   Troponin No lab value available in past 48 hrs

## 2025-03-25 NOTE — PROGRESS NOTES
Care Management Follow Up    Length of Stay (days): 7    Expected Discharge Date: 03/29/2025     Concerns to be Addressed: decision making     Patient plan of care discussed at interdisciplinary rounds: Yes    Anticipated Discharge Disposition:  TBD              Anticipated Discharge Services:    Anticipated Discharge DME:      Patient/family educated on Medicare website which has current facility and service quality ratings:    Education Provided on the Discharge Plan:    Patient/Family in Agreement with the Plan: no    Referrals Placed by CM/SW:    Private pay costs discussed: Not applicable    Discussed  Partnership in Safe Discharge Planning  document with patient/family: No     Handoff Completed: No, handoff not indicated or clinically appropriate    Additional Information:  Per Michael Obrien request to schedule a Care Conference for Friday, March 28th with pt's siblings, and Neuro-crit, called and spoke to pt's brother Martínez.  Informed Martínez of plan for Care Conference on Friday with Dr. Schwartz to discuss goals of care.  Per Martínez, he would be available at 2:30PM on Friday.  Called to ICU and  has left for the day.  Called and spoke to pt's brother Martínez and informed him that RN[facility] will call tomorrow to confirm Care Conference time for Friday, March 28th at requested 2:30pm time.    Next Steps: Care Management will continue to follow for discharge planning.     PATY Byrd RN, BSN, OCN   Inpatient Care Coordination ICU  [facility]  Office: [phone]

## 2025-03-25 NOTE — PROGRESS NOTES
[facility] ICU RESPIRATORY NOTE        Date of Admission: 3/18/2025    Date of Intubation (most recent): 3/18/2025    Reason for Mechanical Ventilation: Respiratory failure    Number of Days on Mechanical Ventilation: 9    Met Criteria for Spontaneous Breathing Trial: No    Reason for No Spontaneous Breathing Trial: PEEP 9    Bite Block: No . Wing bite block tried due to pt biting tongue, but it doesn't stay in place due to missing teeth, and pt pushing the bite block out with tongue. Bedside RN and MD aware.    Significant Events Today: Vent settings changed per MD orders.     ABG Results:   Recent Labs   Lab 03/25/25  0547 03/24/25  1900 03/24/25  0901 03/24/25  0425 03/19/25  0028 03/18/25  2202 03/18/25  2142 03/18/25  1821   PH  --   --   --   --   --  7.39  --  7.30*   PCO2  --   --   --   --   --  80*  --  87*   PO2  --   --   --   --   --  70*  --  57*   HCO3  --   --   --   --   --  48*  --  42*   O2PER 60 60 100 100   < > 70   < >  --     < > = values in this interval not displayed.         Current Vent Settings: FiO2 (%): 45 %, Resp: 15, Vent Mode: CMV/AC, Resp Rate (Set): 18 breaths/min, Tidal Volume (Set, mL): 450 mL, PEEP (cm H2O): 9 cmH2O, Resp Rate (Set): 18 breaths/min, Tidal Volume (Set, mL): 450 mL, PEEP (cm H2O): 9 cmH2O    Skin Assessment: Pt has a tongue injury. Bedside RN and MD aware.     Plan: Will cont full vent support for now and will assess for weaning readiness daily.    Rand Servin, RT on 3/25/2025 at 5:51 PM

## 2025-03-25 NOTE — PROGRESS NOTES
Comprehensive Daily ICU Note        Miguel Rivera MRN# [id number]   Age: 59 year old YOB: 1965     Date of Admission: 3/18/2025    Primary care provider: No Ref-Primary, Physician     CODE STATUS: Full      Problem List:   Active Problems:    Anoxic brain damage (H)    Cardiopulmonary arrest (H)    Closed fracture of multiple ribs, unspecified laterality, initial encounter    Aspiration pneumonia (H)    Acute on chronic respiratory failure with hypoxia and hypercapnia (H)    COPD exacerbation (H)        Subjective/ Last 24 hours:   Events:   Better saturation overnight- 50%, PEEP-10 this morning   Emesis event when at goal with TF; TF hel  Issues with chewing on ETT; concern for myoclonus event and 4 IV Ativan provide with short cessation of event        Physical Examination:       Temp:  [98.4  F (36.9  C)-100  F (37.8  C)] 99  F (37.2  C)  Pulse:  [71-87] 73  Resp:  [13-24] 13  BP: (103-173)/(62-90) 147/87  FiO2 (%):  [45 %-90 %] 45 %  SpO2:  [91 %-97 %] 91 %  General: Intubated, eyes closed   HEENT: ET and OG with intermittent thick ET tube secretions  Lungs: Synchronous with ventilator, decreased breath sounds at bilateral bases and in the left lung fields, no wheezing or rhonchi auscultated  CVS: Regular rate and rhythm, no murmur  Abdomen: Protruding abdomen, non-tender  Extremities/musculoskeletal/skin: More swollen right arm, very mild swelling on left arm. No lower extremity edema.  Neurology/Psychiatry: Does not open eyes spontaneous, pupils reactive, does not follow commands, and no spontaneous movements, twitches to pain in the lower extremities, does not withdraw on upper extremities.  Gag intact, RR above set rate, does not cough with deep suction   Exam of Line sites: PICC line with clean dressing, aforementioned swelling           Medications:     Current Facility-Administered Medications   Medication Dose Route Frequency Provider Last Rate Last Admin    acetylcysteine (MUCOMYST)  20 % nebulizer solution 2 mL  2 mL Nebulization Q6H Hilton Schwartz MD   2 mL at 03/25/25 1217    ampicillin-sulbactam (UNASYN) 3 g vial to attach to  mL bag  3 g Intravenous Q6H Hilton Schwartz MD   3 g at 03/25/25 1010    budesonide (PULMICORT) neb solution 0.25 mg  0.25 mg Nebulization BID Hilton Schwartz MD   0.25 mg at 03/25/25 0720    chlorhexidine (PERIDEX) 0.12 % solution 15 mL  15 mL Mouth/Throat Q12H Annette Taylor PA-C   15 mL at 03/25/25 1002    enoxaparin ANTICOAGULANT (LOVENOX) injection 40 mg  40 mg Subcutaneous Q24H Kendra Matt MD   40 mg at 03/25/25 1340    insulin aspart (NovoLOG) injection (RAPID ACTING)  1-7 Units Subcutaneous Q4H Kam Hutchins PA-C   1 Units at 03/25/25 0438    ipratropium - albuterol 0.5 mg/2.5 mg/3 mL (DUONEB) neb solution 3 mL  3 mL Nebulization Q6H Hilton Schwartz MD   3 mL at 03/25/25 1217    levETIRAcetam (KEPPRA) intermittent infusion 1,000 mg  1,000 mg Intravenous Q12H Roberto Carlos Joe MD        methylPREDNISolone sodium succinate (SOLU-MEDROL) injection 40 mg  40 mg Intravenous Q24H Kendra Matt MD   40 mg at 03/25/25 1340    pantoprazole (PROTONIX) 2 mg/mL suspension 40 mg  40 mg Per Feeding Tube QAM Annette Leslie PA-C   40 mg at 03/24/25 0835    Or    pantoprazole (PROTONIX) IV push injection 40 mg  40 mg Intravenous QA Annette Leslie PA-C   40 mg at 03/25/25 1002    sodium chloride (PF) 0.9% PF flush 10-40 mL  10-40 mL Intracatheter Q7 Days Adriel Meléndez MD   10 mL at 03/19/25 1103    sodium chloride (PF) 0.9% PF flush 10-40 mL  10-40 mL Intracatheter Daily Adriel Meléndez MD   10 mL at 03/25/25 1004        Current Facility-Administered Medications   Medication Dose Route Frequency Provider Last Rate Last Admin    sodium chloride 0.9 % infusion   Intravenous Continuous Hilton Schwartz MD 10 mL/hr at 03/24/25 2001 New Bag at 03/24/25 2001       Current  Facility-Administered Medications   Medication Dose Route Frequency Provider Last Rate Last Admin    acetaminophen (TYLENOL) tablet 650 mg  650 mg Oral or Feeding Tube Q6H PRN Hilton Schwartz MD   650 mg at 03/24/25 0042    Or    acetaminophen (TYLENOL) oral liquid 650 mg  650 mg Per NG tube Q6H PRN Hilton Schwartz MD        albuterol (PROVENTIL) neb solution 2.5 mg  2.5 mg Nebulization Q4H PRN Annette Taylor PA-C        glucose gel 15-30 g  15-30 g Oral Q15 Min PRN Kam Hutchins PA-C        Or    dextrose 50 % injection 25-50 mL  25-50 mL Intravenous Q15 Min PRN Kam Hutchins PA-C        Or    glucagon injection 1 mg  1 mg Subcutaneous Q15 Min PRN Kam Hutchins PA-C        HYDROmorphone (DILAUDID) injection 0.2 mg  0.2 mg Intravenous Q2H PRN Annette Taylor PA-C   0.2 mg at 03/24/25 0053    [Held by provider] midazolam (VERSED) injection 4 mg  4 mg Intravenous Q1H PRN Mir Jacobson MD   4 mg at 03/18/25 1924    naloxone (NARCAN) injection 0.2 mg  0.2 mg Intravenous Q2 Min PRN Jhon Ahuja RPH        Or    naloxone (NARCAN) injection 0.4 mg  0.4 mg Intravenous Q2 Min PRN John Ahuja RPH        Or    naloxone (NARCAN) injection 0.2 mg  0.2 mg Intramuscular Q2 Min PRN John Ahuja RPH        Or    naloxone (NARCAN) injection 0.4 mg  0.4 mg Intramuscular Q2 Min PRN John Ahuja RPH        ondansetron (ZOFRAN ODT) ODT tab 4 mg  4 mg Oral Q6H PRN Annette Taylor PA-C        Or    ondansetron (ZOFRAN) injection 4 mg  4 mg Intravenous Q6H PRN Annette Taylor PA-C        polyethylene glycol (MIRALAX) Packet 17 g  17 g Oral Daily PRN Annette Taylor PA-C        senna-docusate (SENOKOT-S/PERICOLACE) 8.6-50 MG per tablet 1 tablet  1 tablet Oral BID PRN Annette Taylor PA-C        Or    senna-docusate (SENOKOT-S/PERICOLACE) 8.6-50 MG per tablet 2 tablet  2 tablet Oral BID PRN Annette Taylor PA-C        sodium chloride (PF) 0.9% PF flush 10-20 mL   10-20 mL Intracatheter q1 min Adriel Villagmoez MD             Imaging and Other Data:        XR Abdomen Port 1 View  Narrative: EXAM: XR ABDOMEN PORT 1 VIEW  LOCATION: [facility]  DATE: 3/25/2025    INDICATION: New nasaogastric tube insertion  COMPARISON: 03/24/2025  Impression: IMPRESSION:   1. Gastric drainage tube with the tip and sidehole in the stomach.             Assessment and Plan:     Summary:    59-year-old man with COPD, hypertension, kidney disease, and chronic hypercapnic and hypoxemic respiratory failure requiring home oxygen who was found unresponsive by his family with disconnected oxygen.  Unknown downtime (ast known well 1608 and found around 1625).  EMS found patient in asystole.  Had return of spontaneous circulation approximately 30 minutes after EMS arrival.  In the ED, patient was found to be severely hypercapnic and was intubated.. Admitted to ICU.  EEG showed severe encephalopathy.  Purulent secretions and hypoxic with airflow obstruction.  3/21, MRI consistent with severe anoxic encephalopathy.  EEG with very little spontaneous activity and patient unresponsive to painful stimuli.  Also being treat for strep species/aspiration pneumonia and AECOPD.     Miguel Rivera is a 59 year old male admitted on 3/18/2025 for cardiac arrest.    I have personally reviewed the daily labs, imaging studies, cultures and discussed the case with referring physician and consulting physicians. My assessment and plan as follows:    Neurology and Psychiatry:  Assessment:   Current Facility-Administered Medications   Medication Dose Route Frequency Provider Last Rate Last Admin    sodium chloride 0.9 % infusion   Intravenous Continuous Hilton Schwartz MD 10 mL/hr at 03/24/25 2001 New Bag at 03/24/25 2001     Severe anoxic encephalopathy  Prolonged cardiac arrest CPR/ACLS before ROSC (30+ minutes) by EMS with unclear time down (25 or less minutes) before EMS arrival.  CO2 immeasurably high upon arrival leaving. Post arrest 72 hr MRI brain imaging shows diffuse axonal brain injury. Poor neuro exam. cEEG previously showed minimal brain activity and no seizures. Overall, poor prognosis with no expectation for meaningful neurologic recovery and family discussions around code status and goals of care are ongoing.    Plan   GOC- after 3/23 discussion, plan was revisit after 1 week of further assessment and waiting as all siblings not in agreement, including code status. Family members intermittently asking for second opinion and for medical records- unclear if true desire for the 2nd opinion- if it is helpful for the family for closure, will pursue though unlikely to change outcome. Unclear if transfer of patient will be necessary for 2nd opinion or if phone consultation will be only avenue for pursuit.     For now, continue full code, plan will be to assess for any improvement over the next 7 days while family continues to talk amongst themselves to form consensus (the ideal scenario).  Discussion with risk management department yesterday, confirmed that in this scenario only avenue is if all siblings agree. Did suggest ethics consult but at ths time not certain they will be helpful.   NCC will need to be present for this; have been family and family most interested in what they have to say  2. Myoclonus- chewing motion- NCC starting [facility]    Cardiovascular and Hemodynamics:  Assessment:  Temp:  [98.4  F (36.9  C)-100  F (37.8  C)] 99  F (37.2  C)  Pulse:  [71-87] 73  Resp:  [13-24] 13  BP: (103-173)/(62-90) 147/87  FiO2 (%):  [45 %-90 %] 45 %  SpO2:  [91 %-97 %] 91 %         Intake/Output Summary (Last 24 hours) at 3/25/2025 1445  Last data filed at 3/25/2025 1200  Gross per 24 hour   Intake 629 ml   Output 1510 ml   Net -881 ml         Echo result w/o MOPS: Interpretation Summary The left ventricle is normal in size.Left ventricular systolic function is normal.The visual ejection  fraction is 55-60%.Normal left ventricular wall motionThe right ventricle is normal in structure, function and size.Mild valvular aortic stenosis. The mean AoV pressure gradient is 13mmHg.The rhythm was atrial fibrillation.There is no comparison study available.    Vasoplegic/cardiogenic shock, resolved  Asystolic arrest.  From history respiratory as a primary source seems most likely.  EKG not consistent with STEMI.  Troponin minimally elevated  Medical problems include chronic hypoxic/hypercapneic respiratory failure and NIV usage, hypertension, dyslipidemia and atrial fibrillation and previous diagnosis of diastolic heart failure/PH and tricuspid regurgitation (the latter two issues appear improved on recent TTE) .  Blood pressures now trending higher without need for vasopressor medications.  Ectopy that has resolved/ no recurrence of Afib since arrival  5.   Superficial cephalic vein thrombosis at PICC site.      Plan  Monitor hemodynamics closely.  Initiate antihypertensive if necessary.  Elevate PICC site/right arm for the swelling and heat over read area. Monitor for worsening signs/symptoms and remove if necessary, although access required at this time.  Hold off on A/C for Afib unless recurrence and after GOC discussion    Pulmonary/ID:  Assessment:  FiO2 (%): 45 %, Resp: 13, Vent Mode: CMV/AC, Resp Rate (Set): 18 breaths/min, Tidal Volume (Set, mL): 450 mL, PEEP (cm H2O): 9 cmH2O, Resp Rate (Set): 18 breaths/min, Tidal Volume (Set, mL): 450 mL, PEEP (cm H2O): 9 cmH2O   Arterial Blood Gas  Recent Labs   Lab 03/25/25  0547 03/24/25  1900 03/24/25  0901 03/24/25  0425 03/19/25  0028 03/18/25  2202 03/18/25  2142 03/18/25  1821   PH  --   --   --   --   --  7.39  --  7.30*   PCO2  --   --   --   --   --  80*  --  87*   PO2  --   --   --   --   --  70*  --  57*   HCO3  --   --   --   --   --  48*  --  42*   O2PER 60 60 100 100   < > 70   < >  --     < > = values in this interval not displayed.     pH Venous    Date Value Ref Range Status   03/25/2025 7.40 7.32 - 7.43 Final   03/24/2025 7.39 7.32 - 7.43 Final   03/24/2025 7.38 7.32 - 7.43 Final     pCO2 Venous   Date Value Ref Range Status   03/25/2025 55 (H) 40 - 50 mm Hg Final   03/24/2025 54 (H) 40 - 50 mm Hg Final   03/24/2025 56 (H) 40 - 50 mm Hg Final       Acute on chronic hypoxic and hypercapnic respiratory failure with home oxygen administration failure.  Aspiration pneumonia: Low-grade fever, leukocytosis, purulent secretions, Streptococcus pseudopneumoniae growing in sputum.  On ceftriaxone since 3/22 and was on Zosyn 3/20-3/22.  Extensive mucus plugging, s/p emergent bronchoscopy on 3/24 AM and repeat later 3/24 morning    Plan:  Antibiotics as below  Continue bronchodilators (Duonebs and Pulmicort) and systemic steroids and mucomyst and chest pt to help mucus plugging. Unfortunate lack of cough/strong cough effort leading to pooling of secretions  Complete 7d of IV solumederol then start prednisone taper  Continue current ventilator settings with plan to wean PEEP/FiO2 slowly- weaned PEEP-9 with FiO2-45%  VAP precautions: HOB 30, chlorhexidine  5.   Check daily VBGs to ensure appropriate vent settings   6.   Repeat bronchoscopy if increasing mucus plugging seen on intermittent CXR or increased O2 needs  7.  Bite blocks attempted to prevent chewing on tongue by pt's myoclonus    GI and Nutrition:  Assessment:  AST   Date Value Ref Range Status   03/25/2025 34 0 - 45 U/L Final   03/24/2025 47 (H) 0 - 45 U/L Final   03/23/2025 67 (H) 0 - 45 U/L Final     ALT   Date Value Ref Range Status   03/25/2025 13 0 - 70 U/L Final   03/24/2025 16 0 - 70 U/L Final   03/23/2025 17 0 - 70 U/L Final     Bilirubin Total   Date Value Ref Range Status   03/25/2025 0.2 <=1.2 mg/dL Final   03/24/2025 0.2 <=1.2 mg/dL Final   03/23/2025 0.2 <=1.2 mg/dL Final     Protein Total   Date Value Ref Range Status   03/25/2025 6.5 6.4 - 8.3 g/dL Final   03/24/2025 6.3 (L) 6.4 - 8.3 g/dL  Final   03/23/2025 6.3 (L) 6.4 - 8.3 g/dL Final     Albumin   Date Value Ref Range Status   03/25/2025 3.2 (L) 3.5 - 5.2 g/dL Final   03/24/2025 3.1 (L) 3.5 - 5.2 g/dL Final   03/23/2025 3.2 (L) 3.5 - 5.2 g/dL Final     Alkaline Phosphatase   Date Value Ref Range Status   03/25/2025 66 40 - 150 U/L Final   03/24/2025 63 40 - 150 U/L Final   03/23/2025 65 40 - 150 U/L Final     GI PPx  Nutritional Plan: Tube feeds   Transaminitis- resolved     Plan:   Monitor   Restart tube feeds but at low rate today and slowly increase tomorrow  Stress ulcer: PPI    Renal, Fluid and Electrolytes:  Assessment:  BMP  Recent Labs   Lab 03/25/25  0506 03/24/25  0558 03/24/25  0425 03/23/25  0434 03/22/25  0427   * 133*  --  134* 137   POTASSIUM 4.3 4.8  --  4.5 4.5  4.4   CHLORIDE 94* 96*  --  97* 99   CO2 34* 30*  --  29 29   ANIONGAP 4* 7  --  8 9   BUN 27.2* 22.5  --  20.0 25.4*   CR 0.54* 0.56*  --  0.64* 0.69   GFRESTIMATED >90 >90  --  >90 >90   LOLA 8.6* 8.4*  --  8.3* 8.4*   MAG 1.9  --  2.1 1.7 1.8   PHOS 2.6  --  3.7 3.1 1.9*     Lactic Acid:  Lactic Acid   Date Value Ref Range Status   03/20/2025 0.8 0.7 - 2.0 mmol/L Final   03/19/2025 0.7 0.7 - 2.0 mmol/L Final   03/19/2025 2.4 (H) 0.7 - 2.0 mmol/L Final       Intake/Output Summary (Last 24 hours) at 3/25/2025 1452  Last data filed at 3/25/2025 1200  Gross per 24 hour   Intake 629 ml   Output 1510 ml   Net -881 ml         Chronic respiratory acidosis with metabolic compensation, improved PvCO2 since admission.  Urine output adequate  Mild Hyponatremia    Plan:   Replace electrolytes if abnormal  Decrease FWF by half  Monitor I/O, urine output, kidney function and electrolytes and treat as needed. Avoid nephrotoxic agents.  Straight cath    Infectious Disease:  Assessment:  7-Day Micro Results       Collected Updated Procedure Result Status      03/24/2025 1640 03/25/2025 1114 Respiratory Aerobic Bacterial Culture [[id number]]   Bronchial Alveolar Lavage from Lung,  Lower Lobe, Left    Preliminary result Component Value   Culture No growth, less than 1 day  [P]    Gram Stain Result >25 PMNs/low power field  [P]     No organisms seen  [P]                03/19/2025 0426 03/21/2025 0756 Respiratory Aerobic Bacterial Culture with Gram Stain [[id number]]   (Abnormal)   Aspirate from Endotracheal    Final result Component Value   Culture 4+ Normal Jorge   Gram Stain Result >25 PMNs/low power field    4+ Gram positive bacilli resembling diphtheroids    1+ Gram positive cocci               03/18/2025 2207 03/22/2025 1418 Respiratory Aerobic Bacterial Culture with Gram Stain [[id number]]    (Abnormal)   Sputum from Endotracheal    Final result Component Value   Culture 4+ Streptococcus pseudopneumoniae    Identification obtained by MALDI-TOF mass spectrometry research use only database. Test characteristics determined and verified by the [facility].    4+ Normal jorge   Gram Stain Result >25 PMNs/low power field    3+ Gram positive bacilli resembling diphtheroids    1+ Gram positive cocci        Susceptibility        Streptococcus pseudopneumoniae      WALLY      Ceftriaxone (meningitis) <=0.25 ug/mL Susceptible      Ceftriaxone (non-meningitis) <=0.25 ug/mL Susceptible      Clindamycin <=0.12 ug/mL Susceptible  [1]       Erythromycin >2 ug/mL Resistant      Levofloxacin <=1 ug/mL Susceptible      Penicillin (meningitis) <=0.06 ug/mL Susceptible      Penicillin (non-meningitis) <=0.06 ug/mL Susceptible      Penicillin(oral) <=0.06 ug/mL Susceptible      Vancomycin 0.5 ug/mL Susceptible                   [1]  This isolate DOES NOT demonstrate inducible clindamycin resistance in vitro. Clindamycin is susceptible and could be used when indicated, however, erythromycin is resistant and should not be used.                    03/18/2025 1728 03/23/2025 2316 Blood Culture Peripheral Blood [[id number]]   Peripheral Blood    Final result Component Value  "  Culture No Growth               03/18/2025 1728 03/23/2025 2006 Blood Culture Peripheral Blood [[id number]]   Peripheral Blood    Final result Component Value   Culture No Growth                     Aspiration pneumonia: fevers and leukocytosis has resolved, continues to have purulent secretions though improved following bronchoscopy, Streptococcus growing in sputum previously       Since mucus plugging event, has leukocytosis.   2. Fever- 3/24 with fever curve now down trending again following change in antibiotics and bronchoscopy for airway clearance    Plan:  Narrowed antibiotics from Zosyn to ceftriaxone on 3/22 but changed to Unasyn 3/24 following extensive mucus plugging  In total, day 6 of antibiotics today. Will do extended course of 10d total of antibiotics  2. Follow up bronchial wash culture from 3/24- [facility]    Hematology and Oncology:  Assessment:  Recent Labs   Lab 03/25/25  0506 03/24/25  1419 03/23/25  0434 03/22/25  0116   WBC 17.4* 15.1* 9.3 10.7   RBC 3.42* 3.49* 3.26* 3.05*   HGB 9.6* 9.7* 9.2* 8.7*   HCT 31.2* 32.4* 30.3* 29.5*   MCV 91 93 93 97   MCH 28.1 27.8 28.2 28.5   MCHC 30.8* 29.9* 30.4* 29.5*   RDW 14.2 14.4 14.5 14.6    258 257 257  257     No lab results found in last 7 days.  No results found for: \"AAUFH\"    1.  Moderate anemia.  No signs of bleeding or hemodynamic instability.  2.  Leukocytosis- likely 2/2 pulmonary/ID process      Plan:  Anticoagulation:  Lovenox and SCDs    Endocrinology and Rheumatology:  Assessment:  Stress hyperglycemia  Recent Labs   Lab 03/25/25  1348 03/25/25  0946 03/25/25  0506 03/25/25  0435 03/24/25  2353 03/24/25  2004   * 111* 145* 140* 129* 143*      Plan:  Insulin orders in place.  Has not required  Steroids    Skin and Musculoskeletal:  Assessment:  Standard skin cares    Family update: Updated by RN/MD as able    Clinically Significant Risk Factors     # Obesity: Estimated body mass index is 39.19 kg/m  as calculated from the " "following:    Height as of this encounter: 1.575 m (5' 2\").    Weight as of this encounter: 97.2 kg (214 lb 4.6 oz).  # Severe Malnutrition: based on nutrition assessment and treatment provided per dietitian's recommendations.        [facility] ICU Rounding checklist    Assessment of central venous catheter access Central access present and needed   Assessment of urinary catheter use Intermittent straight cath   DVT prophylaxis Subcutaneous heparin product (heparin or LMWH)     [facility]  CRITICAL CARE STAFF NOTE    I am managing these acute and ongoing critical issues resulting in critical condition that impairs one or more vital organ systems, incur a high probability of imminent or life-threatening deterioration in the patient's condition and providing frequent personal assessment and manipulation of medications and life support equipment.     1. Out of hospital cardiac arrest  2. Acute on chronic hypoxic and hypercapnic respiratory failure   3. Diffuse anoxic devastating brain injury   4. Aspiration pneumonia  5. Extensive mucus plugging.     ICU Interventions: ventilator management and interpretation of lab values, cardiac output, cxr, pulse oximetry, blood gases, and/or information/data stored in computers    The patient was seen and examined with the resident/fellow/VERONICA and/or medical student.  We have discussed the patient in detail and I agree with the findings, assessment, and plan as documented when this note was cosigned on this day. The plan was formulated in conjunction with pharmacy, ICU nurses, and respiratory therapist. I have evaluated all laboratory values and imaging studies for the past 24 hours. I have reviewed all the consults that have been ordered and are active for this patient.      Critical Care Time: 60  min.  I spent this time (excluding procedures) personally providing and directing critical care services at the bedside and on the critical care unit.      Hilton Schwartz, " MD   of Medicine, Pulmonary/CC  March 25, 2025

## 2025-03-25 NOTE — PLAN OF CARE
"Goal Outcome Evaluation:      Plan of Care Reviewed With: patient    Overall Patient Progress: decliningOverall Patient Progress: declining    Outcome Evaluation: Pt on 60% overnight. Pt has been biting tounge and bleeding throughout night. RT placed bite block but was not able to keep it in for long. Pt has been biting chunks of tounge off. Intensivist aware and stated could be potential seizure activity. Ordered 4mg of IV ativan. Hgb stable this AM. Straight cathed around 0400. 700cc out from straight cath. Next bladder scan for 1000. Still not following commands, does not withdrawl from pain on upper extremeities.      Problem: Adult Inpatient Plan of Care  Goal: Plan of Care Review  Description: The Plan of Care Review/Shift note should be completed every shift.  The Outcome Evaluation is a brief statement about your assessment that the patient is improving, declining, or no change.  This information will be displayed automatically on your shift  note.  Outcome: Not Progressing  Flowsheets (Taken 3/25/2025 0614)  Outcome Evaluation: Pt on 60% overnight. Pt has been biting tounge and bleeding throughout night. RT placed bite block but was not able to keep it in for long. Pt has been biting chunks of tounge off. Intensivist aware and stated could be potential seizure activity. Ordered 4mg of IV ativan. Hgb stable this AM. Straight cathed around 0400. 700cc out from straight cath. Next bladder scan for 1000. Still not following commands, does not withdrawl from pain on upper extremeities.  Plan of Care Reviewed With: patient  Overall Patient Progress: declining  Goal: Patient-Specific Goal (Individualized)  Description: You can add care plan individualizations to a care plan. Examples of Individualization might be:  \"Parent requests to be called daily at 9am for status\", \"I have a hard time hearing out of my right ear\", or \"Do not touch me to wake me up as it startles  me\".  Outcome: Not Progressing  Goal: Absence " of Hospital-Acquired Illness or Injury  Outcome: Not Progressing  Intervention: Identify and Manage Fall Risk  Recent Flowsheet Documentation  Taken 3/25/2025 0400 by Karen Enriquez RN  Safety Promotion/Fall Prevention:   clutter free environment maintained   room door open   room near nurse's station   room organization consistent   safety round/check completed  Taken 3/25/2025 0000 by Karen Enriquez RN  Safety Promotion/Fall Prevention:   clutter free environment maintained   room door open   room near nurse's station   room organization consistent   safety round/check completed  Taken 3/24/2025 2000 by Karen Enriquez RN  Safety Promotion/Fall Prevention:   clutter free environment maintained   room door open   room near nurse's station   room organization consistent   safety round/check completed  Intervention: Prevent Skin Injury  Recent Flowsheet Documentation  Taken 3/25/2025 0600 by Karen Enriquez RN  Body Position:   turned   heels elevated   upper extremity elevated  Taken 3/25/2025 0400 by Karen Enriquez RN  Body Position:   turned   heels elevated   upper extremity elevated  Skin Protection:   adhesive use limited   incontinence pads utilized   pulse oximeter probe site changed   silicone foam dressing in place   skin to device areas padded   transparent dressing maintained   tubing/devices free from skin contact  Taken 3/25/2025 0200 by Karen Enriquez RN  Body Position:   turned   heels elevated   upper extremity elevated  Taken 3/25/2025 0000 by Karen Enriquez RN  Body Position:   turned   heels elevated   upper extremity elevated  Skin Protection:   adhesive use limited   incontinence pads utilized   pulse oximeter probe site changed   silicone foam dressing in place   skin to device areas padded   transparent dressing maintained   tubing/devices free from skin contact  Taken 3/24/2025 2200 by Karen Enriquez RN  Body Position:   turned   heels elevated   upper extremity elevated  Taken  3/24/2025 2000 by Karen Enriquez RN  Body Position:   turned   heels elevated   upper extremity elevated  Skin Protection:   adhesive use limited   incontinence pads utilized   pulse oximeter probe site changed   silicone foam dressing in place   skin to device areas padded   transparent dressing maintained   tubing/devices free from skin contact  Intervention: Prevent and Manage VTE (Venous Thromboembolism) Risk  Recent Flowsheet Documentation  Taken 3/25/2025 0400 by Karen Enriquez RN  VTE Prevention/Management: SCDs on (sequential compression devices)  Taken 3/25/2025 0000 by Karen Enriquez RN  VTE Prevention/Management: SCDs on (sequential compression devices)  Taken 3/24/2025 2000 by Karen Enriquez RN  VTE Prevention/Management: SCDs on (sequential compression devices)  Goal: Optimal Comfort and Wellbeing  Outcome: Not Progressing  Intervention: Monitor Pain and Promote Comfort  Recent Flowsheet Documentation  Taken 3/25/2025 0400 by Karen Enriquez RN  Pain Management Interventions:   repositioned   rest  Taken 3/25/2025 0000 by Karen Enriquez RN  Pain Management Interventions:   repositioned   rest  Taken 3/24/2025 2000 by Karen Enriquez RN  Pain Management Interventions:   repositioned   rest  Intervention: Provide Person-Centered Care  Recent Flowsheet Documentation  Taken 3/25/2025 0400 by Karen Enriquez RN  Trust Relationship/Rapport:   care explained   reassurance provided   emotional support provided  Taken 3/25/2025 0000 by Karen Enriquez RN  Trust Relationship/Rapport:   care explained   reassurance provided   emotional support provided  Taken 3/24/2025 2000 by Karen Enriquez RN  Trust Relationship/Rapport:   care explained   reassurance provided   emotional support provided  Goal: Readiness for Transition of Care  Outcome: Not Progressing     Problem: Mechanical Ventilation Invasive  Goal: Effective Communication  Outcome: Not Progressing  Intervention: Ensure Effective  Communication  Recent Flowsheet Documentation  Taken 3/25/2025 0400 by Karen Enriquez, RN  Trust Relationship/Rapport:   care explained   reassurance provided   emotional support provided  Taken 3/25/2025 0000 by Karen Enriquez RN  Trust Relationship/Rapport:   care explained   reassurance provided   emotional support provided  Taken 3/24/2025 2000 by Karen Enriquez RN  Trust Relationship/Rapport:   care explained   reassurance provided   emotional support provided  Goal: Optimal Device Function  Outcome: Not Progressing  Intervention: Optimize Device Care and Function  Recent Flowsheet Documentation  Taken 3/25/2025 0600 by Karen Enriquez, RN  Oral Care:   swabbed with antiseptic solution   suction provided   teeth brushed  Taken 3/25/2025 0400 by Karen Enriquez RN  Airway/Ventilation Management:   airway patency maintained   calming measures promoted   humidification applied   oxygen therapy provided   positive pressure ventilation provided   pulmonary hygiene promoted  Oral Care:   swabbed with antiseptic solution   suction provided   teeth brushed  Taken 3/25/2025 0200 by Karen Enriquez RN  Oral Care:   swabbed with antiseptic solution   suction provided   teeth brushed  Taken 3/25/2025 0000 by Karen Enriquez RN  Airway/Ventilation Management:   airway patency maintained   calming measures promoted   humidification applied   oxygen therapy provided   positive pressure ventilation provided   pulmonary hygiene promoted  Oral Care:   swabbed with antiseptic solution   suction provided   teeth brushed  Taken 3/24/2025 2200 by Karen Enriquez RN  Oral Care:   swabbed with antiseptic solution   suction provided   teeth brushed  Taken 3/24/2025 2000 by Karen Enriquez RN  Airway/Ventilation Management:   airway patency maintained   calming measures promoted   humidification applied   oxygen therapy provided   positive pressure ventilation provided   pulmonary hygiene promoted  Oral Care:   swabbed with  antiseptic solution   suction provided   teeth brushed  Goal: Mechanical Ventilation Liberation  Outcome: Not Progressing  Intervention: Promote Extubation and Mechanical Ventilation Liberation  Recent Flowsheet Documentation  Taken 3/25/2025 0400 by Karen Enriquez RN  Sleep/Rest Enhancement:   comfort measures   relaxation techniques promoted  Medication Review/Management:   medications reviewed   high-risk medications identified  Environmental Support: calm environment promoted  Taken 3/25/2025 0000 by Karen Enriquez, RN  Sleep/Rest Enhancement:   comfort measures   relaxation techniques promoted  Medication Review/Management:   medications reviewed   high-risk medications identified  Environmental Support: calm environment promoted  Taken 3/24/2025 2000 by Karen Enriquez RN  Sleep/Rest Enhancement:   comfort measures   relaxation techniques promoted  Medication Review/Management:   medications reviewed   high-risk medications identified  Environmental Support: calm environment promoted  Goal: Optimal Nutrition Delivery  Outcome: Not Progressing  Intervention: Optimize Nutrition Delivery  Recent Flowsheet Documentation  Taken 3/25/2025 0400 by Karen Enriquez RN  Nutrition Support Management: weight trending reviewed  Taken 3/25/2025 0000 by Karen Enriquez RN  Nutrition Support Management: weight trending reviewed  Taken 3/24/2025 2000 by Karen Enriquez RN  Nutrition Support Management: weight trending reviewed  Goal: Absence of Device-Related Skin and Tissue Injury  Outcome: Not Progressing  Intervention: Maintain Skin and Tissue Health  Recent Flowsheet Documentation  Taken 3/25/2025 0400 by Karen Enriquez RN  Device Skin Pressure Protection:   absorbent pad utilized/changed   adhesive use limited   positioning supports utilized   pressure points protected   skin-to-device areas padded   tubing/devices free from skin contact  Taken 3/25/2025 0000 by Karen Enriquez RN  Device Skin Pressure  Protection:   absorbent pad utilized/changed   adhesive use limited   positioning supports utilized   pressure points protected   skin-to-device areas padded   tubing/devices free from skin contact  Taken 3/24/2025 2000 by Karen Enriquez RN  Device Skin Pressure Protection:   absorbent pad utilized/changed   adhesive use limited   positioning supports utilized   pressure points protected   skin-to-device areas padded   tubing/devices free from skin contact  Goal: Absence of Ventilator-Induced Lung Injury  Outcome: Not Progressing  Intervention: Prevent Ventilator-Associated Pneumonia  Recent Flowsheet Documentation  Taken 3/25/2025 0600 by Karen Enriquez RN  Oral Care:   swabbed with antiseptic solution   suction provided   teeth brushed  Head of Bed (HOB) Positioning: HOB at 30 degrees  Taken 3/25/2025 0400 by Karen Enriquez RN  VAP Prevention Bundle:   HOB elevation maintained   oral care regularly provided   readiness to extubate assessed   stress ulcer prophylaxis provided   vent circuit breaks minimized   VTE prophylaxis provided  Oral Care:   swabbed with antiseptic solution   suction provided   teeth brushed  Head of Bed (HOB) Positioning: HOB at 30 degrees  VAP Prevention Measures: completed  Taken 3/25/2025 0200 by Karen Enriquez RN  Oral Care:   swabbed with antiseptic solution   suction provided   teeth brushed  Head of Bed (HOB) Positioning: HOB at 30 degrees  Taken 3/25/2025 0000 by Karen Enriquez RN  VAP Prevention Bundle:   HOB elevation maintained   oral care regularly provided   readiness to extubate assessed   stress ulcer prophylaxis provided   vent circuit breaks minimized   VTE prophylaxis provided  Oral Care:   swabbed with antiseptic solution   suction provided   teeth brushed  Head of Bed (HOB) Positioning: HOB at 30 degrees  VAP Prevention Measures: completed  Taken 3/24/2025 2200 by Karen Enriquez RN  Oral Care:   swabbed with antiseptic solution   suction provided   teeth  brushed  Head of Bed (HOB) Positioning: HOB at 30 degrees  Taken 3/24/2025 2000 by Karen Enriquez RN  VAP Prevention Bundle:   HOB elevation maintained   oral care regularly provided   readiness to extubate assessed   stress ulcer prophylaxis provided   vent circuit breaks minimized   VTE prophylaxis provided  Oral Care:   swabbed with antiseptic solution   suction provided   teeth brushed  Head of Bed (HOB) Positioning: HOB at 30 degrees  VAP Prevention Measures: completed     Problem: Comorbidity Management  Goal: Maintenance of Heart Failure Symptom Control  Outcome: Not Progressing  Intervention: Maintain Heart Failure Management  Recent Flowsheet Documentation  Taken 3/25/2025 0400 by Karen Enriquez RN  Medication Review/Management:   medications reviewed   high-risk medications identified  Taken 3/25/2025 0000 by Karen Enriquez RN  Medication Review/Management:   medications reviewed   high-risk medications identified  Taken 3/24/2025 2000 by Karen Enriquez RN  Medication Review/Management:   medications reviewed   high-risk medications identified     Problem: Risk for Delirium  Goal: Optimal Coping  Outcome: Not Progressing  Intervention: Optimize Psychosocial Adjustment to Delirium  Recent Flowsheet Documentation  Taken 3/25/2025 0400 by Karen Enrqiuez RN  Supportive Measures: relaxation techniques promoted  Taken 3/25/2025 0000 by Karen Enriquez RN  Supportive Measures: relaxation techniques promoted  Taken 3/24/2025 2000 by Karen Enriquez RN  Supportive Measures: relaxation techniques promoted  Goal: Improved Behavioral Control  Outcome: Not Progressing  Intervention: Prevent and Manage Agitation  Recent Flowsheet Documentation  Taken 3/25/2025 0400 by Karen Enriquez RN  Environment Familiarity/Consistency: daily routine followed  Taken 3/25/2025 0000 by Karen Enriquez RN  Environment Familiarity/Consistency: daily routine followed  Taken 3/24/2025 2000 by Karen Enriquez RN  Environment  Familiarity/Consistency: daily routine followed  Intervention: Minimize Safety Risk  Recent Flowsheet Documentation  Taken 3/25/2025 0400 by Karen Enriquez RN  Enhanced Safety Measures:   review medications for side effects with activity   room near unit station  Trust Relationship/Rapport:   care explained   reassurance provided   emotional support provided  Taken 3/25/2025 0000 by Karen Enriquez RN  Enhanced Safety Measures:   review medications for side effects with activity   room near unit station  Trust Relationship/Rapport:   care explained   reassurance provided   emotional support provided  Taken 3/24/2025 2000 by Karen Enriquez RN  Enhanced Safety Measures:   review medications for side effects with activity   room near unit station  Trust Relationship/Rapport:   care explained   reassurance provided   emotional support provided  Goal: Improved Attention and Thought Clarity  Outcome: Not Progressing  Intervention: Maximize Cognitive Function  Recent Flowsheet Documentation  Taken 3/25/2025 0400 by Karen Enriquez RN  Sensory Stimulation Regulation:   care clustered   lighting decreased   television on  Reorientation Measures:   clock in view   reorientation provided  Taken 3/25/2025 0000 by Karen Enriquez RN  Sensory Stimulation Regulation:   care clustered   lighting decreased   television on  Reorientation Measures:   clock in view   reorientation provided  Taken 3/24/2025 2000 by Karen Enriquez RN  Sensory Stimulation Regulation:   care clustered   lighting decreased   television on  Reorientation Measures:   clock in view   reorientation provided  Goal: Improved Sleep  Outcome: Not Progressing  Intervention: Promote Sleep  Recent Flowsheet Documentation  Taken 3/25/2025 0400 by Karen Enriquez RN  Sleep/Rest Enhancement:   comfort measures   relaxation techniques promoted  Taken 3/25/2025 0000 by Karen Enriquez RN  Sleep/Rest Enhancement:   comfort measures   relaxation techniques  promoted  Taken 3/24/2025 2000 by Karen Enriquez RN  Sleep/Rest Enhancement:   comfort measures   relaxation techniques promoted     Problem: Skin Injury Risk Increased  Goal: Skin Health and Integrity  Outcome: Not Progressing  Intervention: Plan: Nurse Driven Intervention: Positioning  Recent Flowsheet Documentation  Taken 3/25/2025 0400 by Karen Enriquez RN  Plan: Positioning Interventions:   REPOSITION Left/Right (No supine) q2h   Use wedge positioners   HOB 30 degrees or less   OFF-LOAD HEELS with pillows  Taken 3/25/2025 0000 by Karen Enriquez RN  Plan: Positioning Interventions:   REPOSITION Left/Right (No supine) q2h   Use wedge positioners   HOB 30 degrees or less   OFF-LOAD HEELS with pillows  Taken 3/24/2025 2000 by Karen Enriquez RN  Plan: Positioning Interventions:   REPOSITION Left/Right (No supine) q2h   Use wedge positioners   HOB 30 degrees or less   OFF-LOAD HEELS with pillows  Intervention: Plan: Nurse Driven Intervention: Moisture Management  Recent Flowsheet Documentation  Taken 3/25/2025 0400 by Karen Enriquez RN  Moisture Interventions:   No brief in bed   Incontinence pad   Urinary collection device  Taken 3/25/2025 0200 by Karen Enriquez RN  Bathing/Skin Care:   back care   bath, complete   dressed/undressed   electrode patches/site rotation   incontinence care   linen changed  Taken 3/25/2025 0000 by Karen Enriquez RN  Moisture Interventions:   No brief in bed   Incontinence pad   Urinary collection device  Taken 3/24/2025 2000 by Karen Enriquez RN  Moisture Interventions:   No brief in bed   Incontinence pad   Urinary collection device  Intervention: Plan: Nurse Driven Intervention: Friction and Shear  Recent Flowsheet Documentation  Taken 3/25/2025 0400 by Karen Enriquez RN  Friction/Shear Interventions:   HOB 30 degrees or less   Lateral transfer device (hovermat, etc.)  Taken 3/25/2025 0000 by Karen Enriquez RN  Friction/Shear Interventions:   HOB 30 degrees or  less   Lateral transfer device (hovermat, etc.)  Taken 3/24/2025 2000 by Karen Enriquez RN  Friction/Shear Interventions:   HOB 30 degrees or less   Lateral transfer device (hovermat, etc.)  Intervention: Optimize Skin Protection  Recent Flowsheet Documentation  Taken 3/25/2025 0600 by Karen Enriquez RN  Head of Bed ([facility]) Positioning: HOB at 30 degrees  Taken 3/25/2025 0400 by Karen Enriquez RN  Skin Protection:   adhesive use limited   incontinence pads utilized   pulse oximeter probe site changed   silicone foam dressing in place   skin to device areas padded   transparent dressing maintained   tubing/devices free from skin contact  Activity Management: bedrest  Head of Bed (HOB) Positioning: HOB at 30 degrees  Taken 3/25/2025 0200 by Karen Enriquez RN  Activity Management: bedrest  Head of Bed ([facility]) Positioning: HOB at 30 degrees  Taken 3/25/2025 0000 by Karen Enriquez RN  Skin Protection:   adhesive use limited   incontinence pads utilized   pulse oximeter probe site changed   silicone foam dressing in place   skin to device areas padded   transparent dressing maintained   tubing/devices free from skin contact  Activity Management: bedrest  Head of Bed (HOB) Positioning: HOB at 30 degrees  Taken 3/24/2025 2200 by Karen Enriquez RN  Head of Bed ([facility]) Positioning: HOB at 30 degrees  Taken 3/24/2025 2000 by Karen Enriquez RN  Skin Protection:   adhesive use limited   incontinence pads utilized   pulse oximeter probe site changed   silicone foam dressing in place   skin to device areas padded   transparent dressing maintained   tubing/devices free from skin contact  Activity Management: bedrest  Head of Bed (HOB) Positioning: HOB at 30 degrees

## 2025-03-26 LAB
ALBUMIN SERPL BCG-MCNC: 2.6 G/DL (ref 3.5–5.2)
ANION GAP SERPL CALCULATED.3IONS-SCNC: 7 MMOL/L (ref 7–15)
BACTERIA BRONCH: NO GROWTH
BASE EXCESS BLDV CALC-SCNC: 6.1 MMOL/L (ref -3–3)
BASE EXCESS BLDV CALC-SCNC: 6.2 MMOL/L (ref -3–3)
BUN SERPL-MCNC: 22.6 MG/DL (ref 8–23)
CALCIUM SERPL-MCNC: 7.8 MG/DL (ref 8.8–10.4)
CHLORIDE SERPL-SCNC: 94 MMOL/L (ref 98–107)
CREAT SERPL-MCNC: 0.45 MG/DL (ref 0.67–1.17)
EGFRCR SERPLBLD CKD-EPI 2021: >90 ML/MIN/1.73M2
ERYTHROCYTE [DISTWIDTH] IN BLOOD BY AUTOMATED COUNT: 14.5 % (ref 10–15)
ERYTHROCYTE [DISTWIDTH] IN BLOOD BY AUTOMATED COUNT: 14.6 % (ref 10–15)
GLUCOSE BLDC GLUCOMTR-MCNC: 120 MG/DL (ref 70–99)
GLUCOSE BLDC GLUCOMTR-MCNC: 121 MG/DL (ref 70–99)
GLUCOSE BLDC GLUCOMTR-MCNC: 128 MG/DL (ref 70–99)
GLUCOSE BLDC GLUCOMTR-MCNC: 139 MG/DL (ref 70–99)
GLUCOSE BLDC GLUCOMTR-MCNC: 148 MG/DL (ref 70–99)
GLUCOSE SERPL-MCNC: 118 MG/DL (ref 70–99)
GLUCOSE SERPL-MCNC: 135 MG/DL (ref 70–99)
GRAM STAIN RESULT: NORMAL
GRAM STAIN RESULT: NORMAL
HCO3 BLDV-SCNC: 32 MMOL/L (ref 21–28)
HCO3 BLDV-SCNC: 34 MMOL/L (ref 21–28)
HCO3 SERPL-SCNC: 28 MMOL/L (ref 22–29)
HCT VFR BLD AUTO: 27.8 % (ref 40–53)
HCT VFR BLD AUTO: 31.5 % (ref 40–53)
HGB BLD-MCNC: 8.4 G/DL (ref 13.3–17.7)
HGB BLD-MCNC: 9.6 G/DL (ref 13.3–17.7)
MAGNESIUM SERPL-MCNC: 2.2 MG/DL (ref 1.7–2.3)
MCH RBC QN AUTO: 28 PG (ref 26.5–33)
MCH RBC QN AUTO: 28 PG (ref 26.5–33)
MCHC RBC AUTO-ENTMCNC: 30.2 G/DL (ref 31.5–36.5)
MCHC RBC AUTO-ENTMCNC: 30.5 G/DL (ref 31.5–36.5)
MCV RBC AUTO: 92 FL (ref 78–100)
MCV RBC AUTO: 93 FL (ref 78–100)
O2/TOTAL GAS SETTING VFR VENT: 40 %
O2/TOTAL GAS SETTING VFR VENT: 50 %
OXYHGB MFR BLDV: 78 % (ref 70–75)
OXYHGB MFR BLDV: 85 % (ref 70–75)
PCO2 BLDV: 52 MM HG (ref 40–50)
PCO2 BLDV: 68 MM HG (ref 40–50)
PH BLDV: 7.31 [PH] (ref 7.32–7.43)
PH BLDV: 7.4 [PH] (ref 7.32–7.43)
PHOSPHATE SERPL-MCNC: 2.4 MG/DL (ref 2.5–4.5)
PHOSPHATE SERPL-MCNC: 3.2 MG/DL (ref 2.5–4.5)
PLATELET # BLD AUTO: 314 10E3/UL (ref 150–450)
PLATELET # BLD AUTO: 378 10E3/UL (ref 150–450)
PO2 BLDV: 52 MM HG (ref 25–47)
PO2 BLDV: 52 MM HG (ref 25–47)
POTASSIUM SERPL-SCNC: 4.3 MMOL/L (ref 3.4–5.3)
POTASSIUM SERPL-SCNC: 4.5 MMOL/L (ref 3.4–5.3)
RBC # BLD AUTO: 3 10E6/UL (ref 4.4–5.9)
RBC # BLD AUTO: 3.43 10E6/UL (ref 4.4–5.9)
SAO2 % BLDV: 80.4 % (ref 70–75)
SAO2 % BLDV: 87.1 % (ref 70–75)
SODIUM SERPL-SCNC: 129 MMOL/L (ref 135–145)
SODIUM SERPL-SCNC: 131 MMOL/L (ref 135–145)
SODIUM SERPL-SCNC: 136 MMOL/L (ref 135–145)
WBC # BLD AUTO: 16.1 10E3/UL (ref 4–11)
WBC # BLD AUTO: 17.1 10E3/UL (ref 4–11)

## 2025-03-26 PROCEDURE — 82805 BLOOD GASES W/O2 SATURATION: CPT | Performed by: INTERNAL MEDICINE

## 2025-03-26 PROCEDURE — 84100 ASSAY OF PHOSPHORUS: CPT | Performed by: SURGERY

## 2025-03-26 PROCEDURE — 999N000157 HC STATISTIC RCP TIME EA 10 MIN

## 2025-03-26 PROCEDURE — 82947 ASSAY GLUCOSE BLOOD QUANT: CPT | Performed by: PSYCHIATRY & NEUROLOGY

## 2025-03-26 PROCEDURE — 83735 ASSAY OF MAGNESIUM: CPT | Performed by: SURGERY

## 2025-03-26 PROCEDURE — 85027 COMPLETE CBC AUTOMATED: CPT | Performed by: INTERNAL MEDICINE

## 2025-03-26 PROCEDURE — 94640 AIRWAY INHALATION TREATMENT: CPT | Mod: 76

## 2025-03-26 PROCEDURE — 250N000009 HC RX 250: Performed by: INTERNAL MEDICINE

## 2025-03-26 PROCEDURE — 99291 CRITICAL CARE FIRST HOUR: CPT | Mod: GC | Performed by: PSYCHIATRY & NEUROLOGY

## 2025-03-26 PROCEDURE — 250N000011 HC RX IP 250 OP 636: Performed by: SURGERY

## 2025-03-26 PROCEDURE — 84295 ASSAY OF SERUM SODIUM: CPT | Performed by: STUDENT IN AN ORGANIZED HEALTH CARE EDUCATION/TRAINING PROGRAM

## 2025-03-26 PROCEDURE — 200N000001 HC R&B ICU

## 2025-03-26 PROCEDURE — 250N000013 HC RX MED GY IP 250 OP 250 PS 637

## 2025-03-26 PROCEDURE — 250N000011 HC RX IP 250 OP 636: Performed by: INTERNAL MEDICINE

## 2025-03-26 PROCEDURE — 84132 ASSAY OF SERUM POTASSIUM: CPT | Performed by: SURGERY

## 2025-03-26 PROCEDURE — 85041 AUTOMATED RBC COUNT: CPT | Performed by: INTERNAL MEDICINE

## 2025-03-26 PROCEDURE — 99291 CRITICAL CARE FIRST HOUR: CPT | Performed by: INTERNAL MEDICINE

## 2025-03-26 PROCEDURE — 82040 ASSAY OF SERUM ALBUMIN: CPT | Performed by: INTERNAL MEDICINE

## 2025-03-26 PROCEDURE — 85018 HEMOGLOBIN: CPT | Performed by: INTERNAL MEDICINE

## 2025-03-26 PROCEDURE — 99292 CRITICAL CARE ADDL 30 MIN: CPT | Performed by: INTERNAL MEDICINE

## 2025-03-26 PROCEDURE — 999N000254 HC STATISTIC VENTILATOR TRANSFER

## 2025-03-26 PROCEDURE — 82947 ASSAY GLUCOSE BLOOD QUANT: CPT | Performed by: INTERNAL MEDICINE

## 2025-03-26 PROCEDURE — 999N000253 HC STATISTIC WEANING TRIALS

## 2025-03-26 PROCEDURE — 250N000009 HC RX 250

## 2025-03-26 PROCEDURE — 82435 ASSAY OF BLOOD CHLORIDE: CPT | Performed by: INTERNAL MEDICINE

## 2025-03-26 PROCEDURE — 250N000011 HC RX IP 250 OP 636: Performed by: STUDENT IN AN ORGANIZED HEALTH CARE EDUCATION/TRAINING PROGRAM

## 2025-03-26 PROCEDURE — 250N000012 HC RX MED GY IP 250 OP 636 PS 637: Performed by: INTERNAL MEDICINE

## 2025-03-26 PROCEDURE — 94003 VENT MGMT INPAT SUBQ DAY: CPT

## 2025-03-26 PROCEDURE — 94640 AIRWAY INHALATION TREATMENT: CPT

## 2025-03-26 PROCEDURE — 250N000013 HC RX MED GY IP 250 OP 250 PS 637: Performed by: NEUROLOGICAL SURGERY

## 2025-03-26 RX ORDER — 3% SODIUM CHLORIDE 3 G/100ML
INJECTION, SOLUTION INTRAVENOUS CONTINUOUS
Status: DISCONTINUED | OUTPATIENT
Start: 2025-03-26 | End: 2025-03-26

## 2025-03-26 RX ORDER — SODIUM CHLORIDE 3 G/100ML
100 INJECTION, SOLUTION INTRAVENOUS ONCE
Status: CANCELLED | OUTPATIENT
Start: 2025-03-26 | End: 2025-03-26

## 2025-03-26 RX ORDER — SODIUM CHLORIDE 3 G/100ML
75 INJECTION, SOLUTION INTRAVENOUS ONCE
Status: COMPLETED | OUTPATIENT
Start: 2025-03-26 | End: 2025-03-26

## 2025-03-26 RX ORDER — MANNITOL 20 G/100ML
100 INJECTION, SOLUTION INTRAVENOUS ONCE
Status: COMPLETED | OUTPATIENT
Start: 2025-03-26 | End: 2025-03-26

## 2025-03-26 RX ORDER — LORAZEPAM 2 MG/ML
2 INJECTION INTRAMUSCULAR ONCE
Status: COMPLETED | OUTPATIENT
Start: 2025-03-26 | End: 2025-03-26

## 2025-03-26 RX ORDER — NOREPINEPHRINE BITARTRATE 0.02 MG/ML
.01-.6 INJECTION, SOLUTION INTRAVENOUS CONTINUOUS
Status: DISCONTINUED | OUTPATIENT
Start: 2025-03-26 | End: 2025-03-27

## 2025-03-26 RX ADMIN — LORAZEPAM 2 MG: 2 INJECTION INTRAMUSCULAR; INTRAVENOUS at 04:36

## 2025-03-26 RX ADMIN — ENOXAPARIN SODIUM 40 MG: 40 INJECTION SUBCUTANEOUS at 11:16

## 2025-03-26 RX ADMIN — LEVETIRACETAM 1000 MG: 10 INJECTION INTRAVENOUS at 08:52

## 2025-03-26 RX ADMIN — POTASSIUM & SODIUM PHOSPHATES POWDER PACK 280-160-250 MG 1 PACKET: 280-160-250 PACK at 15:14

## 2025-03-26 RX ADMIN — AMPICILLIN SODIUM AND SULBACTAM SODIUM 3 G: 2; 1 INJECTION, POWDER, FOR SOLUTION INTRAMUSCULAR; INTRAVENOUS at 04:36

## 2025-03-26 RX ADMIN — CHLORHEXIDINE GLUCONATE 15 ML: 1.2 RINSE ORAL at 08:51

## 2025-03-26 RX ADMIN — PREDNISONE 40 MG: 10 TABLET ORAL at 08:51

## 2025-03-26 RX ADMIN — AMPICILLIN SODIUM AND SULBACTAM SODIUM 3 G: 2; 1 INJECTION, POWDER, FOR SOLUTION INTRAMUSCULAR; INTRAVENOUS at 17:40

## 2025-03-26 RX ADMIN — ACETYLCYSTEINE 2 ML: 200 SOLUTION ORAL; RESPIRATORY (INHALATION) at 01:26

## 2025-03-26 RX ADMIN — ALBUTEROL SULFATE 2.5 MG: 2.5 SOLUTION RESPIRATORY (INHALATION) at 03:40

## 2025-03-26 RX ADMIN — MANNITOL 100 G: 20 INJECTION, SOLUTION INTRAVENOUS at 08:52

## 2025-03-26 RX ADMIN — SODIUM CHLORIDE 75 ML: 3 INJECTION, SOLUTION INTRAVENOUS at 13:43

## 2025-03-26 RX ADMIN — CHLORHEXIDINE GLUCONATE 15 ML: 1.2 RINSE ORAL at 20:00

## 2025-03-26 RX ADMIN — Medication 40 MG: at 08:51

## 2025-03-26 RX ADMIN — ACETYLCYSTEINE 2 ML: 200 SOLUTION ORAL; RESPIRATORY (INHALATION) at 07:41

## 2025-03-26 RX ADMIN — BUDESONIDE 0.25 MG: 0.25 INHALANT RESPIRATORY (INHALATION) at 07:44

## 2025-03-26 RX ADMIN — POTASSIUM & SODIUM PHOSPHATES POWDER PACK 280-160-250 MG 1 PACKET: 280-160-250 PACK at 12:26

## 2025-03-26 RX ADMIN — SODIUM CHLORIDE: 3 INJECTION, SOLUTION INTRAVENOUS at 15:10

## 2025-03-26 RX ADMIN — IPRATROPIUM BROMIDE AND ALBUTEROL SULFATE 3 ML: .5; 3 SOLUTION RESPIRATORY (INHALATION) at 12:05

## 2025-03-26 RX ADMIN — ACETYLCYSTEINE 2 ML: 200 SOLUTION ORAL; RESPIRATORY (INHALATION) at 12:05

## 2025-03-26 RX ADMIN — AMPICILLIN SODIUM AND SULBACTAM SODIUM 3 G: 2; 1 INJECTION, POWDER, FOR SOLUTION INTRAMUSCULAR; INTRAVENOUS at 10:36

## 2025-03-26 RX ADMIN — IPRATROPIUM BROMIDE AND ALBUTEROL SULFATE 3 ML: .5; 3 SOLUTION RESPIRATORY (INHALATION) at 01:26

## 2025-03-26 RX ADMIN — IPRATROPIUM BROMIDE AND ALBUTEROL SULFATE 3 ML: .5; 3 SOLUTION RESPIRATORY (INHALATION) at 07:41

## 2025-03-26 RX ADMIN — NOREPINEPHRINE BITARTRATE 0.03 MCG/KG/MIN: 0.02 INJECTION, SOLUTION INTRAVENOUS at 12:25

## 2025-03-26 ASSESSMENT — ACTIVITIES OF DAILY LIVING (ADL)
ADLS_ACUITY_SCORE: 79
ADLS_ACUITY_SCORE: 79
ADLS_ACUITY_SCORE: 81
ADLS_ACUITY_SCORE: 81
ADLS_ACUITY_SCORE: 79
ADLS_ACUITY_SCORE: 83
ADLS_ACUITY_SCORE: 83
ADLS_ACUITY_SCORE: 79
ADLS_ACUITY_SCORE: 81
ADLS_ACUITY_SCORE: 83
ADLS_ACUITY_SCORE: 79
ADLS_ACUITY_SCORE: 83
ADLS_ACUITY_SCORE: 79
ADLS_ACUITY_SCORE: 81
ADLS_ACUITY_SCORE: 79
ADLS_ACUITY_SCORE: 81
ADLS_ACUITY_SCORE: 79
ADLS_ACUITY_SCORE: 79

## 2025-03-26 NOTE — PROGRESS NOTES
SPIRITUAL HEALTH SERVICES - Progress Note  [facility]   ICU  Referral Source: Rec'd triaged request for prayer.    Conferred with staff, noting changing conditions and potential for family conference.    I brought Miguel a prayer blanket and said a prayer.     I encouraged staff to reach out for family support as needed.    Plan: Spiritual Care will follow.    Rev Lore Hazel MA  Associate     [facility] routine referrals ([facility] *[phone]) ([place] *[phone])  [facility] available 24/7 for emergent requests/referrals, either by paging the on-call  or by entering an ASAP/STAT consult in Epic (this will also page the on-call ).

## 2025-03-26 NOTE — PLAN OF CARE
Neuro: Sedation free this shift.  Received ativan overnight for poss seizures.  Keppra started by NCC.  Withdraws BLE.  Starting to open eyes but inconsistent.  Gag present with strong stimulation.  Not coughing when suctioned.   Chewing/tongue biting less after keppra.  CV: SR.  Brief low grade temp.  Resp: 45%P9 from 60%P10.  Lungs sounds very diminished bilateral.  No ETT secretions.  Multiple tongue wounds from biting so blood tinged oral secretions.  GI: OG removed to help with tongue injuries and replaced with NG.  Only 70cc gastric contents removed on placement.  Verified and TF restarted at flat rate of 10/hr.  H2O flushes reduced.  No emesis today.  Small smear BM early in shift.  : Straight cath x2.  Skin: Tongue injuries as described.  Coccyx wounds WDL.  Dry skin.  Frequent turns.  Prevalon boots started for heel elevation.  WOC following.    Updated multiple family members today multiple times; Miguel has 8 siblings.  Tentative plan for care conference on Friday.  Would be helpful to determine 1-2 points of contact for communications/updates during care conference.      Problem: Pulmonary Impairment  Goal: Effective Airway Clearance  Intervention: Promote Airway Secretion Clearance  Flowsheets (Taken 3/25/2025 1911)  Airway Clearance/Ventilation Strategies:   assisted coughing techniques   secretion mobilization modalities   suctioning

## 2025-03-26 NOTE — PROGRESS NOTES
"SPIRITUAL HEALTH SERVICES  SPIRITUAL ASSESSMENT Progress Note  [facility] ICU     REFERRAL SOURCE: Phone call request from patient's brother    Spoke with patient's brother, Wilver, who called the Spiritual Health office. He was tearful as he requested prayer at the bedside saying \"he's coming to the end of his life.\" Offered to pray with Wilver, which he welcomed.    PLAN: Triaged for unit .     Koki Mcneil  Associate      [facility] available 24/7 for emergent requests/referrals, either by paging the on-call  or by entering an ASAP/STAT consult in Epic (this will also page the on-call ).     "

## 2025-03-26 NOTE — PLAN OF CARE
Upon change of shift bedside assessment with overnight nurse, writer was informed pt's pupils are fixed, no longer has cough/gag, and no longer withdraws as of 0400. Overnight nurse informed ICU, but not NC. Dual neuro assessment confirmed these findings and writer paged neurocrit fellow.

## 2025-03-26 NOTE — PLAN OF CARE
Blood pressures trending down, however meeting MAP >65. Discussed with Dr. Schwartz. He will order norepinephrine.

## 2025-03-26 NOTE — PROGRESS NOTES
[facility]    Vascular Neurology Progress Note    Interval History     In early morning patient's pupils became more and more sluggish with larger size, NPI bilaterally was less than 1, so stat head CT was obtained which showed diffuse cerebral edema with impending brainstem herniation so mannitol 100 g was infused.    Hospital Course     Chief complaint: Cardiac Arrest    59 year old male with a PMH of acute on chronic hypoxemic and hypercapnic repsiratory failure, BiPAP dependence, COPD, CKD 3, HTN, HLD, RV diastolic dysfunction, tricuspid regurgitation, DM2 who presented to the ED for out-of-hospital PEA cardiac arrest s/p ROSC. On admission, CT head showed some early changes concerning for anoxic brain injury with subtle loss of gray-white differentiation.  cEEG with suppressed activity but was negative for any seizures.  On exam off sedation he is comatose, he does have preserved brainstem reflexes but with poor motor response.  Family requesting more time after we had explained the poor long-term neuro prognosis. MRI brain revealing diffuse anoxic brain injury.  3/26: Pupils became dilated and fixed bilaterally so stat head CT was obtained which showed diffuse cerebral edema with impending herniation, and administered mannitol 100 g with improving pupillary response    Assessment and Plan     Significant anoxic brain injury secondary to cardiac arrest  Twitching movement might be myoclonus  Diffuse cerebral edema, impending uncal herniation  Partial loss of brainstem reflexes, the patient is still breathing over the vent and pupillary reflexes are still present    Recommendations   -Minimize sedation   -Continue Keppra 1000 mg twice daily  -Neurocheck every 1 hour  -Continue supportive care  -Will plan for care conference today    Patient Follow-up    -In 6 weeks with general neurology, requested    We will continue to follow.     The Stroke Staff is Dr. Blanco.    Roberto Carlos Joe  "MD  Vascular Neurology Fellow    To page me or covering stroke neurology team member, click here: [facility]  Choose \"On Call\" tab at top, then select \"NEUROLOGY/ALL SITES\" from middle drop-down box, press Enter, then look for \"stroke\" or \"telestroke\" for your site.    Physical Examination     Temp: 98.1  F (36.7  C) Temp src: Axillary BP: (!) 144/92 Pulse: 72   Resp: 15 SpO2: 93 % O2 Device: Mechanical Ventilator      Neurologic  Mental Status:   Comatose, not opening eyes to voice, opening eyes to sternal rubs, does not track, does not follow commands, breathing above vent rate.   Cranial Nerves:  no corneal reflex, no cough or gag reflex, breathing over the vent, pupils are dilated and very sluggish  Motor: min withdrawal in R upper and lower extremities.(Likely reflexive), minimal withdrawal of bilateral lower extremities  Reflexes: Unequivocal  Coordination: Unable to test  Station/Gait:  deferred      Imaging/Labs   (Bolded imaging and labs new and/or personally reviewed or re-reviewed by me today)    MRI/Head CT CT Head   IMPRESSION:  1.  Some loss of gray-white matter differentiation involving the bilateral basal ganglia, thalami, and potentially the insular cortex compared to the previous exam. Hypoxic/ischemic injury in these locations is possible. The extent of ischemic injuries   would be more accurately evaluated by MRI.  2.  No hemorrhage, extra-axial collection, or mass effect.    MRI brain  IMPRESSION:  1.  Evidence of diffuse anoxic brain injury.  2.  No intracranial hemorrhage.   Intracranial Vasculature Not done   Cervical Vasculature Not done     Echocardiogram The left ventricle is normal in size.  Left ventricular systolic function is normal.  The visual ejection fraction is 55-60%.  Normal left ventricular wall motion  The right ventricle is normal in structure, function and size.  Mild valvular aortic stenosis. The mean AoV pressure gradient is 13mmHg.  The rhythm was atrial fibrillation.  There " is no comparison study available.   EKG/Telemetry AF     LDL  No lab value available in past 30 days   A1C  No lab value available in past 90 days   Troponin No lab value available in past 48 hrs

## 2025-03-26 NOTE — PLAN OF CARE
Goal Outcome Evaluation:      Plan of Care Reviewed With: patient    Overall Patient Progress: no changeOverall Patient Progress: no change    Outcome Evaluation: Vent settings unchanged, 40% and Peep of 9. Ativan given once for asynchronicity and overbreathing vent. Copious red streaked oral secretions, biting lesions to tongue. Pt with brief period of opening L eye and withdrawing in all extremeties, pt then returned to withdrawing only in BLEs. Pupilometer NPi <1 for bilateral eyes, Intensivist notified. SBP up to 160s. Pt straight cathed x1 for urine. NG to TF 10ml/hr. PICC patent. Large watery BM x1. BGs 120s.  Pt's brother, Wilver, updated this morning. Plan for family care conference 3/28 at 1430. Discharge pending.

## 2025-03-26 NOTE — PROGRESS NOTES
[facility] ICU RESPIRATORY NOTE        Date of Admission: 3/18/2025    Date of Intubation (most recent): 3/18/2025    Reason for Mechanical Ventilation: Resp. failure    Number of Days on Mechanical Ventilation: 10    Met Criteria for Spontaneous Breathing Trial: Yes    Significant Events Today: Around ~0340 peak pressures consistently >40, increased patient-vent desynchrony. Did not improve with sx, PRN albuterol given. RT suggested sedation to improve patient-vent synchrony, RN/MD made aware.    ABG Results:   Recent Labs   Lab 03/25/25  0547 03/24/25  1900 03/24/25  0901 03/24/25  0425   O2PER 60 60 100 100         Current Vent Settings: FiO2 (%): 40 %, Resp: 15, Vent Mode: CMV/AC, Resp Rate (Set): 18 breaths/min, Tidal Volume (Set, mL): 450 mL, PEEP (cm H2O): 9 cmH2O, Resp Rate (Set): 18 breaths/min, Tidal Volume (Set, mL): 450 mL, PEEP (cm H2O): 9 cmH2O    Skin Assessment: Tongue injury, care team is aware.    Plan: Continue ventilatory support and assess for ability to wean.    Erik Pederson, RT

## 2025-03-27 LAB
ANION GAP SERPL CALCULATED.3IONS-SCNC: 9 MMOL/L (ref 7–15)
BASE EXCESS BLDV CALC-SCNC: 6.9 MMOL/L (ref -3–3)
BUN SERPL-MCNC: 24.8 MG/DL (ref 8–23)
CALCIUM SERPL-MCNC: 9.1 MG/DL (ref 8.8–10.4)
CHLORIDE SERPL-SCNC: 101 MMOL/L (ref 98–107)
CREAT SERPL-MCNC: 0.53 MG/DL (ref 0.67–1.17)
EGFRCR SERPLBLD CKD-EPI 2021: >90 ML/MIN/1.73M2
ERYTHROCYTE [DISTWIDTH] IN BLOOD BY AUTOMATED COUNT: 14.5 % (ref 10–15)
GLUCOSE SERPL-MCNC: 131 MG/DL (ref 70–99)
HCO3 BLDV-SCNC: 32 MMOL/L (ref 21–28)
HCO3 SERPL-SCNC: 29 MMOL/L (ref 22–29)
HCT VFR BLD AUTO: 29.6 % (ref 40–53)
HGB BLD-MCNC: 9.4 G/DL (ref 13.3–17.7)
MCH RBC QN AUTO: 28.8 PG (ref 26.5–33)
MCHC RBC AUTO-ENTMCNC: 31.8 G/DL (ref 31.5–36.5)
MCV RBC AUTO: 91 FL (ref 78–100)
O2/TOTAL GAS SETTING VFR VENT: 60 %
OXYHGB MFR BLDV: 83 % (ref 70–75)
PCO2 BLDV: 46 MM HG (ref 40–50)
PH BLDV: 7.45 [PH] (ref 7.32–7.43)
PLATELET # BLD AUTO: 430 10E3/UL (ref 150–450)
PO2 BLDV: 49 MM HG (ref 25–47)
POTASSIUM SERPL-SCNC: 3.9 MMOL/L (ref 3.4–5.3)
RBC # BLD AUTO: 3.26 10E6/UL (ref 4.4–5.9)
SAO2 % BLDV: 85.3 % (ref 70–75)
SODIUM SERPL-SCNC: 138 MMOL/L (ref 135–145)
SODIUM SERPL-SCNC: 139 MMOL/L (ref 135–145)
SODIUM SERPL-SCNC: 139 MMOL/L (ref 135–145)
WBC # BLD AUTO: 13.4 10E3/UL (ref 4–11)

## 2025-03-27 PROCEDURE — 99291 CRITICAL CARE FIRST HOUR: CPT | Performed by: INTERNAL MEDICINE

## 2025-03-27 PROCEDURE — 999N000040 HC STATISTIC CONSULT NO CHARGE VASC ACCESS

## 2025-03-27 PROCEDURE — 200N000001 HC R&B ICU

## 2025-03-27 PROCEDURE — 250N000009 HC RX 250: Performed by: INTERNAL MEDICINE

## 2025-03-27 PROCEDURE — 82805 BLOOD GASES W/O2 SATURATION: CPT | Performed by: INTERNAL MEDICINE

## 2025-03-27 PROCEDURE — 94003 VENT MGMT INPAT SUBQ DAY: CPT

## 2025-03-27 PROCEDURE — 999N000190 HC STATISTIC VAT ROUNDS

## 2025-03-27 PROCEDURE — 82310 ASSAY OF CALCIUM: CPT | Performed by: INTERNAL MEDICINE

## 2025-03-27 PROCEDURE — 84295 ASSAY OF SERUM SODIUM: CPT | Performed by: INTERNAL MEDICINE

## 2025-03-27 PROCEDURE — 250N000011 HC RX IP 250 OP 636: Performed by: INTERNAL MEDICINE

## 2025-03-27 PROCEDURE — 94640 AIRWAY INHALATION TREATMENT: CPT

## 2025-03-27 PROCEDURE — 250N000013 HC RX MED GY IP 250 OP 250 PS 637

## 2025-03-27 PROCEDURE — 999N000259 HC STATISTIC EXTUBATION

## 2025-03-27 PROCEDURE — 80048 BASIC METABOLIC PNL TOTAL CA: CPT | Performed by: INTERNAL MEDICINE

## 2025-03-27 PROCEDURE — 85018 HEMOGLOBIN: CPT | Performed by: INTERNAL MEDICINE

## 2025-03-27 PROCEDURE — 999N000157 HC STATISTIC RCP TIME EA 10 MIN

## 2025-03-27 RX ORDER — LIDOCAINE 40 MG/G
CREAM TOPICAL
Status: DISCONTINUED | OUTPATIENT
Start: 2025-03-27 | End: 2025-03-29 | Stop reason: HOSPADM

## 2025-03-27 RX ORDER — FENTANYL CITRATE 50 UG/ML
100-200 INJECTION, SOLUTION INTRAMUSCULAR; INTRAVENOUS EVERY 5 MIN PRN
Status: COMPLETED | OUTPATIENT
Start: 2025-03-27 | End: 2025-03-27

## 2025-03-27 RX ORDER — 3% SODIUM CHLORIDE 3 G/100ML
INJECTION, SOLUTION INTRAVENOUS CONTINUOUS
Status: DISCONTINUED | OUTPATIENT
Start: 2025-03-27 | End: 2025-03-27

## 2025-03-27 RX ORDER — FENTANYL CITRATE 50 UG/ML
50-200 INJECTION, SOLUTION INTRAMUSCULAR; INTRAVENOUS EVERY 10 MIN PRN
Status: DISCONTINUED | OUTPATIENT
Start: 2025-03-27 | End: 2025-03-29 | Stop reason: HOSPADM

## 2025-03-27 RX ORDER — GLYCOPYRROLATE 0.2 MG/ML
0.1 INJECTION, SOLUTION INTRAMUSCULAR; INTRAVENOUS EVERY 4 HOURS PRN
Status: DISCONTINUED | OUTPATIENT
Start: 2025-03-27 | End: 2025-03-29 | Stop reason: HOSPADM

## 2025-03-27 RX ORDER — FENTANYL CITRATE 50 UG/ML
50-100 INJECTION, SOLUTION INTRAMUSCULAR; INTRAVENOUS ONCE
Status: COMPLETED | OUTPATIENT
Start: 2025-03-27 | End: 2025-03-27

## 2025-03-27 RX ORDER — GLYCOPYRROLATE 0.2 MG/ML
0.2 INJECTION, SOLUTION INTRAMUSCULAR; INTRAVENOUS ONCE
Status: COMPLETED | OUTPATIENT
Start: 2025-03-27 | End: 2025-03-27

## 2025-03-27 RX ORDER — CARBOXYMETHYLCELLULOSE SODIUM 5 MG/ML
1 SOLUTION/ DROPS OPHTHALMIC EVERY 8 HOURS PRN
Status: DISCONTINUED | OUTPATIENT
Start: 2025-03-27 | End: 2025-03-29 | Stop reason: HOSPADM

## 2025-03-27 RX ORDER — IPRATROPIUM BROMIDE AND ALBUTEROL SULFATE 2.5; .5 MG/3ML; MG/3ML
3 SOLUTION RESPIRATORY (INHALATION)
Status: DISCONTINUED | OUTPATIENT
Start: 2025-03-27 | End: 2025-03-27

## 2025-03-27 RX ADMIN — CHLORHEXIDINE GLUCONATE 15 ML: 1.2 RINSE ORAL at 07:50

## 2025-03-27 RX ADMIN — FENTANYL CITRATE 100 MCG: 50 INJECTION INTRAMUSCULAR; INTRAVENOUS at 17:43

## 2025-03-27 RX ADMIN — FENTANYL CITRATE 100 MCG: 50 INJECTION INTRAMUSCULAR; INTRAVENOUS at 16:07

## 2025-03-27 RX ADMIN — FENTANYL CITRATE 200 MCG: 50 INJECTION INTRAMUSCULAR; INTRAVENOUS at 16:31

## 2025-03-27 RX ADMIN — MIDAZOLAM 4 MG: 1 INJECTION INTRAMUSCULAR; INTRAVENOUS at 16:04

## 2025-03-27 RX ADMIN — SODIUM CHLORIDE: 3 INJECTION, SOLUTION INTRAVENOUS at 09:00

## 2025-03-27 RX ADMIN — GLYCOPYRROLATE 0.2 MG: 0.2 INJECTION, SOLUTION INTRAMUSCULAR; INTRAVENOUS at 16:06

## 2025-03-27 RX ADMIN — MIDAZOLAM 4 MG: 1 INJECTION INTRAMUSCULAR; INTRAVENOUS at 17:43

## 2025-03-27 RX ADMIN — MIDAZOLAM 4 MG: 1 INJECTION INTRAMUSCULAR; INTRAVENOUS at 16:31

## 2025-03-27 RX ADMIN — IPRATROPIUM BROMIDE AND ALBUTEROL SULFATE 3 ML: .5; 3 SOLUTION RESPIRATORY (INHALATION) at 11:10

## 2025-03-27 RX ADMIN — GLYCOPYRROLATE 0.1 MG: 0.2 INJECTION, SOLUTION INTRAMUSCULAR; INTRAVENOUS at 21:39

## 2025-03-27 RX ADMIN — GLYCOPYRROLATE 0.1 MG: 0.2 INJECTION, SOLUTION INTRAMUSCULAR; INTRAVENOUS at 17:42

## 2025-03-27 ASSESSMENT — ACTIVITIES OF DAILY LIVING (ADL)
ADLS_ACUITY_SCORE: 85
ADLS_ACUITY_SCORE: 85
ADLS_ACUITY_SCORE: 81
ADLS_ACUITY_SCORE: 85
ADLS_ACUITY_SCORE: 85
ADLS_ACUITY_SCORE: 81
ADLS_ACUITY_SCORE: 85

## 2025-03-27 NOTE — PROGRESS NOTES
Comprehensive Daily ICU Note        Miguel Rivera MRN# [id number]   Age: 59 year old YOB: 1965     Date of Admission: 3/18/2025    Primary care provider: No Ref-Primary, Physician     CODE STATUS: Full      Problem List:   Active Problems:    Anoxic brain damage (H)    Cardiopulmonary arrest (H)    Closed fracture of multiple ribs, unspecified laterality, initial encounter    Aspiration pneumonia (H)    Acute on chronic respiratory failure with hypoxia and hypercapnia (H)    COPD exacerbation (H)        Subjective/ Last 24 hours:   Events:   Vent setting stable.  At 4am, lose of pupil reflex. CTH in morning showed worsening cerebral edema with early signs of transtentorial herniation.   Mannitol given, later 3% NaCl bolused and infusion started as discussed with NCC.   Overnight, pt's brother Pio (but not Martínez) was updated. Plan for family meeting today       Physical Examination:       Temp:  [98.1  F (36.7  C)-99.6  F (37.6  C)] 99.6  F (37.6  C)  Pulse:  [] 77  Resp:  [0-26] 18  BP: ()/(54-92) 135/71  FiO2 (%):  [40 %-60 %] 50 %  SpO2:  [90 %-97 %] 96 %  General: Intubated, eyes closed   HEENT: ET and OG with intermittent thick ET tube secretions  Lungs: Synchronous with ventilator, decreased breath sounds at bilateral bases and in the left lung fields, no wheezing or rhonchi auscultated  CVS: Regular rate and rhythm, no murmur  Abdomen: Protruding abdomen, non-tender  Extremities/musculoskeletal/skin: More swollen right arm, very mild swelling on left arm. No lower extremity edema.  Neurology/Psychiatry: Does not open eyes spontaneous, pupils no longer reactive, does not follow commands, and no spontaneous movements.  HR,respiratory drive still maintained  Exam of Line sites: PICC line with clean dressing, aforementioned swelling           Medications:     Current Facility-Administered Medications   Medication Dose Route Frequency Provider Last Rate Last Admin    chlorhexidine  (PERIDEX) 0.12 % solution 15 mL  15 mL Mouth/Throat Q12H Annette Taylor PA-C   15 mL at 03/26/25 0851    pantoprazole (PROTONIX) 2 mg/mL suspension 40 mg  40 mg Per Feeding Tube QAM  Annette Taylor PA-C   40 mg at 03/26/25 0851    Or    pantoprazole (PROTONIX) IV push injection 40 mg  40 mg Intravenous QAM  Annette Taylor PA-C   40 mg at 03/25/25 1002    sodium chloride (PF) 0.9% PF flush 10-40 mL  10-40 mL Intracatheter Q7 Days Adriel Meléndez MD   30 mL at 03/26/25 0926    sodium chloride (PF) 0.9% PF flush 10-40 mL  10-40 mL Intracatheter Daily Adriel Meléndez MD   30 mL at 03/26/25 0926        Current Facility-Administered Medications   Medication Dose Route Frequency Provider Last Rate Last Admin    norepinephrine (LEVOPHED) 4 mg in  mL infusion PREMIX  0.01-0.6 mcg/kg/min Intravenous Continuous Hilton Schwartz MD 7.2 mL/hr at 03/26/25 1712 0.02 mcg/kg/min at 03/26/25 1712    sodium chloride 0.9 % infusion   Intravenous Continuous Hilton Schwartz MD 10 mL/hr at 03/26/25 0024 Rate Verify at 03/26/25 0024       Current Facility-Administered Medications   Medication Dose Route Frequency Provider Last Rate Last Admin    acetaminophen (TYLENOL) tablet 650 mg  650 mg Oral or Feeding Tube Q6H PRN Hilton Schwartz MD   650 mg at 03/24/25 0042    Or    acetaminophen (TYLENOL) oral liquid 650 mg  650 mg Per NG tube Q6H PRN Hilton Schwartz MD        albuterol (PROVENTIL) neb solution 2.5 mg  2.5 mg Nebulization Q4H PRN Annette Taylor PA-C   2.5 mg at 03/26/25 0340    HYDROmorphone (DILAUDID) injection 0.2 mg  0.2 mg Intravenous Q2H PRN Annette Taylor PA-C   0.2 mg at 03/24/25 0053    [Held by provider] midazolam (VERSED) injection 4 mg  4 mg Intravenous Q1H PRN Mir Jacboson MD   4 mg at 03/18/25 1924    naloxone (NARCAN) injection 0.2 mg  0.2 mg Intravenous Q2 Min PRN John Ahuja [facility]        Or    naloxone (NARCAN) injection  0.4 mg  0.4 mg Intravenous Q2 Min PRN John Ahuja S, RPH        Or    naloxone (NARCAN) injection 0.2 mg  0.2 mg Intramuscular Q2 Min PRN Ahuja, John S, RPH        Or    naloxone (NARCAN) injection 0.4 mg  0.4 mg Intramuscular Q2 Min PRN Ahuja, John S, RPH        ondansetron (ZOFRAN ODT) ODT tab 4 mg  4 mg Oral Q6H PRN Annette Taylor PA-C        Or    ondansetron (ZOFRAN) injection 4 mg  4 mg Intravenous Q6H PRN Annette Taylor PA-C        polyethylene glycol (MIRALAX) Packet 17 g  17 g Oral Daily PRN Annette Taylor PA-C        sodium chloride (PF) 0.9% PF flush 10-20 mL  10-20 mL Intracatheter q1 min prn Adriel Meléndez MD             Imaging and Other Data:        CT Head w/o Contrast  Narrative: EXAM: CT HEAD W/O CONTRAST  LOCATION: [facility]  DATE: 3/26/2025    INDICATION: Patient with anoxic brain injury with new fixed dilated pupil.    COMPARISON: MRI of the brain 3/21/2025. CT of the head 3/18/2025.    TECHNIQUE: Routine CT head without intravenous contrast. Multiplanar reformats. Dose reduction techniques were used.    FINDINGS:  INTRACRANIAL CONTENTS: The exam is somewhat limited by motion artifact. Interval progression of severe diffuse cerebral swelling with diffuse sulcal effacement and moderate to marked narrowing of the bilateral lateral ventricles and complete effacement   of the third ventricle. Significant narrowing of the suprasellar cistern and findings raising concern for early downward transtentorial herniation with effacement of the ambient and interpeduncular cisterns and at least some apparent mass effect of the   bilateral cerebral peduncles.    New on this exam compared to prior CT there is diffuse subtle hyperattenuation throughout the cerebral sulci, the interhemispheric fissure region, the suprasellar cistern, bilateral sylvian cisterns, and the ambient cisterns. This is favored to represent   pseudosubarachnoid  hemorrhage/artifact rather than true diffuse subarachnoid hemorrhage, but is not entirely specific. Mildly low-lying cerebellar tonsils.    VISUALIZED ORBITS/SINUSES/MASTOIDS: No intraorbital abnormality. No paranasal sinus mucosal disease. No middle ear or mastoid effusion.    BONES/SOFT TISSUES: No acute abnormality. The patient is intubated and a nasoenteric tube is partially visualized extending through the right nasal cavity into the pharynx. These devices are incompletely evaluated. There are secretions in the pharynx.   Mucosal thickening and secretions in the right maxillary and left greater than right sphenoid sinuses and mild to moderate left posterior ethmoid sinus mucosal thickening. Unchanged chronic fracture deformity of the left lamina papyracea. Moderate to   marked bilateral mastoid effusions with partial opacification of the middle ear cavities, new/increased compared to the most recent CT.  Impression: IMPRESSION:  1.  Interval progression of severe diffuse cerebral swelling with sulcal effacement and moderate to marked narrowing of the bilateral lateral ventricles and complete effacement of the third ventricle. Significant narrowing of the suprasellar cistern and   findings raising concern for early downward transtentorial herniation.  2.  Diffuse subtle hyperattenuation throughout the cerebral sulci, the interhemispheric fissure region, the suprasellar cistern, bilateral sylvian cisterns, and the ambient cisterns. This is favored to represent pseudosubarachnoid hemorrhage/artifact   rather than true diffuse subarachnoid hemorrhage.  3.  New/increased moderate to marked bilateral mastoid effusions with partial opacification of the middle ear cavities.    Findings were discussed with the critical care physician caring for the patient by myself by phone at 8:50 AM Central time 03/26/2025.             Assessment and Plan:     Summary:    59-year-old man with COPD, hypertension, kidney disease, and  chronic hypercapnic and hypoxemic respiratory failure requiring home oxygen who was found unresponsive by his family with disconnected oxygen.  Unknown downtime (ast known well 1608 and found around 1625).  EMS found patient in asystole.  Had return of spontaneous circulation approximately 30 minutes after EMS arrival.  In the ED, patient was found to be severely hypercapnic and was intubated.. Admitted to ICU.  EEG showed severe encephalopathy.  Purulent secretions and hypoxic with airflow obstruction.  3/21, MRI consistent with severe anoxic encephalopathy.  EEG with very little spontaneous activity and patient unresponsive to painful stimuli.  Also being treat for strep species/aspiration pneumonia and AECOPD.     Miguel Rivera is a 59 year old male admitted on 3/18/2025 for cardiac arrest.    I have personally reviewed the daily labs, imaging studies, cultures and discussed the case with referring physician and consulting physicians. My assessment and plan as follows:    Neurology and Psychiatry:  Assessment:   Current Facility-Administered Medications   Medication Dose Route Frequency Provider Last Rate Last Admin    norepinephrine (LEVOPHED) 4 mg in  mL infusion PREMIX  0.01-0.6 mcg/kg/min Intravenous Continuous Hilton Schwartz MD 7.2 mL/hr at 03/26/25 1712 0.02 mcg/kg/min at 03/26/25 1712    sodium chloride 0.9 % infusion   Intravenous Continuous Hilton Schwartz MD 10 mL/hr at 03/26/25 0024 Rate Verify at 03/26/25 0024     Severe anoxic encephalopathy  Prolonged cardiac arrest CPR/ACLS before ROSC (30+ minutes) by EMS with unclear time down (25 or less minutes) before EMS arrival. CO2 immeasurably high upon arrival leaving. Post arrest 72 hr MRI brain imaging shows diffuse axonal brain injury. Poor neuro exam. cEEG previously showed minimal brain activity and no seizures. Overall, poor prognosis with no expectation for meaningful neurologic recovery and family discussions around code status and  goals of care are ongoing.    Plan   [facility] 3/23 discussion- plan was revisit after 1 week of further assessment and waiting as all siblings not in agreement, including code status. Family members intermittently asking for second opinion and for medical records- unclear if true desire for the 2nd opinion- if it is helpful for the family for closure, will pursue though unlikely to change outcome. Unclear if transfer of patient will be necessary for 2nd opinion or if phone consultation will be only avenue for pursuit.   Discussed with risk management to confirm   [facility] 3/26 Discussion ~1600- All siblings either present in person or over the phone (including Ran), NCC and pt's bedside RNs.   With new CTH findings and neurological decline, despite temporizing measures with mannitol and 3% NaCl, would expect continued diffuse cerebral injury and continued cerebral edema and herniation. No other options really to treat. All family members received time to ask questions. Pt's brother Ran, most hesitant of his siblings to change the code status or withdrawal, spent sometime asking questions. At the end he needed sometime to think about it; later was informed by RN that after a discussion, he also agreed that comfort care measures were appropriate. Family had already lost several siblings and with Miguel being one of he younger siblings, and all of them remaining close, this has been a tough decision for the family as a whole.   Stopped unnecessary labs and medications. Comfort care orders held until all family members can arrive.   3. Myoclonus- chewing motion-on Keppra  4. Sarted on mannitol this morning after CTH, 3% NaCl bolus and infusion started by NCC as well    Cardiovascular and Hemodynamics:  Assessment:  Temp:  [98.1  F (36.7  C)-99.6  F (37.6  C)] 99.6  F (37.6  C)  Pulse:  [] 77  Resp:  [0-26] 18  BP: ()/(54-92) 135/71  FiO2 (%):  [40 %-60 %] 50 %  SpO2:  [90 %-97 %] 96 %       Intake/Output Summary  (Last 24 hours) at 3/26/2025 1955  Last data filed at 3/26/2025 1800  Gross per 24 hour   Intake 2042.42 ml   Output 2625 ml   Net -582.58 ml         Echo result w/o MOPS: Interpretation Summary The left ventricle is normal in size.Left ventricular systolic function is normal.The visual ejection fraction is 55-60%.Normal left ventricular wall motionThe right ventricle is normal in structure, function and size.Mild valvular aortic stenosis. The mean AoV pressure gradient is 13mmHg.The rhythm was atrial fibrillation.There is no comparison study available.    Vasoplegic/cardiogenic shock- had to restart norepi ggt again this afternoon due to hypotension   Asystolic arrest.  From history respiratory as a primary source seems most likely.  EKG not consistent with STEMI.  Troponin minimally elevated  Medical problems include chronic hypoxic/hypercapneic respiratory failure and NIV usage, hypertension, dyslipidemia and atrial fibrillation and previous diagnosis of diastolic heart failure/PH and tricuspid regurgitation (the latter two issues appear improved on recent TTE) .  Blood pressures now trending higher without need for vasopressor medications.  Ectopy that has resolved/ no recurrence of Afib since arrival  5.   Superficial cephalic vein thrombosis at PICC site.      Plan  Monitor hemodynamics closely.  Initiate antihypertensive if necessary.  Elevate PICC site/right arm for the swelling and heat over read area. Monitor for worsening signs/symptoms and remove if necessary, although access required at this time.  Hold off on A/C for Afib unless recurrence and after GOC discussion    Pulmonary/ID:  Assessment:  FiO2 (%): 50 %, Resp: 18, Vent Mode: PS, Resp Rate (Set): 18 breaths/min, Tidal Volume (Set, mL): 310 mL, PEEP (cm H2O): 9 cmH2O, Pressure Support (cm H2O): 10 cmH2O, Resp Rate (Set): 18 breaths/min, Tidal Volume (Set, mL): 310 mL, PEEP (cm H2O): 9 cmH2O   Arterial Blood Gas  Recent Labs   Lab 03/26/25  1341  03/26/25  0438 03/25/25  0547 03/24/25  1900   O2PER 50 40 60 60     pH Venous   Date Value Ref Range Status   03/26/2025 7.40 7.32 - 7.43 Final   03/26/2025 7.31 (L) 7.32 - 7.43 Final   03/25/2025 7.40 7.32 - 7.43 Final     pCO2 Venous   Date Value Ref Range Status   03/26/2025 52 (H) 40 - 50 mm Hg Final   03/26/2025 68 (H) 40 - 50 mm Hg Final   03/25/2025 55 (H) 40 - 50 mm Hg Final       Acute on chronic hypoxic and hypercapnic respiratory failure with home oxygen administration failure.  Aspiration pneumonia: Low-grade fever, leukocytosis, purulent secretions, Streptococcus pseudopneumoniae growing in sputum.  On ceftriaxone since 3/22 and was on Zosyn 3/20-3/22.  Extensive mucus plugging, s/p emergent bronchoscopy on 3/24 AM and repeat later 3/24 morning    Plan:  Antibiotics as below  Continue bronchodilators (Duonebs and Pulmicort) and systemic steroids and mucomyst and chest pt to help mucus plugging. Unfortunate lack of cough/strong cough effort leading to pooling of secretions  Complete 7d of IV solumederol then start prednisone taper  Continue current ventilator settings with plan to wean PEEP/FiO2 slowly- weaned PEEP-9 with FiO2-45%  VAP precautions: HOB 30, chlorhexidine  5.   Check daily VBGs to ensure appropriate vent settings - increased MV settings   6.   Repeat bronchoscopy if increasing mucus plugging seen on intermittent CXR or increased O2 needs  7.  Bite blocks attempted to prevent chewing on tongue by pt's myoclonus    GI and Nutrition:  Assessment:  AST   Date Value Ref Range Status   03/25/2025 34 0 - 45 U/L Final   03/24/2025 47 (H) 0 - 45 U/L Final   03/23/2025 67 (H) 0 - 45 U/L Final     ALT   Date Value Ref Range Status   03/25/2025 13 0 - 70 U/L Final   03/24/2025 16 0 - 70 U/L Final   03/23/2025 17 0 - 70 U/L Final     Bilirubin Total   Date Value Ref Range Status   03/25/2025 0.2 <=1.2 mg/dL Final   03/24/2025 0.2 <=1.2 mg/dL Final   03/23/2025 0.2 <=1.2 mg/dL Final     Protein Total    Date Value Ref Range Status   03/25/2025 6.5 6.4 - 8.3 g/dL Final   03/24/2025 6.3 (L) 6.4 - 8.3 g/dL Final   03/23/2025 6.3 (L) 6.4 - 8.3 g/dL Final     Albumin   Date Value Ref Range Status   03/26/2025 2.6 (L) 3.5 - 5.2 g/dL Final   03/25/2025 3.2 (L) 3.5 - 5.2 g/dL Final   03/24/2025 3.1 (L) 3.5 - 5.2 g/dL Final     Alkaline Phosphatase   Date Value Ref Range Status   03/25/2025 66 40 - 150 U/L Final   03/24/2025 63 40 - 150 U/L Final   03/23/2025 65 40 - 150 U/L Final     GI PPx  Nutritional Plan: Tube feeds   Transaminitis- resolved     Plan:   Monitor   Restart tube feeds but at low rate today and slowly increase tomorrow  Stress ulcer: PPI    Renal, Fluid and Electrolytes:  Assessment:  BMP  Recent Labs   Lab 03/26/25  1726 03/26/25  1341 03/26/25  1040 03/26/25  0438 03/25/25  0506 03/24/25  0558 03/24/25  0425 03/23/25  0434    131* 129*  --  132* 133*  --  134*   POTASSIUM  --   --  4.5 4.3 4.3 4.8  --  4.5   CHLORIDE  --   --  94*  --  94* 96*  --  97*   CO2  --   --  28  --  34* 30*  --  29   ANIONGAP  --   --  7  --  4* 7  --  8   BUN  --   --  22.6  --  27.2* 22.5  --  20.0   CR  --   --  0.45*  --  0.54* 0.56*  --  0.64*   GFRESTIMATED  --   --  >90  --  >90 >90  --  >90   LOLA  --   --  7.8*  --  8.6* 8.4*  --  8.3*   MAG  --   --   --  2.2 1.9  --  2.1 1.7   PHOS  --   --  2.4* 3.2 2.6  --  3.7 3.1     Lactic Acid:  Lactic Acid   Date Value Ref Range Status   03/20/2025 0.8 0.7 - 2.0 mmol/L Final   03/19/2025 0.7 0.7 - 2.0 mmol/L Final   03/19/2025 2.4 (H) 0.7 - 2.0 mmol/L Final       Intake/Output Summary (Last 24 hours) at 3/25/2025 1452  Last data filed at 3/25/2025 1200  Gross per 24 hour   Intake 629 ml   Output 1510 ml   Net -881 ml         Chronic respiratory acidosis with metabolic compensation, improved PvCO2 since admission.  Urine output adequate  Mild Hyponatremia- worsening     Plan:   Replace electrolytes if abnormal  Stop FWF   Monitor I/O, urine output, kidney function and  "electrolytes and treat as needed. Avoid nephrotoxic agents.  Place sanchez catheter back in as being started on mannitol and 3% NaCl      Infectious Disease:  Assessment:  7-Day Micro Results       Collected Updated Procedure Result Status      03/24/2025 1640 03/26/2025 0918 Respiratory Aerobic Bacterial Culture [[id number]]   Bronchial Alveolar Lavage from Lung, Lower Lobe, Left    Final result Component Value   Culture No Growth   Gram Stain Result >25 PMNs/low power field    No organisms seen                     Aspiration pneumonia: fevers and leukocytosis has resolved, continues to have purulent secretions though improved following bronchoscopy, Streptococcus growing in sputum previously       Since mucus plugging event, has leukocytosis.   2. Fever- 3/24 with fever curve down trending again following change in antibiotics and bronchoscopy for airway clearance    Plan:  Narrowed antibiotics from Zosyn to ceftriaxone on 3/22 but changed to Unasyn 3/24 following extensive mucus plugging  In total, day 6 of antibiotics today. Will completed 8d of antibiotics since repeat bronchial wash culture was negative.     Hematology and Oncology:  Assessment:  Recent Labs   Lab 03/26/25  0925 03/26/25  0438 03/25/25  0506 03/24/25  1419   WBC 16.1* 17.1* 17.4* 15.1*   RBC 3.00* 3.43* 3.42* 3.49*   HGB 8.4* 9.6* 9.6* 9.7*   HCT 27.8* 31.5* 31.2* 32.4*   MCV 93 92 91 93   MCH 28.0 28.0 28.1 27.8   MCHC 30.2* 30.5* 30.8* 29.9*   RDW 14.5 14.6 14.2 14.4    378 271 258     No lab results found in last 7 days.  No results found for: \"AAUFH\"    1.  Moderate anemia.  No signs of bleeding or hemodynamic instability.  2.  Leukocytosis- likely 2/2 pulmonary/ID process      Plan:  Anticoagulation:  Lovenox and SCDs    Endocrinology and Rheumatology:  Assessment:  Stress hyperglycemia  Recent Labs   Lab 03/26/25  1717 03/26/25  1512 03/26/25  1118 03/26/25  1040 03/26/25  0925 03/26/25  0419   * 139* 128* 118* 135* 120*    " "  Plan:  Insulin orders in place.  Has not required  Steroids    Skin and Musculoskeletal:  Assessment:  Standard skin cares    Family update: Updated by RN/MD as able    Clinically Significant Risk Factors     # Obesity: Estimated body mass index is 38.67 kg/m  as calculated from the following:    Height as of this encounter: 1.575 m (5' 2\").    Weight as of this encounter: 95.9 kg (211 lb 6.7 oz).  # Severe Malnutrition: based on nutrition assessment and treatment provided per dietitian's recommendations.        [facility] ICU Rounding checklist    Assessment of central venous catheter access Central access present and needed   Assessment of urinary catheter use Intermittent straight cath   DVT prophylaxis Subcutaneous heparin product (heparin or LMWH)     [facility]  CRITICAL CARE STAFF NOTE    I am managing these acute and ongoing critical issues resulting in critical condition that impairs one or more vital organ systems, incur a high probability of imminent or life-threatening deterioration in the patient's condition and providing frequent personal assessment and manipulation of medications and life support equipment.     1. Out of hospital cardiac arrest  2. Acute on chronic hypoxic and hypercapnic respiratory failure   3. Diffuse anoxic devastating brain injury   4. Aspiration pneumonia  5. Extensive mucus plugging.     ICU Interventions: ventilator management and interpretation of lab values, cardiac output, cxr, pulse oximetry, blood gases, and/or information/data stored in computers    The patient was seen and examined with the resident/fellow/VERONICA and/or medical student.  We have discussed the patient in detail and I agree with the findings, assessment, and plan as documented when this note was cosigned on this day. The plan was formulated in conjunction with pharmacy, ICU nurses, and respiratory therapist. I have evaluated all laboratory values and imaging studies for the past 24 hours. I " have reviewed all the consults that have been ordered and are active for this patient.      Critical Care Time: 100  min.  I spent this time (excluding procedures) personally providing and directing critical care services at the bedside and on the critical care unit.      Hilton Schwartz MD   of Medicine, Pulmonary/CC  March 26, 2025

## 2025-03-27 NOTE — PROGRESS NOTES
Patient compassionately extubated at 1622. Family arrived to bedside after extubation.    Papa Burton, RRT  03/27/25

## 2025-03-27 NOTE — PROVIDER NOTIFICATION
VERONICA notified of ETT 3cm further out, no respiratory changes will continue to monitor. Neuro checks and 3% orders clarified and reviewed.

## 2025-03-27 NOTE — PROGRESS NOTES
[facility] note --    We have spoken with family after confirming their plans for withdrawal of the ventilator later today. They have respectfully declined moving forward with organ donation workup process.   Please call [phone] with cardiac time of death.    Stephanie Henry RN CCRN  [facility] Donation coordinator

## 2025-03-27 NOTE — PLAN OF CARE
3/26 [id number]    Orientation: KAYDEN, pt intubated and unresponsive   Aggression Stop Light: Green  Activity: T/R q2hrs  Diet/BS Checks: NPO with TF at 10mL/hr (goal rate); q4hr BG checks   Tele: NSR  IV Access/Drains: L PIV SL; R PICC triple lumen; sanchez catheter; NG tube  Pain Management: No outward signs of pain  Abnormal VS/Results: Phos replaced this shift; Slightly hypotensive this shift, levo started; mechanically vented;  -FiO2 50  -Rate 18  -Tidal Volume 310  -PEEP 9  Bowel/Bladder: Incontinent of bowel, no BM this shift; Sanchez placed this shift retention and strict I&O  Skin/Wounds: Scattered bruising/scabbing; dry, flaky skin throughout; +3 BLE edema   Consults: Neurocrit  D/C Disposition: TBD  Other Info:     -Neuro change reported in shift handoff from overnight RN, pupils were fixed. Neuro paged, STAT head CT obtained, see results  -Care conference called for family, ultimately decided to change to DNR and extubate tomorrow for full comfort cares when family present    Problem: Adult Inpatient Plan of Care  Goal: Absence of Hospital-Acquired Illness or Injury  Intervention: Prevent and Manage VTE (Venous Thromboembolism) Risk  Recent Flowsheet Documentation  Taken 3/26/2025 1600 by Emmie Webb RN  VTE Prevention/Management: SCDs on (sequential compression devices)  Taken 3/26/2025 1200 by Emmie Webb RN  VTE Prevention/Management: SCDs on (sequential compression devices)     Problem: Mechanical Ventilation Invasive  Goal: Optimal Device Function  Intervention: Optimize Device Care and Function  Recent Flowsheet Documentation  Taken 3/26/2025 1800 by Emmie Webb RN  Oral Care:   lip/mouth moisturizer applied   swabbed with antiseptic solution  Taken 3/26/2025 1615 by Emmie Webb RN  Oral Care:   lip/mouth moisturizer applied   swabbed with antiseptic solution  Taken 3/26/2025 1600 by Emmie Webb RN  Airway Safety Measures: all equipment/monitors on and audible  Oral Care:    swabbed with antiseptic solution   lip/mouth moisturizer applied  Taken 3/26/2025 1200 by Emmie Webb RN  Airway Safety Measures: all equipment/monitors on and audible  Oral Care:   swabbed with antiseptic solution   lip/mouth moisturizer applied  Taken 3/26/2025 0800 by Emmie Webb RN  Oral Care:   swabbed with antiseptic solution   lip/mouth moisturizer applied     Problem: Skin Injury Risk Increased  Goal: Skin Health and Integrity  Intervention: Optimize Skin Protection  Recent Flowsheet Documentation  Taken 3/26/2025 1800 by Emmie Webb RN  Head of Bed (HOB) Positioning: HOB at 30 degrees  Taken 3/26/2025 1615 by Emmie Webb RN  Head of Bed (HOB) Positioning: HOB at 30 degrees  Taken 3/26/2025 1600 by Emmie Webb RN  Head of Bed (HOB) Positioning: HOB at 30 degrees  Taken 3/26/2025 1400 by Emmie Webb RN  Head of Bed (HOB) Positioning: HOB at 30 degrees  Taken 3/26/2025 1200 by Emmie Webb RN  Head of Bed (HOB) Positioning: HOB at 30 degrees  Taken 3/26/2025 0800 by Emmie Webb RN  Head of Bed (HOB) Positioning: HOB at 30 degrees   Goal Outcome Evaluation:

## 2025-03-27 NOTE — PROGRESS NOTES
Comprehensive Daily ICU Note        Miguel Rivera MRN# [id number]   Age: 59 year old YOB: 1965     Date of Admission: 3/18/2025    Primary care provider: No Ref-Primary, Physician     CODE STATUS: Full      Problem List:   Active Problems:    Anoxic brain damage (H)    Cardiopulmonary arrest (H)    Closed fracture of multiple ribs, unspecified laterality, initial encounter    Aspiration pneumonia (H)    Acute on chronic respiratory failure with hypoxia and hypercapnia (H)    COPD exacerbation (H)        Subjective/ Last 24 hours:   Events:   Vent setting stable.  Delay in CC start as family still flying into town. Family plans on coming this afternoon.   Restarted on 3% NaCl for the purpose of temporizing measure until family fully arrives        Physical Examination:       Temp:  [97.3  F (36.3  C)-100.8  F (38.2  C)] 100.8  F (38.2  C)  Pulse:  [58-85] 64  Resp:  [0-27] 26  BP: (109-173)/(64-86) 138/75  FiO2 (%):  [50 %-60 %] 60 %  SpO2:  [89 %-98 %] 98 %  General: Intubated, eyes closed   HEENT: ET and OG with intermittent thick ET tube secretions  Lungs: Synchronous with ventilator, decreased breath sounds at bilateral bases and in the left lung fields, no wheezing or rhonchi auscultated  CVS: Regular rate and rhythm, no murmur  Abdomen: Protruding abdomen, non-tender  Extremities/musculoskeletal/skin: More swollen right arm, very mild swelling on left arm. No lower extremity edema.  Neurology/Psychiatry: Does not open eyes spontaneous, pupils no longer reactive, does not follow commands, and no spontaneous movements.  HR,respiratory drive still maintained  Exam of Line sites: PICC line with clean dressing, aforementioned swelling           Medications:     Current Facility-Administered Medications   Medication Dose Route Frequency Provider Last Rate Last Admin    chlorhexidine (PERIDEX) 0.12 % solution 15 mL  15 mL Mouth/Throat Q12H Annette Taylor PA-C   15 mL at 03/27/25 0750     ipratropium - albuterol 0.5 mg/2.5 mg/3 mL (DUONEB) neb solution 3 mL  3 mL Nebulization Q4H Hilton Schwartz MD   3 mL at 03/27/25 1110    pantoprazole (PROTONIX) 2 mg/mL suspension 40 mg  40 mg Per Feeding Tube QAM  Annette Taylor PA-C   40 mg at 03/26/25 0851    Or    pantoprazole (PROTONIX) IV push injection 40 mg  40 mg Intravenous QAM  Annette Taylor PA-C   40 mg at 03/25/25 1002    sodium chloride (PF) 0.9% PF flush 10-40 mL  10-40 mL Intracatheter Q7 Days Adriel Meléndez MD   30 mL at 03/26/25 0926    sodium chloride (PF) 0.9% PF flush 10-40 mL  10-40 mL Intracatheter Daily Adriel Meléndez MD   10 mL at 03/27/25 0750        Current Facility-Administered Medications   Medication Dose Route Frequency Provider Last Rate Last Admin    sodium chloride 0.9 % infusion   Intravenous Continuous Hilton Schwartz MD   Stopped at 03/27/25 0000    sodium chloride 3% infusion   Intravenous Continuous Hilton Schwartz MD 30 mL/hr at 03/27/25 0900 New Bag at 03/27/25 0900       Current Facility-Administered Medications   Medication Dose Route Frequency Provider Last Rate Last Admin    acetaminophen (TYLENOL) tablet 650 mg  650 mg Oral or Feeding Tube Q6H PRN Hilton Schwartz MD   650 mg at 03/24/25 0042    Or    acetaminophen (TYLENOL) oral liquid 650 mg  650 mg Per NG tube Q6H PRN Hilton Schwartz MD        albuterol (PROVENTIL) neb solution 2.5 mg  2.5 mg Nebulization Q4H PRN Annette Taylor PA-C   2.5 mg at 03/26/25 0340    HYDROmorphone (DILAUDID) injection 0.2 mg  0.2 mg Intravenous Q2H PRN Annette Taylor PA-C   0.2 mg at 03/24/25 0053    [Held by provider] midazolam (VERSED) injection 4 mg  4 mg Intravenous Q1H PRN Mir Jacobson MD   4 mg at 03/18/25 1924    naloxone (NARCAN) injection 0.2 mg  0.2 mg Intravenous Q2 Min PRN John Ahuja RPH        Or    naloxone (NARCAN) injection 0.4 mg  0.4 mg Intravenous Q2 Min PRN John Ahuja RPH         Or    naloxone (NARCAN) injection 0.2 mg  0.2 mg Intramuscular Q2 Min PRN John Ahuja RPH        Or    naloxone (NARCAN) injection 0.4 mg  0.4 mg Intramuscular Q2 Min PRN John Ahuja RPH        ondansetron (ZOFRAN ODT) ODT tab 4 mg  4 mg Oral Q6H PRN Annette Taylor PA-C        Or    ondansetron (ZOFRAN) injection 4 mg  4 mg Intravenous Q6H PRN Annette Taylor PA-C        polyethylene glycol (MIRALAX) Packet 17 g  17 g Oral Daily PRN Annette Taylor PA-C        sodium chloride (PF) 0.9% PF flush 10-20 mL  10-20 mL Intracatheter q1 min prn Adriel Meléndez MD             Imaging and Other Data:        CT Head w/o Contrast  Narrative: EXAM: CT HEAD W/O CONTRAST  LOCATION: [facility]  DATE: 3/26/2025    INDICATION: Patient with anoxic brain injury with new fixed dilated pupil.    COMPARISON: MRI of the brain 3/21/2025. CT of the head 3/18/2025.    TECHNIQUE: Routine CT head without intravenous contrast. Multiplanar reformats. Dose reduction techniques were used.    FINDINGS:  INTRACRANIAL CONTENTS: The exam is somewhat limited by motion artifact. Interval progression of severe diffuse cerebral swelling with diffuse sulcal effacement and moderate to marked narrowing of the bilateral lateral ventricles and complete effacement   of the third ventricle. Significant narrowing of the suprasellar cistern and findings raising concern for early downward transtentorial herniation with effacement of the ambient and interpeduncular cisterns and at least some apparent mass effect of the   bilateral cerebral peduncles.    New on this exam compared to prior CT there is diffuse subtle hyperattenuation throughout the cerebral sulci, the interhemispheric fissure region, the suprasellar cistern, bilateral sylvian cisterns, and the ambient cisterns. This is favored to represent   pseudosubarachnoid hemorrhage/artifact rather than true diffuse subarachnoid hemorrhage,  but is not entirely specific. Mildly low-lying cerebellar tonsils.    VISUALIZED ORBITS/SINUSES/MASTOIDS: No intraorbital abnormality. No paranasal sinus mucosal disease. No middle ear or mastoid effusion.    BONES/SOFT TISSUES: No acute abnormality. The patient is intubated and a nasoenteric tube is partially visualized extending through the right nasal cavity into the pharynx. These devices are incompletely evaluated. There are secretions in the pharynx.   Mucosal thickening and secretions in the right maxillary and left greater than right sphenoid sinuses and mild to moderate left posterior ethmoid sinus mucosal thickening. Unchanged chronic fracture deformity of the left lamina papyracea. Moderate to   marked bilateral mastoid effusions with partial opacification of the middle ear cavities, new/increased compared to the most recent CT.  Impression: IMPRESSION:  1.  Interval progression of severe diffuse cerebral swelling with sulcal effacement and moderate to marked narrowing of the bilateral lateral ventricles and complete effacement of the third ventricle. Significant narrowing of the suprasellar cistern and   findings raising concern for early downward transtentorial herniation.  2.  Diffuse subtle hyperattenuation throughout the cerebral sulci, the interhemispheric fissure region, the suprasellar cistern, bilateral sylvian cisterns, and the ambient cisterns. This is favored to represent pseudosubarachnoid hemorrhage/artifact   rather than true diffuse subarachnoid hemorrhage.  3.  New/increased moderate to marked bilateral mastoid effusions with partial opacification of the middle ear cavities.    Findings were discussed with the critical care physician caring for the patient by myself by phone at 8:50 AM Central time 03/26/2025.             Assessment and Plan:     Summary:    59-year-old man with COPD, hypertension, kidney disease, and chronic hypercapnic and hypoxemic respiratory failure requiring home  oxygen who was found unresponsive by his family with disconnected oxygen.  Unknown downtime (ast known well 1608 and found around 1625).  EMS found patient in asystole.  Had return of spontaneous circulation approximately 30 minutes after EMS arrival.  In the ED, patient was found to be severely hypercapnic and was intubated.. Admitted to ICU.  EEG showed severe encephalopathy.  Purulent secretions and hypoxic with airflow obstruction.  3/21, MRI consistent with severe anoxic encephalopathy.  EEG with very little spontaneous activity and patient unresponsive to painful stimuli.  Also being treat for strep species/aspiration pneumonia and AECOPD.     Miguel Rivera is a 59 year old male admitted on 3/18/2025 for cardiac arrest.    I have personally reviewed the daily labs, imaging studies, cultures and discussed the case with referring physician and consulting physicians. My assessment and plan as follows:    Neurology and Psychiatry:  Assessment:   Current Facility-Administered Medications   Medication Dose Route Frequency Provider Last Rate Last Admin    sodium chloride 0.9 % infusion   Intravenous Continuous Hilton Schwartz MD   Stopped at 03/27/25 0000    sodium chloride 3% infusion   Intravenous Continuous Hilton Schwartz MD 30 mL/hr at 03/27/25 0900 New Bag at 03/27/25 0900     Severe anoxic encephalopathy  Prolonged cardiac arrest CPR/ACLS before ROSC (30+ minutes) by EMS with unclear time down (25 or less minutes) before EMS arrival. CO2 immeasurably high upon arrival leaving. Post arrest 72 hr MRI brain imaging shows diffuse axonal brain injury. Poor neuro exam. cEEG previously showed minimal brain activity and no seizures. Overall, poor prognosis with no expectation for meaningful neurologic recovery and family discussions around code status and goals of care are ongoing.    Plan   [facility] 3/23 discussion- plan was revisit after 1 week of further assessment and waiting as all siblings not in agreement,  including code status. Family members intermittently asking for second opinion and for medical records- unclear if true desire for the 2nd opinion- if it is helpful for the family for closure, will pursue though unlikely to change outcome. Unclear if transfer of patient will be necessary for 2nd opinion or if phone consultation will be only avenue for pursuit.   Discussed with risk management to confirm   [facility] 3/26 Discussion ~1600- All siblings either present in person or over the phone (including Ran), NCC and pt's bedside RNs.   With new CTH findings and neurological decline, despite temporizing measures with mannitol and 3% NaCl, would expect continued diffuse cerebral injury and continued cerebral edema and herniation. No other options really to treat. All family members received time to ask questions. Pt's brother Ran, most hesitant of his siblings to change the code status or withdrawal, spent sometime asking questions. At the end he needed sometime to think about it; later was informed by RN that after a discussion, he also agreed that comfort care measures were appropriate. Family had already lost several siblings and with Miguel being one of he younger siblings, and all of them remaining close, this has been a tough decision for the family as a whole.   Stopped unnecessary labs and medications. Comfort care orders held until all family members can arrive.   3. Myoclonus- resolved   4. 3%NaCl infusion was stopped after family decided for CC but was restarted as a temporizing measure after delay in comfort care timing    Cardiovascular and Hemodynamics:  Assessment:  Temp:  [97.3  F (36.3  C)-100.8  F (38.2  C)] 100.8  F (38.2  C)  Pulse:  [58-85] 64  Resp:  [0-27] 26  BP: (109-173)/(64-86) 138/75  FiO2 (%):  [50 %-60 %] 60 %  SpO2:  [89 %-98 %] 98 %         Intake/Output Summary (Last 24 hours) at 3/27/2025 1443  Last data filed at 3/27/2025 1200  Gross per 24 hour   Intake 655.78 ml   Output 1625 ml    Net -969.22 ml           Echo result w/o MOPS: Interpretation Summary The left ventricle is normal in size.Left ventricular systolic function is normal.The visual ejection fraction is 55-60%.Normal left ventricular wall motionThe right ventricle is normal in structure, function and size.Mild valvular aortic stenosis. The mean AoV pressure gradient is 13mmHg.The rhythm was atrial fibrillation.There is no comparison study available.    Vasoplegic/cardiogenic shock- had to restart norepi ggt again this afternoon due to hypotension   Asystolic arrest.  From history respiratory as a primary source seems most likely.  EKG not consistent with STEMI.  Troponin minimally elevated  Medical problems include chronic hypoxic/hypercapneic respiratory failure and NIV usage, hypertension, dyslipidemia and atrial fibrillation and previous diagnosis of diastolic heart failure/PH and tricuspid regurgitation (the latter two issues appear improved on recent TTE) .  Blood pressures now trending higher without need for vasopressor medications.  Ectopy that has resolved/ no recurrence of Afib since arrival  5.   Superficial cephalic vein thrombosis at PICC site.      Plan  Monitor hemodynamics closely.  Initiate antihypertensive if necessary.  Elevate PICC site/right arm for the swelling and heat over read area. Monitor for worsening signs/symptoms and remove if necessary, although access required at this time.  Hold off on A/C for Afib episode  No longer on norepi; breifly was on it yesterday    Pulmonary/ID:  Assessment:  FiO2 (%): 60 %, Resp: 26, Vent Mode: CMV/AC, Resp Rate (Set): 26 breaths/min, Tidal Volume (Set, mL): 440 mL, PEEP (cm H2O): 9 cmH2O, Pressure Support (cm H2O): 10 cmH2O, Resp Rate (Set): 26 breaths/min, Tidal Volume (Set, mL): 440 mL, PEEP (cm H2O): 9 cmH2O   Arterial Blood Gas  Recent Labs   Lab 03/27/25  0919 03/26/25  1341 03/26/25  0438 03/25/25  0547   O2PER 60 50 40 60     pH Venous   Date Value Ref Range  Status   03/27/2025 7.45 (H) 7.32 - 7.43 Final   03/26/2025 7.40 7.32 - 7.43 Final   03/26/2025 7.31 (L) 7.32 - 7.43 Final     pCO2 Venous   Date Value Ref Range Status   03/27/2025 46 40 - 50 mm Hg Final   03/26/2025 52 (H) 40 - 50 mm Hg Final   03/26/2025 68 (H) 40 - 50 mm Hg Final       Acute on chronic hypoxic and hypercapnic respiratory failure with home oxygen administration failure.  Aspiration pneumonia: Low-grade fever, leukocytosis, purulent secretions, Streptococcus pseudopneumoniae growing in sputum.  On ceftriaxone since 3/22 and was on Zosyn 3/20-3/22.  Extensive mucus plugging, s/p emergent bronchoscopy on 3/24 AM and repeat later 3/24 morning    Plan:  Antibiotics as below  Continue bronchodilators (Duonebs and Pulmicort) and systemic steroids and mucomyst and chest pt to help mucus plugging-  Unfortunate lack of cough/strong cough effort leading to pooling of secretions- stopped due to GOC.  Complete IV Solumederol course   Continue current ventilator settings with RR increased yesterday to normalize PvCO2  VAP precautions: HOB 30, chlorhexidine      GI and Nutrition:  Assessment:  AST   Date Value Ref Range Status   03/25/2025 34 0 - 45 U/L Final   03/24/2025 47 (H) 0 - 45 U/L Final   03/23/2025 67 (H) 0 - 45 U/L Final     ALT   Date Value Ref Range Status   03/25/2025 13 0 - 70 U/L Final   03/24/2025 16 0 - 70 U/L Final   03/23/2025 17 0 - 70 U/L Final     Bilirubin Total   Date Value Ref Range Status   03/25/2025 0.2 <=1.2 mg/dL Final   03/24/2025 0.2 <=1.2 mg/dL Final   03/23/2025 0.2 <=1.2 mg/dL Final     Protein Total   Date Value Ref Range Status   03/25/2025 6.5 6.4 - 8.3 g/dL Final   03/24/2025 6.3 (L) 6.4 - 8.3 g/dL Final   03/23/2025 6.3 (L) 6.4 - 8.3 g/dL Final     Albumin   Date Value Ref Range Status   03/26/2025 2.6 (L) 3.5 - 5.2 g/dL Final   03/25/2025 3.2 (L) 3.5 - 5.2 g/dL Final   03/24/2025 3.1 (L) 3.5 - 5.2 g/dL Final     Alkaline Phosphatase   Date Value Ref Range Status    03/25/2025 66 40 - 150 U/L Final   03/24/2025 63 40 - 150 U/L Final   03/23/2025 65 40 - 150 U/L Final     GI Ppx- stop once extubated today  Nutritional Plan: Tube feeds   Transaminitis- resolved     Plan:   Monitor   TF- low rate due to recent emesis   Stress ulcer: PPI- stopped as comfort measures shortly    Renal, Fluid and Electrolytes:  Assessment:  BMP  Recent Labs   Lab 03/27/25  1136 03/27/25  0919 03/26/25  1726 03/26/25  1341 03/26/25  1040 03/26/25  0438 03/25/25  0506 03/24/25  0558 03/24/25  0425 03/23/25  0434    139 136 131* 129*  --  132* 133*  --  134*   POTASSIUM  --  3.9  --   --  4.5 4.3 4.3 4.8  --  4.5   CHLORIDE  --  101  --   --  94*  --  94* 96*  --  97*   CO2  --  29  --   --  28  --  34* 30*  --  29   ANIONGAP  --  9  --   --  7  --  4* 7  --  8   BUN  --  24.8*  --   --  22.6  --  27.2* 22.5  --  20.0   CR  --  0.53*  --   --  0.45*  --  0.54* 0.56*  --  0.64*   GFRESTIMATED  --  >90  --   --  >90  --  >90 >90  --  >90   LOLA  --  9.1  --   --  7.8*  --  8.6* 8.4*  --  8.3*   MAG  --   --   --   --   --  2.2 1.9  --  2.1 1.7   PHOS  --   --   --   --  2.4* 3.2 2.6  --  3.7 3.1     Lactic Acid:  Lactic Acid   Date Value Ref Range Status   03/20/2025 0.8 0.7 - 2.0 mmol/L Final   03/19/2025 0.7 0.7 - 2.0 mmol/L Final   03/19/2025 2.4 (H) 0.7 - 2.0 mmol/L Final       Intake/Output Summary (Last 24 hours) at 3/27/2025 1442  Last data filed at 3/27/2025 1200  Gross per 24 hour   Intake 655.78 ml   Output 1625 ml   Net -969.22 ml           Chronic respiratory acidosis with metabolic compensation, improved PvCO2 since admission.  Urine output adequate     Plan:   Replace electrolytes if abnormal  Stop FWF except with meds t help with hyponatremia   Monitor I/O, urine output, kidney function and electrolytes and treat as needed. Avoid nephrotoxic agents.  Continue sanchez cath as end of life and for 3% NaCl until CC measures started       Infectious Disease:  Assessment:  7-Day Micro Results  "      Collected Updated Procedure Result Status      03/24/2025 1640 03/26/2025 0918 Respiratory Aerobic Bacterial Culture [[id number]]   Bronchial Alveolar Lavage from Lung, Lower Lobe, Left    Final result Component Value   Culture No Growth   Gram Stain Result >25 PMNs/low power field    No organisms seen                     Aspiration pneumonia: fevers and leukocytosis has resolved, continues to have purulent secretions though improved following bronchoscopy, Streptococcus growing in sputum previously       Since mucus plugging event, has leukocytosis.   2. Fever- 3/24 with fever curve down trending again following change in antibiotics and bronchoscopy for airway clearance    Plan:  Narrowed antibiotics from Zosyn to ceftriaxone on 3/22 but changed to Unasyn 3/24 following extensive mucus plugging  In total, day 6 of antibiotics today. Completed 8d of antibiotics then stopped.     Hematology and Oncology:  Assessment:  Recent Labs   Lab 03/27/25  1034 03/26/25  0925 03/26/25  0438 03/25/25  0506   WBC 13.4* 16.1* 17.1* 17.4*   RBC 3.26* 3.00* 3.43* 3.42*   HGB 9.4* 8.4* 9.6* 9.6*   HCT 29.6* 27.8* 31.5* 31.2*   MCV 91 93 92 91   MCH 28.8 28.0 28.0 28.1   MCHC 31.8 30.2* 30.5* 30.8*   RDW 14.5 14.5 14.6 14.2    314 378 271     No lab results found in last 7 days.  No results found for: \"AAUFH\"    1.  Moderate anemia.  No signs of bleeding or hemodynamic instability.  2.  Leukocytosis- likely 2/2 pulmonary/ID process      Plan:  Anticoagulation:  Lovenox and SCDs    Endocrinology and Rheumatology:  Assessment:  Stress hyperglycemia  Recent Labs   Lab 03/27/25  0919 03/26/25  1717 03/26/25  1512 03/26/25  1118 03/26/25  1040 03/26/25  0925   * 148* 139* 128* 118* 135*      Plan:  Insulin orders in place.  Has not required  Steroids- stopped     Skin and Musculoskeletal:  Assessment:  Standard skin cares    Family update: Updated yesterday; family coming this afternoon    Clinically Significant Risk " "Factors     # Obesity: Estimated body mass index is 38.67 kg/m  as calculated from the following:    Height as of this encounter: 1.575 m (5' 2\").    Weight as of this encounter: 95.9 kg (211 lb 6.7 oz).  # Severe Malnutrition: based on nutrition assessment and treatment provided per dietitian's recommendations.        [facility] ICU Rounding checklist    Assessment of central venous catheter access Central access present and needed   Assessment of urinary catheter use  Blount catheter    DVT prophylaxis Subcutaneous heparin product (heparin or LMWH)     [facility]  CRITICAL CARE STAFF NOTE    I am managing these acute and ongoing critical issues resulting in critical condition that impairs one or more vital organ systems, incur a high probability of imminent or life-threatening deterioration in the patient's condition and providing frequent personal assessment and manipulation of medications and life support equipment.     1. Out of hospital cardiac arrest  2. Acute on chronic hypoxic and hypercapnic respiratory failure   3. Diffuse anoxic devastating brain injury   4. Aspiration pneumonia  5. Extensive mucus plugging.     ICU Interventions: ventilator management and interpretation of lab values, cardiac output, cxr, pulse oximetry, blood gases, and/or information/data stored in computers    The patient was seen and examined with the resident/fellow/VERONICA and/or medical student.  We have discussed the patient in detail and I agree with the findings, assessment, and plan as documented when this note was cosigned on this day. The plan was formulated in conjunction with pharmacy, ICU nurses, and respiratory therapist. I have evaluated all laboratory values and imaging studies for the past 24 hours. I have reviewed all the consults that have been ordered and are active for this patient.      Critical Care Time: 40  min.  I spent this time (excluding procedures) personally providing and directing critical care " services at the bedside and on the critical care unit.      Hilton Schwartz MD   of Medicine, Pulmonary/CC  March 27, 2025

## 2025-03-27 NOTE — PROGRESS NOTES
Pt compassionately extubated at 1622 after family arrived at bedside. RN helped take finger prints and foot prints of pt at family request. TF stopped and nasal cannula applied per family request. Pt appears comfortable at this time. Family questions and concerns addressed.

## 2025-03-27 NOTE — PLAN OF CARE
Problem: Adult Inpatient Plan of Care  Goal: Plan of Care Review  Description: The Plan of Care Review/Shift note should be completed every shift.  The Outcome Evaluation is a brief statement about your assessment that the patient is improving, declining, or no change.  This information will be displayed automatically on your shift  note.  3/27/2025 0227 by Lita Fritz RN  Outcome: Unable to Meet  Flowsheets (Taken 3/27/2025 0227)  Plan of Care Reviewed With: patient  Overall Patient Progress: declining  3/27/2025 0226 by Lita Fritz RN  Outcome: Not Progressing  Goal: Absence of Hospital-Acquired Illness or Injury  Intervention: Identify and Manage Fall Risk  Recent Flowsheet Documentation  Taken 3/27/2025 0000 by Lita Fritz RN  Safety Promotion/Fall Prevention:   activity supervised   assistive device/personal items within reach   clutter free environment maintained   increased rounding and observation   increase visualization of patient   lighting adjusted   nonskid shoes/slippers when out of bed   patient and family education   room door open   room near nurse's station   room organization consistent   safety round/check completed  Taken 3/26/2025 2000 by Lita Fritz RN  Safety Promotion/Fall Prevention:   activity supervised   assistive device/personal items within reach   clutter free environment maintained   increased rounding and observation   increase visualization of patient   lighting adjusted   nonskid shoes/slippers when out of bed   patient and family education   room door open   room near nurse's station   room organization consistent   safety round/check completed  Intervention: Prevent Skin Injury  Recent Flowsheet Documentation  Taken 3/27/2025 0200 by Lita Fritz, RN  Body Position:   turned   right   heels elevated   legs elevated  Taken 3/27/2025 0000 by Lita Fritz RN  Body Position:   turned   left   heels elevated   legs elevated  Skin Protection:   adhesive use limited    silicone foam dressing in place  Taken 3/26/2025 2200 by Lita Fritz RN  Body Position:   turned   right   heels elevated   legs elevated  Taken 3/26/2025 2000 by Lita Fritz RN  Body Position:   turned   left   heels elevated   legs elevated  Skin Protection:   adhesive use limited   silicone foam dressing in place  Intervention: Prevent and Manage VTE (Venous Thromboembolism) Risk  Recent Flowsheet Documentation  Taken 3/27/2025 0000 by Lita Fritz RN  VTE Prevention/Management: SCDs on (sequential compression devices)  Taken 3/26/2025 2000 by Lita Fritz RN  VTE Prevention/Management: SCDs on (sequential compression devices)  Goal: Optimal Comfort and Wellbeing  Intervention: Provide Person-Centered Care  Recent Flowsheet Documentation  Taken 3/27/2025 0000 by Lita Fritz RN  Trust Relationship/Rapport:   care explained   emotional support provided  Taken 3/26/2025 2000 by Lita Fritz RN  Trust Relationship/Rapport:   care explained   emotional support provided     Problem: Mechanical Ventilation Invasive  Goal: Effective Communication  Intervention: Ensure Effective Communication  Recent Flowsheet Documentation  Taken 3/27/2025 0000 by Lita Fritz RN  Trust Relationship/Rapport:   care explained   emotional support provided  Taken 3/26/2025 2000 by Lita Fritz RN  Trust Relationship/Rapport:   care explained   emotional support provided  Goal: Optimal Device Function  Intervention: Optimize Device Care and Function  Recent Flowsheet Documentation  Taken 3/27/2025 0200 by Lita Fritz RN  Oral Care: swabbed with antiseptic solution  Taken 3/27/2025 0000 by Lita Fritz RN  Airway Safety Measures: all equipment/monitors on and audible  Oral Care: swabbed with antiseptic solution  Taken 3/26/2025 2300 by Lita Fritz RN  Oral Care: swabbed with antiseptic solution  Taken 3/26/2025 2200 by Lita Fritz RN  Oral Care: swabbed with antiseptic solution  Taken 3/26/2025 2000 by Lam  PATY [place]  Airway Safety Measures: all equipment/monitors on and audible  Oral Care: swabbed with antiseptic solution  Goal: Mechanical Ventilation Liberation  Intervention: Promote Extubation and Mechanical Ventilation Liberation  Recent Flowsheet Documentation  Taken 3/27/2025 0000 by Lita Fritz RN  Sleep/Rest Enhancement:   awakenings minimized   noise level reduced   relaxation techniques promoted   room darkened  Medication Review/Management: medications reviewed  Environmental Support: calm environment promoted  Taken 3/26/2025 2000 by Lita Fritz RN  Sleep/Rest Enhancement:   awakenings minimized   noise level reduced   relaxation techniques promoted   room darkened  Medication Review/Management: medications reviewed  Environmental Support: calm environment promoted  Goal: Optimal Nutrition Delivery  Intervention: Optimize Nutrition Delivery  Recent Flowsheet Documentation  Taken 3/27/2025 0000 by Lita Fritz RN  Nutrition Support Management: tube feeding rate decreased  Taken 3/26/2025 2000 by Lita Fritz RN  Nutrition Support Management: tube feeding rate decreased  Goal: Absence of Device-Related Skin and Tissue Injury  Intervention: Maintain Skin and Tissue Health  Recent Flowsheet Documentation  Taken 3/27/2025 0000 by Lita Fritz RN  Device Skin Pressure Protection:   tubing/devices free from skin contact   pressure points protected   positioning supports utilized   adhesive use limited  Taken 3/26/2025 2000 by Lita Fritz RN  Device Skin Pressure Protection:   tubing/devices free from skin contact   pressure points protected   positioning supports utilized   adhesive use limited  Goal: Absence of Ventilator-Induced Lung Injury  Intervention: Prevent Ventilator-Associated Pneumonia  Recent Flowsheet Documentation  Taken 3/27/2025 0200 by Lita Fritz, RN  Oral Care: swabbed with antiseptic solution  Head of Bed (HOB) Positioning: HOB at 30 degrees  Taken 3/27/2025 0000 by Lam  PATY [place]  VAP Prevention Bundle:   HOB elevation maintained   oral care regularly provided   readiness to extubate assessed   stress ulcer prophylaxis provided   vent circuit breaks minimized   VTE prophylaxis provided  Oral Care: swabbed with antiseptic solution  Head of Bed (HOB) Positioning: HOB at 30 degrees  VAP Prevention Measures: completed  Taken 3/26/2025 2300 by Lita Fritz RN  Oral Care: swabbed with antiseptic solution  Taken 3/26/2025 2200 by Lita Fritz RN  Oral Care: swabbed with antiseptic solution  Head of Bed (HOB) Positioning: HOB at 30 degrees  Taken 3/26/2025 2000 by Lita Fritz RN  VAP Prevention Bundle:   HOB elevation maintained   oral care regularly provided   readiness to extubate assessed   stress ulcer prophylaxis provided   vent circuit breaks minimized   VTE prophylaxis provided  Oral Care: swabbed with antiseptic solution  Head of Bed (HOB) Positioning: HOB at 30 degrees  VAP Prevention Measures: completed     Problem: Comorbidity Management  Goal: Maintenance of Heart Failure Symptom Control  Intervention: Maintain Heart Failure Management  Recent Flowsheet Documentation  Taken 3/27/2025 0000 by Lita Fritz RN  Medication Review/Management: medications reviewed  Taken 3/26/2025 2000 by Lita Fritz RN  Medication Review/Management: medications reviewed     Problem: Risk for Delirium  Goal: Optimal Coping  Intervention: Optimize Psychosocial Adjustment to Delirium  Recent Flowsheet Documentation  Taken 3/27/2025 0000 by Lita Fritz RN  Supportive Measures: relaxation techniques promoted  Taken 3/26/2025 2000 by Lita Fritz RN  Supportive Measures: relaxation techniques promoted  Goal: Improved Behavioral Control  Intervention: Prevent and Manage Agitation  Recent Flowsheet Documentation  Taken 3/27/2025 0000 by Lita Fritz RN  Environment Familiarity/Consistency: daily routine followed  Taken 3/26/2025 2000 by Lita Fritz RN  Environment  Familiarity/Consistency: daily routine followed  Intervention: Minimize Safety Risk  Recent Flowsheet Documentation  Taken 3/27/2025 0000 by Lita Fritz RN  Enhanced Safety Measures:   pain management   review medications for side effects with activity  Trust Relationship/Rapport:   care explained   emotional support provided  Taken 3/26/2025 2000 by Lita Fritz RN  Enhanced Safety Measures:   pain management   review medications for side effects with activity  Trust Relationship/Rapport:   care explained   emotional support provided  Goal: Improved Attention and Thought Clarity  Intervention: Maximize Cognitive Function  Recent Flowsheet Documentation  Taken 3/27/2025 0000 by Lita Fritz RN  Sensory Stimulation Regulation:   auditory stimulation minimized   care clustered   lighting decreased   quiet environment promoted   tactile stimulation minimized   visual stimulation minimized  Reorientation Measures:   calendar in view   clock in view   familiar social contact encouraged  Taken 3/26/2025 2000 by Lita Fritz RN  Sensory Stimulation Regulation:   auditory stimulation minimized   care clustered   lighting decreased   quiet environment promoted   tactile stimulation minimized   visual stimulation minimized  Reorientation Measures:   calendar in view   clock in view   familiar social contact encouraged  Goal: Improved Sleep  Intervention: Promote Sleep  Recent Flowsheet Documentation  Taken 3/27/2025 0000 by Lita Fritz RN  Sleep/Rest Enhancement:   awakenings minimized   noise level reduced   relaxation techniques promoted   room darkened  Taken 3/26/2025 2000 by Lita Fritz RN  Sleep/Rest Enhancement:   awakenings minimized   noise level reduced   relaxation techniques promoted   room darkened     Problem: Skin Injury Risk Increased  Goal: Skin Health and Integrity  Intervention: Plan: Nurse Driven Intervention: Positioning  Recent Flowsheet Documentation  Taken 3/27/2025 0000 by Lam  PATY [place]  Plan: Positioning Interventions:   REPOSITION Left/Right (No supine) q2h   Use wedge positioners   HOB 30 degrees or less   OFF-LOAD HEELS with boots  Taken 3/26/2025 2000 by Lita Fritz RN  Plan: Positioning Interventions:   REPOSITION Left/Right (No supine) q2h   Use wedge positioners   HOB 30 degrees or less   OFF-LOAD HEELS with boots  Intervention: Plan: Nurse Driven Intervention: Moisture Management  Recent Flowsheet Documentation  Taken 3/27/2025 0000 by Lita Fritz RN  Moisture Interventions:   No brief in bed   Incontinence pad   Urinary collection device   Perineal cleanser  Taken 3/26/2025 2300 by Lita Fritz RN  Bathing/Skin Care:   bath, complete   dressed/undressed   electrode patches/site rotation   hair washed   linen changed   moisturizer applied  Taken 3/26/2025 2000 by Lita Fritz RN  Moisture Interventions:   No brief in bed   Incontinence pad   Urinary collection device   Perineal cleanser  Intervention: Plan: Nurse Driven Intervention: Friction and Shear  Recent Flowsheet Documentation  Taken 3/27/2025 0000 by Lita Fritz RN  Friction/Shear Interventions:   HOB 30 degrees or less   Silicone foam sacral dressing  Taken 3/26/2025 2000 by Lita Fritz RN  Friction/Shear Interventions:   HOB 30 degrees or less   Silicone foam sacral dressing  Intervention: Optimize Skin Protection  Recent Flowsheet Documentation  Taken 3/27/2025 0200 by Lita Fritz RN  Head of Bed ([facility]) Positioning: HOB at 30 degrees  Taken 3/27/2025 0000 by Lita Fritz RN  Pressure Reduction Techniques:   heels elevated off bed   positioned off wounds   weight shift assistance provided  Pressure Reduction Devices:   heel offloading device utilized   positioning supports utilized   foam padding utilized  Skin Protection:   adhesive use limited   silicone foam dressing in place  Head of Bed (HOB) Positioning: HOB at 30 degrees  Taken 3/26/2025 2200 by Lita Fritz RN  Head of Bed ([facility])  Positioning: HOB at 30 degrees  Taken 3/26/2025 2000 by Lita Fritz RN  Pressure Reduction Techniques:   heels elevated off bed   positioned off wounds   weight shift assistance provided  Pressure Reduction Devices:   heel offloading device utilized   positioning supports utilized   foam padding utilized  Skin Protection:   adhesive use limited   silicone foam dressing in place  Head of Bed (HOB) Positioning: HOB at 30 degrees     Problem: Gas Exchange Impaired  Goal: Optimal Gas Exchange  Intervention: Optimize Oxygenation and Ventilation  Recent Flowsheet Documentation  Taken 3/27/2025 0200 by Lita Fritz RN  Head of Bed (HOB) Positioning: HOB at 30 degrees  Taken 3/27/2025 0000 by Lita Fritz RN  Head of Bed (HOB) Positioning: HOB at 30 degrees  Taken 3/26/2025 2200 by Lita Fritz RN  Head of Bed (HOB) Positioning: HOB at 30 degrees  Taken 3/26/2025 2000 by Lita Fritz RN  Head of Bed ([facility]) Positioning: HOB at 30 degrees   Goal Outcome Evaluation:      Plan of Care Reviewed With: patient    Overall Patient Progress: decliningOverall Patient Progress: declining       Patient declining neurologically. Pupils unequal and sluggish. Posturing with stimuli, corneal reflexes intact. Provider updated on neurological changes. HR 60-80. BP increasing. Levophed stopped approximately 2200. 50% PEEP 9, copious oral secretions. Tube feed at 10, minimal residuals. Brother Wilver updated multiple times throughout night.

## 2025-03-27 NOTE — PROGRESS NOTES
[facility] ICU RESPIRATORY NOTE        Date of Admission: 3/18/2025    Date of Intubation (most recent): 3/18/2025    Reason for Mechanical Ventilation: Cardiopulmonary Arrest    Number of Days on Mechanical Ventilation: Day 9    Met Criteria for Spontaneous Breathing Trial: Yes    Significant Events Today: CT completed after neuro changes this morning. Patient currently on SBT 9/10 by MD since 1522.    ABG Results:   Recent Labs   Lab 03/26/25  1341 03/26/25  0438 03/25/25  0547 03/24/25  1900   O2PER 50 40 60 60         Current Vent Settings: FiO2 (%): 50 %, Resp: 18, Vent Mode: PS, Resp Rate (Set): 18 breaths/min, Tidal Volume (Set, mL): 310 mL, PEEP (cm H2O): 9 cmH2O, Pressure Support (cm H2O): 10 cmH2O, Resp Rate (Set): 18 breaths/min, Tidal Volume (Set, mL): 310 mL, PEEP (cm H2O): 9 cmH2O      Plan: Continue to monitor patient overnight and assess for weaning daily.     Marino Mei, RT on 3/26/2025 at 7:05 PM

## 2025-03-27 NOTE — INTERIM SUMMARY
Yesterday, we held a care conference with several of the patient's family members present in person, while others joined by phone. During the discussion, we reviewed the patient's hospital course, prognosis, and shared updates regarding a new neurological finding--diffuse cerebral edema with signs of impending brainstem herniation, indicating a very poor prognosis.    The family expressed understanding of the situation. A decision was made to transition the patient to comfort care, with plans to compassionately extubate after remaining family members arrive at the bedside.    No further recommendations from the neurology team. We will sign off.    Thank you for allowing us to be part of the patient's care.    Roberto Carlos Joe MD  Stroke fellow

## 2025-03-27 NOTE — PROVIDER NOTIFICATION
Provider notified of neurological change, posturing with stimuli. Will monitor and notify provider with changes in pupils, blood pressure or heart rate.

## 2025-03-27 NOTE — PLAN OF CARE
At end of shift, pt's sister Esha called and stated they would like to move extubation/comfort care from tonight to tomorrow. Pt has a sister who would like to be here as we transition to comfort care.

## 2025-03-28 VITALS
OXYGEN SATURATION: 93 % | DIASTOLIC BLOOD PRESSURE: 91 MMHG | RESPIRATION RATE: 14 BRPM | SYSTOLIC BLOOD PRESSURE: 152 MMHG | WEIGHT: 211.42 LBS | HEART RATE: 78 BPM | TEMPERATURE: 100.8 F | BODY MASS INDEX: 38.91 KG/M2 | HEIGHT: 62 IN

## 2025-03-28 PROCEDURE — 99233 SBSQ HOSP IP/OBS HIGH 50: CPT | Performed by: NURSE PRACTITIONER

## 2025-03-28 PROCEDURE — 99233 SBSQ HOSP IP/OBS HIGH 50: CPT

## 2025-03-28 PROCEDURE — 99418 PROLNG IP/OBS E/M EA 15 MIN: CPT | Performed by: NURSE PRACTITIONER

## 2025-03-28 PROCEDURE — 250N000011 HC RX IP 250 OP 636: Performed by: INTERNAL MEDICINE

## 2025-03-28 PROCEDURE — 250N000013 HC RX MED GY IP 250 OP 250 PS 637: Performed by: INTERNAL MEDICINE

## 2025-03-28 PROCEDURE — 250N000011 HC RX IP 250 OP 636: Mod: JZ

## 2025-03-28 PROCEDURE — 120N000001 HC R&B MED SURG/OB

## 2025-03-28 RX ADMIN — GLYCOPYRROLATE 0.1 MG: 0.2 INJECTION, SOLUTION INTRAMUSCULAR; INTRAVENOUS at 18:36

## 2025-03-28 RX ADMIN — HYDROMORPHONE HYDROCHLORIDE 0.2 MG: 0.2 INJECTION, SOLUTION INTRAMUSCULAR; INTRAVENOUS; SUBCUTANEOUS at 14:09

## 2025-03-28 RX ADMIN — CARBOXYMETHYLCELLULOSE SODIUM 1 DROP: 5 SOLUTION/ DROPS OPHTHALMIC at 07:55

## 2025-03-28 RX ADMIN — GLYCOPYRROLATE 0.1 MG: 0.2 INJECTION, SOLUTION INTRAMUSCULAR; INTRAVENOUS at 04:36

## 2025-03-28 RX ADMIN — GLYCOPYRROLATE 0.1 MG: 0.2 INJECTION, SOLUTION INTRAMUSCULAR; INTRAVENOUS at 14:09

## 2025-03-28 RX ADMIN — HYDROMORPHONE HYDROCHLORIDE 0.2 MG: 0.2 INJECTION, SOLUTION INTRAMUSCULAR; INTRAVENOUS; SUBCUTANEOUS at 21:09

## 2025-03-28 RX ADMIN — GLYCOPYRROLATE 0.1 MG: 0.2 INJECTION, SOLUTION INTRAMUSCULAR; INTRAVENOUS at 10:03

## 2025-03-28 RX ADMIN — FENTANYL CITRATE 50 MCG: 50 INJECTION INTRAMUSCULAR; INTRAVENOUS at 07:54

## 2025-03-28 ASSESSMENT — ACTIVITIES OF DAILY LIVING (ADL)
ADLS_ACUITY_SCORE: 85

## 2025-03-28 NOTE — PROGRESS NOTES
Writer spoke with both brothers, Martíenz and Wilver and discussed that patient will transfer out of the ICU when another bed becomes available.  Both understood that transferring out of ICU allows for more comfortable, quiet environment for the patient and family while receiving comfort care.  Await next bed assignment.

## 2025-03-28 NOTE — PROGRESS NOTES
[facility]    Consult Note - [facility]  _________________________________________________________________    [facility] 24/7 Contact Number: [phone]    - Providers: Please contact [facility] with changes in orders or clinical plan of care   - Nursing: Please contact [facility] with significant changes in patient condition    Hospice will notify the care team (including the hospitalist) to confirm date of inpatient hospice (GIP) admission.    New Epic encounter will not be created until hospice completes admission.   ______________________________________________________________________      Summary of Informational Hospice Visit (includes assessment, medications and any new orders):     This writer spoke with patient's brother, Martínez, over the phone this afternoon.  Martínez would like to meet with our Hospice Team at 3pm this afternoon to go over hospice care.    Hospice Chaplain, Carol, plans to meet family at 3pm for information Hospice Visit..    This writer completed assessment with patient this afternoon and will be available for starting hospice care this afternoon if patient's family would like Hospice Care.      Thank you for taking such great care of this patient and family!      Sarai Carter, RN

## 2025-03-28 NOTE — PROGRESS NOTES
3/27 [id number]     Patient on comfort care. Tong given x1. Patient unresponsive, appears comfortable. Patient's family updated via phone and in person. Educated on signs of impending death.

## 2025-03-28 NOTE — PROGRESS NOTES
RN to RN report completed prior to patient transfer. All patient belongings and the patient chart transferred with patient. Patient family updated about transfer.

## 2025-03-28 NOTE — PROGRESS NOTES
Brief House NP Note    Upon arrival patient is near death appearing with agonal respirations and upper airway secretion gurgling.  Patient's family is at bedside including his Sister Esha and brother Martínez.  They inquired about patient being able to be transferred to [facility].  House NP explained that patient is not safe for transfer at this time due to death appearing imminent.  House NP discussed that there was a care conference earlier today in which it was decided that patient was going to be comfort cares.  And at this time family was in agreement with that.  Esha and Martínez explained that their brother Ran is very upset and does not agree with this decision.  Offered to speak with Ran over the phone however when Esha called she did not put him on speaker phone.  She relayed the same information that we talked about, however ruthy GONZALEZ was not able to speak with Ran herself.  At this time family is in agreement that we will continue comfort cares.  House NP informed family that patient will likely pass away within the next 24 hours.    Tai Reynaga NP  [facility]  Securely message with the Vocera Web Console (learn more here)  Text page via [employer] Paging/Directory    Medical Decision Making       30 MINUTES SPENT BY ME on the date of service doing chart review, and having advanced care planning discussions.

## 2025-03-28 NOTE — PROGRESS NOTES
[facility]    Medicine Progress Note - Hospitalist Service    Date of Admission:  3/18/2025    Assessment & Plan    Miguel Rivera is a 59 year old male who presents on 3/18/2025 after an out of hospital cardiac arrest. He has a past medical history of COPD, hypertension, kidney disease, and chronic hypercapnic and hypoxemic respiratory failure requiring home oxygen. On  3/18 he was found down by family with is oxygen disconnected. Actual down time is unknown, but last known well was at 1608 on day of admission and he was found around 1625. When EMS arrived, they found him to be in asystole. ROSC was ultimately achieved 30 minutes after EMS arrival. In the ED, he was severely hypercapnic and was intubated. He was admitted to the ICU and EEG was completed showing severe encephalopathy. He was also found to have purulent secretions with hypoxic airflow obstruction.   MRI completed on 3/21 (72 hour) showed severe anoxic encephalopathy. Minimal activity on EEG and he was unresponsive to painful stimuli despite being off sedation.  He was also treated for strep species/aspiration pneumonia.  A goals of care discussion was had on 3/23. Decision was made to revisit in one week as not all siblings were in agreement of how to proceed. A second goals of care discussion was had on 3/26. Family agreed to proceed with comfort care. However, this was delayed until 3/27/25 evening to allow for more family to arrive.  Mr. Rivera was compassionately extubated on 3/27/2025 at 1622 and officially transitioned to comfort care.      Out of hospital cardiac arrest  Transition to comfort care  Admitted for outside hospital cardiac arrest with prolonged down time on 3/18/2025. Determined to have severe anoxic brain injury with minimal activity on EEG. Family made the decision to transition to comfort care on the evening of 3/27/2025.  -GIP hospice consult  -Transfer to medical floor  -Discontinue all non-comfort  "based medications  -Discontinue vitals and bedside cardiac monitoring  -Discontinue all lab draws  -PRN hydromorphone  -PRN Versed for relief of air hunger  -PRN Robinul for oral secretions  -PRN Refresh-Plus for relief of dry eyes         Diet: NPO    DVT Prophylaxis: None indicated. Comfort care  Blount Catheter: PRESENT, indication: Acute retention or obstruction, End of life  Lines: PRESENT      PICC 03/19/25 Triple Lumen Right Brachial vein lateral iv med gtt's. PICC is ready for use.-Site Assessment: WDL      Cardiac Monitoring: ACTIVE order. Indication: ICU  Code Status: No CPR- Do NOT Intubate      Clinically Significant Risk Factors         # Hyponatremia: Lowest Na = 129 mmol/L in last 2 days, will monitor as appropriate  # Hypochloremia: Lowest Cl = 94 mmol/L in last 2 days, will monitor as appropriate      # Hypoalbuminemia: Lowest albumin = 2.6 g/dL at 3/26/2025 10:40 AM, will monitor as appropriate         # Acute Hypoxic Respiratory Failure: Documented O2 saturation < 90%. Continue supplemental oxygen as needed  # Acute Hypercapnic Respiratory Failure: based on venous blood gas results.  Continue supplemental oxygen and ventilatory support as indicated.         # Obesity: Estimated body mass index is 38.67 kg/m  as calculated from the following:    Height as of this encounter: 1.575 m (5' 2\").    Weight as of this encounter: 95.9 kg (211 lb 6.7 oz).   # Severe Malnutrition: based on nutrition assessment and treatment provided per dietitian's recommendations.         Social Drivers of Health    Depression: Unknown (8/11/2023)    Received from [facility]    Depression (PHQ-9)     Thoughts of self harm: Not at all   Tobacco Use: Medium Risk (2/11/2025)    Received from [facility]    Patient History     Smoking Tobacco Use: Former     Smokeless Tobacco Use: Never     Passive Exposure: Never   Interpersonal Safety: Unknown (3/19/2025)    Interpersonal Safety     " Do you feel physically and emotionally safe where you currently live?: Patient unable to answer     Within the past 12 months, have you been hit, slapped, kicked or otherwise physically hurt by someone?: Patient unable to answer     Within the past 12 months, have you been humiliated or emotionally abused in other ways by your partner or ex-partner?: Patient unable to answer          Disposition Plan   Medically Ready for Discharge: Anticipated in 2-4 Days     The patient's care was discussed with the Attending Physician, Dr. Hitchcock .    Guanako Cabrales NP  Hospitalist Service  [facility]  Securely message with [employer] (more info)  Text page via [facility] Paging/Directory   ______________________________________________________________________    Interval History   I personally examined Mr. Rivera on 3/28/2025. He was compassionate extubated on 3/27/2025 and officially transitioned to comfort care. Family is aware and were at bedside after extubation. No family present yet today. Plan to discontinue all vital signs, labs, non-comfort based procedures and monitoring. Comfort medications have been ordered.       Physical Exam   Vital Signs: Temp: 100.8  F (38.2  C) Temp src: Axillary BP: 104/67 Pulse: 63   Resp: 14 SpO2: 93 % O2 Device: Nasal cannula    Weight: 211 lbs 6.74 oz  Physical Exam  Vitals reviewed.   Cardiovascular:      Rate and Rhythm: Normal rate and regular rhythm.      Pulses: Normal pulses.      Heart sounds: Normal heart sounds.   Pulmonary:      Breath sounds: Rhonchi present.   Skin:     General: Skin is warm and dry.   Neurological:      Mental Status: He is unresponsive.     Medical Decision Making   60 MINUTES SPENT BY ME on the date of service doing chart review, history, exam, documentation & further activities per the note.      Data     I have personally reviewed the following data over the past 24 hrs:    13.4 (H)  \   9.4 (L)   / 430     138 101 24.8 (H) /   131 (H)   3.9 29 0.53 (L) \       Imaging results reviewed over the past 24 hrs:   No results found for this or any previous visit (from the past 24 hours).

## 2025-03-28 NOTE — PROGRESS NOTES
Care Management Follow Up    Length of Stay (days): 10    Expected Discharge Date: 03/28/2025     Concerns to be Addressed: decision making     Patient plan of care discussed at interdisciplinary rounds: Yes    Anticipated Discharge Disposition:               Anticipated Discharge Services:    Anticipated Discharge DME:      Patient/family educated on Medicare website which has current facility and service quality ratings:    Education Provided on the Discharge Plan:    Patient/Family in Agreement with the Plan: no    Referrals Placed by CM/SW:    Private pay costs discussed: Not applicable    Discussed  Partnership in Safe Discharge Planning  document with patient/family: No     Handoff Completed: No, handoff not indicated or clinically appropriate    Additional Information:  Per chart review, patient transitioned to comfort cares.    Next Steps: No further care management intervention anticipated at this time.  Please re-consult if further needs arise.  Care management signing off.       PATY Byrd RN, BSN, OCN   Inpatient Care Coordination ICU  [facility]  Office: [phone]

## 2025-03-28 NOTE — PROGRESS NOTES
No major changes overnight. Patient remains unresponsive and appears to be comfortable.  Comfort cares. PRN Robinul administered for secretions. Georgette UO. VSS. Patient's family updated via phone.

## 2025-03-28 NOTE — PROGRESS NOTES
Noted patient has changed to comfort care status.  Will be available if more aggressive nutrition intervention desired.  Corrine Guzman RD, LD, CNSC   Clinical Dietitian - [facility]

## 2025-03-29 VITALS
HEART RATE: 86 BPM | OXYGEN SATURATION: 81 % | BODY MASS INDEX: 38.91 KG/M2 | HEIGHT: 62 IN | RESPIRATION RATE: 26 BRPM | WEIGHT: 211.42 LBS | DIASTOLIC BLOOD PRESSURE: 67 MMHG | SYSTOLIC BLOOD PRESSURE: 104 MMHG | TEMPERATURE: 99.6 F

## 2025-03-29 PROCEDURE — 99207 PR NO BILLABLE SERVICE THIS VISIT: CPT | Performed by: STUDENT IN AN ORGANIZED HEALTH CARE EDUCATION/TRAINING PROGRAM

## 2025-03-29 PROCEDURE — 250N000011 HC RX IP 250 OP 636: Performed by: INTERNAL MEDICINE

## 2025-03-29 PROCEDURE — 250N000013 HC RX MED GY IP 250 OP 250 PS 637: Performed by: HOSPITALIST

## 2025-03-29 RX ORDER — ACETAMINOPHEN 650 MG/1
650 SUPPOSITORY RECTAL EVERY 4 HOURS PRN
Status: DISCONTINUED | OUTPATIENT
Start: 2025-03-29 | End: 2025-03-29 | Stop reason: HOSPADM

## 2025-03-29 RX ADMIN — GLYCOPYRROLATE 0.1 MG: 0.2 INJECTION, SOLUTION INTRAMUSCULAR; INTRAVENOUS at 01:52

## 2025-03-29 RX ADMIN — ACETAMINOPHEN 650 MG: 650 SUPPOSITORY RECTAL at 01:50

## 2025-03-29 ASSESSMENT — ACTIVITIES OF DAILY LIVING (ADL)
ADLS_ACUITY_SCORE: 85

## 2025-03-29 NOTE — DEATH PRONOUNCEMENT
[facility] DEATH PRONOUNCEMENT    Called to pronounce Miguel Guajardotman dead.    Physical Exam: Unresponsive to noxious stimuli, Spontaneous respirations absent, Breath sounds absent, Carotid pulse absent, and Heart sounds absent    Patient was pronounced dead at 03:40 AM, 2025.    Preliminary Cause of Death: severe anoxic brain injury following out of hospital cardiac arrest in the setting of comfort cares     Active Problems:    Anoxic brain damage (H)    Cardiopulmonary arrest (H)    Closed fracture of multiple ribs, unspecified laterality, initial encounter    Aspiration pneumonia (H)    Acute on chronic respiratory failure with hypoxia and hypercapnia (H)    COPD exacerbation (H)       Infectious disease present?: NO    Communicable disease present? (examples: HIV, chicken pox, TB, Ebola, CJD) :  NO    Multi-drug resistant organism present? (example: MRSA): NO    Please consider an autopsy if any of the following exist:  NO Unexpected or unexplained death during or following any dental, medical, or surgical diagnostic treatment procedures.   NO Death of mother at or up to seven days after delivery.     NO All  and pediatric deaths.     NO Death where the cause is sufficiently obscure to delay completion of the death certificate.   NO Deaths in which autopsy would confirm a suspected illness/condition that would affect surviving family members or recipients of transplanted organs.     The following deaths must be reported to the 's Office:  NO A death that may be due entirely or in part to any factors other than natural disease (recent surgery, recent trauma, suspected abuse/neglect).   NO A death that may be an accident, suicide, or homicide.     NO Any sudden, unexpected death in which there is no prior history of significant heart disease or any other condition associated with sudden death.   NO A death under suspicious, unusual, or unexpected circumstances.    NO Any death which is  apparently due to natural causes but in which the  does not have a personal physician familiar with the patient s medical history, social, or environmental situation or the circumstances of the terminal event.   NO Any death apparently due to Sudden Infant Death Syndrome.     NO Deaths that occur during, in association with, or as consequences of a diagnostic, therapeutic, or anesthetic procedure.   NO Any death in which a fracture of a major bone has occurred within the past (6) six months.   NO A death of persons note seen by their physician within 120 days of demise.     NO Any death in which the  was an inmate of a public institution or was in the custody of Law Enforcement personnel.   NO  All unexpected deaths of children   NO Solid organ donors   NO Unidentified bodies   UNKNOWN Deaths of persons whose bodies are to be cremated or otherwise disposed of so that the bodies will later be unavailable for examination;   NO Deaths unattended by a physician outside of a licensed healthcare facility or licensed residential hospice program   NO Deaths occurring within 24 hours of arrival to a health care facility if death is unexpected.    NO Deaths associated with the decedent s employment.   NO Deaths attributed to acts of terrorism.   NO Any death in which there is uncertainty as to whether it is a medical examiner s care should be discussed with the medical investigator.        Body disposition: Autopsy was discussed with family member:  Brother and Sister in person.  Permission for autopsy was obtained.    NATI Ashley [facility]  Securely message with the Voc[employer] Web Console (learn more here)  Text page via [employer] Paging/Directory

## 2025-03-29 NOTE — PROGRESS NOTES
BRIEF HOUSE NP NOTE:     Was called to the bedside to continue conversation started by my colleague, Tai Reynaga, CARLOS. Upon my arrival, patient with agonal respirations and continue upper airway secretions, despite recent administration of Robinul. Pupils fixed. Patient was determined to have severe anoxic brain injury with minimal activity on EEG. He was transitioned to comfort cares in the evening of 3/27.     Patient's brother, Martínez and his sister, Esha at bedside. Allowed both Martínez and Esha to ask questions and reiterated that unfortunately Miguel is near death and that a transfer to an OSH is both unsafe and ultimately futile. Patient's other brother, Ran, is distraught with the news of the patient's imminent death and feels that there would have been a different outcome had the patient been taken initially to another hospital or transfer to [facility]. I expressed empathy and acknowledged that he was feeling upset and angry. Esha was insistent that we provide them with a complete medical record from the patient's admission. I informed her that it was not possible for me to give that to her, and directed her to medical records. They requested additional medications be tried to reverse the patient's cerebral edema, I explained that the patient had transitioned to comfort cares and that any additional treatment would be medically futile at this time. I also reviewed the previous treatments, including mannitol and 3% NaCl that had already been given. During our conversation, it was requested that his code status be reversed. I explained to the family that in the event the patient's heart stopped, even immediate CPR and respiratory support would provide the patient with any meaningful life, should he regain a pulse. The family has requested an autopsy upon the patient's death.     NATI Ashley [facility]  Securely message with the Vocera Web Console (learn more  here)  Text page via [facility] Paging/Directory      Medical Decision Making       75 MINUTES SPENT BY ME on the date of service doing chart review, history, exam, documentation & further activities per the note.

## 2025-03-29 NOTE — PROGRESS NOTES
Patient time of death was 0340 on 3/29/25. Family notified. Autopsy requested Yes. Death record completed. Patient sent to [facility] at [phone]

## 2025-03-29 NOTE — DISCHARGE SUMMARY
[facility]    Death Summary - Hospitalist Service     Date of Admission:  3/18/2025  Date of Death: 3/29/2025  Discharging Provider: Sofía Pineda MD    Discharge Diagnoses   Anoxic brain damage (H)  Cardiopulmonary arrest (H)  Closed fracture of multiple ribs, unspecified laterality, initial encounter  Aspiration pneumonia (H)  Acute on chronic respiratory failure with hypoxia and hypercapnia (H)  COPD exacerbation (H)  CKD  HTN    Cause of death: Severe Anoxic Brain Injury following out of hospital cardiac arrest in the setting of [facility]     Hospital Course   Miguel Rivera is a 59 year old male with PMH of COPD, hypertension, kidney disease, and chronic hypercapnic and hypoxemic respiratory failure requiring home oxygen who presented on 3/18/2025 after an out of hospital cardiac arrest. On 3/18/25  he was found down by family with is oxygen disconnected. Actual down time is unknown, but last known well was at 1608 on day of admission and he was found around 1625. When EMS arrived, they found him to be in asystole. ROSC was ultimately achieved 30 minutes after EMS arrival. In the ED, he was severely hypercapnic and was intubated. He was admitted to the ICU and EEG was completed showing severe encephalopathy. He was also found to have purulent secretions with hypoxic airflow obstruction.  MRI completed on 3/21 (72 hour) showed severe anoxic encephalopathy. Minimal activity on EEG and he was unresponsive to painful stimuli despite being off sedation. He was also treated for strep species/aspiration pneumonia. A goals of care discussion was had on 3/23. Decision was made to revisit goals of care in one week as not all siblings were in agreement of how to proceed. A second goals of care discussion was had on 3/26/25 with ICU and Neurology teams. Family agreed to proceed with comfort care. However, this was delayed until 3/27/25 evening to allow for more family to arrive.   Miguel was compassionately extubated on 3/27/2025 at 1622 and officially transitioned to comfort care. Hospitalist team was contacted for transfer of care on 3/28/25.     After transfer out of the ICU, the family had requested patient be transferred to [facility] for a second opinion and requested additional medications be tried to reverse the patient's cerebral edema. House team graciously spent time with family discussing why this was not possible as patient had been transitioned to comfort care and that he was imminently dying. House team also went over the care the patient had received and what led to the care conference that occurred on 3/26/25. The family has requested an autopsy upon the patient's death.     Patient passed peacefully on 3/29/25 at 3:40am with family at bedside.      Out of hospital cardiac arrest  Transition to comfort care  Admitted for outside hospital cardiac arrest with prolonged down time on 3/18/2025. Determined to have severe anoxic brain injury with minimal activity on EEG. Family made the decision to transition to comfort care on the evening of 3/27/2025.  -[facility]IP hospice consult  -Transfer to medical floor  -Discontinue all non-comfort based medications  -Discontinue vitals and bedside cardiac monitoring  -Discontinue all lab draws  -PRN hydromorphone  -PRN Versed for relief of air hunger  -PRN Robinul for oral secretions  -PRN Refresh-Plus for relief of dry eyes       Sofía Pineda MD  [facility]  ______________________________________________________________________      Significant Results and Procedures   Results for orders placed or performed during the hospital encounter of 03/18/25   XR Chest Port 1 View    Narrative    EXAM: XR CHEST PORT 1 VIEW  LOCATION: [facility]  DATE: 3/18/2025    INDICATION: Post intubation.  COMPARISON: 1/24/2025.      Impression    IMPRESSION: Rosita is somewhat difficult to visualize in this  patient. Endotracheal tube tip is approximately 3-4 cm above the gregorio. Enteric tube tip below the diaphragm. Mild cardiac enlargement and mild increased pulmonary vascularity. No acute   appearing infiltrates or consolidation. Mild aortic calcification. No significant bony abnormalities.   CT Head w/o Contrast    Narrative    EXAM: CT HEAD W/O CONTRAST  LOCATION: [facility]  DATE: 3/18/2025    INDICATION: Follow-up post cardiac arrest  COMPARISON: CT head 4/13/2020  TECHNIQUE: Routine CT Head without IV contrast. Multiplanar reformats. Dose reduction techniques were used.    FINDINGS:  INTRACRANIAL CONTENTS: No intracranial hemorrhage, extraaxial collection, or mass effect.  Some loss of gray-white matter differentiation involving the bilateral basal ganglia, thalami, and potentially some additional loss of gray-white matter   differentiation involving the insular cortex compared to the previous CT. Minor low-attenuation within the periventricular white matter similar to the previous exam. Unchanged, age-appropriate ventricles and sulci.     VISUALIZED ORBITS/SINUSES/MASTOIDS: No intraorbital abnormality. Minor mucosal thickening involving the ethmoid septa. No middle ear or mastoid effusion.    BONES/SOFT TISSUES: No acute abnormality.      Impression    IMPRESSION:  1.  Some loss of gray-white matter differentiation involving the bilateral basal ganglia, thalami, and potentially the insular cortex compared to the previous exam. Hypoxic/ischemic injury in these locations is possible. The extent of ischemic injuries   would be more accurately evaluated by MRI.  2.  No hemorrhage, extra-axial collection, or mass effect.     CT Chest PE Abdomen Pelvis w Contrast     Value    Radiologist flags      New right upper lobe 1.1 cm nodule; consider PET/CT versus tissue sampling. Short focal narrowing of the ascending colon; consider colonoscopy.    Narrative    EXAM: CT CHEST PE ABDOMEN PELVIS W  CONTRAST  LOCATION: [facility]  DATE: 3/18/2025    INDICATION: Cardiac arrest.  COMPARISON: CTA chest 4/13/2020  TECHNIQUE: CT chest pulmonary angiogram and routine CT abdomen pelvis with IV contrast. Arterial phase through the chest and venous phase through the abdomen and pelvis. Multiplanar reformats and MIP reconstructions were performed. Dose reduction   techniques were used.   CONTRAST: 114 mL Isovue 370    FINDINGS:  ANGIOGRAM CHEST: Pulmonary arteries are normal caliber and negative for pulmonary emboli. Mild aortic calcifications. Thoracic aorta is negative for dissection. No CT evidence of right heart strain.     LUNGS AND PLEURA: Confluent opacities in the bilateral lower lobes, with associated volume loss, most likely atelectasis. No focal airspace disease. Moderate upper lobe predominant centrilobular emphysema. Redemonstrated varicoid bronchiectasis in the   right middle lobe and right lower lobe. New right upper lobe solid nodule measuring 1.1 x 1.0 cm (series 6, image 61). Small bilateral pleural effusions. No pneumothorax.    MEDIASTINUM/AXILLAE: Enlarged right paratracheal lymph nodes, measuring up to 1.5 cm in short axis, not significantly changed since 2020. Few borderline enlarged paraortic lymph nodes, likely reactive. No additional lymphadenopathy. Cardiomegaly. No   pericardial effusion. Enteric tube coursing through the esophagus.    CORONARY ARTERY CALCIFICATION: Moderate.    HEPATOBILIARY: Normal.    PANCREAS: Normal.    SPLEEN: Normal.    ADRENAL GLANDS: Normal.    KIDNEYS/BLADDER: Few low-attenuation lesions in both kidneys, with the larger ones compatible with simple renal cysts for which no follow-up is indicated and the smaller ones too small to characterize. No hydronephrosis. Urinary bladder appears   unremarkable.    BOWEL: Enteric tube with tip in the stomach. Short focal narrowing of the ascending colon, measuring 2.8 cm in length (series 13, image 40).  No bowel obstruction or evidence of bowel inflammation. Small bowel appears normal. Normal appendix. No evidence   of acute appendicitis.    LYMPH NODES: No lymphadenopathy.    VASCULATURE: Moderate atherosclerotic calcifications.    PELVIC ORGANS: Prostatic calcifications.    MUSCULOSKELETAL: Acute mildly displaced right anterior fourth rib fracture. Acute minimally displaced left anterior second rib fracture. Mildly angulated right anterior second, third, fifth, sixth, seventh, and eighth rib fractures, likely acute. Mildly   angulated left anterior third rib fracture, likely acute. Multilevel degenerative changes of the spine. Small fat-containing periumbilical hernia. Small fat-containing left inguinal hernia.      Impression    IMPRESSION:  1.  No acute pulmonary embolism.    2.  Small bilateral pleural effusions. Confluent bilateral lower lobe opacities, with associated volume loss, most likely atelectasis.    3.  Acute bilateral rib fractures. No pneumothorax.    4.  New right upper lobe 1.1 cm nodule. Consider PET/CT versus tissue sampling.    5.  No acute findings in the abdomen or pelvis.    6.  Short 2.8 cm focal narrowing of the ascending colon, possibly peristalsis, although underlying mass not excluded. Consider correlation with colonoscopy.        [Consider Follow Up: New right upper lobe 1.1 cm nodule; consider PET/CT versus tissue sampling. Short focal narrowing of the ascending colon; consider colonoscopy.]    This report will be copied to the [facility] to ensure a provider acknowledges the finding.      XR Chest Port 1 View    Narrative    EXAM: XR CHEST PORT 1 VIEW  LOCATION: [facility]  DATE: 3/18/2025    INDICATION: Status post cardiac arrest, intubation, history of COPD.  COMPARISON: Chest CT from hours earlier.      Impression    IMPRESSION:   1.  ET tube 4 cm above gregorio.  2.  NG tube in mid stomach.  3.  Emphysema with bilateral lower lobe atelectasis  and small pleural effusions.  4.  Cardiomegaly.  5.  No evidence of pneumothorax (from CPR chest compressions).   MR Brain w/o & w Contrast    Narrative    EXAM: MR BRAIN W/O and W CONTRAST  LOCATION: [facility]  DATE: 3/21/2025    INDICATION: Anoxic brain injury  COMPARISON: Head CT 3/18/2025  CONTRAST: 9 ml Gadavist  TECHNIQUE: Routine multiplanar multisequence head MRI without and with intravenous contrast.    FINDINGS:  INTRACRANIAL CONTENTS: Diffuse abnormal restricted diffusion involving the supratentorial cortex. Additional involvement in the basal ganglia. There is associated FLAIR signal hyperintensity. There is gyral swelling resulting in some sulcal effacement.   No shift of midline structures or sellar tonsillar herniation. No intracranial hemorrhage or pathologic enhancement.    SELLA: No abnormality accounting for technique.    OSSEOUS STRUCTURES/SOFT TISSUES: Normal marrow signal. The major intracranial vascular flow voids are maintained.     ORBITS: No abnormality accounting for technique.     SINUSES/MASTOIDS: Diffuse mucosal thickening. Complete/near complete opacification of the mastoid air cells bilaterally. No apparent mass in the posterior nasopharynx or skull base.       Impression    IMPRESSION:  1.  Evidence of diffuse anoxic brain injury.  2.  No intracranial hemorrhage.             US Upper Extremity Venous Duplex Right    Narrative    EXAM: US UPPER EXTREMITY VENOUS DUPLEX RIGHT  LOCATION: [facility]  DATE: 3/23/2025    INDICATION: Swollen right arm, same as PICC line   assess for DVT  COMPARISON: None.  TECHNIQUE: Venous Duplex ultrasound of the right upper extremity with (when possible) and without compression, augmentation, and duplex. Color flow and spectral Doppler with waveform analysis performed.    FINDINGS: Ultrasound includes evaluation of the internal jugular vein, innominate vein, subclavian vein, axillary vein, and brachial  vein. The superficial cephalic and basilic veins were also evaluated where seen.     RIGHT: No deep venous thrombosis. Occlusive thrombus in a segment of the cephalic vein at the antecubital fossa and distal forearm. This is at the site of prior IV insertion. A PICC line is noted in the brachial vein. Moderate subcutaneous edema in the   antecubital fossa.      Impression    IMPRESSION:  1.  No deep venous thrombosis in the right upper extremity.  2.  Acute occlusive superficial thrombophlebitis involving the right cephalic vein at the antecubital fossa and distal forearm.   XR Abdomen Port 1 View    Narrative    EXAM: XR ABDOMEN PORT 1 VIEW  LOCATION: [facility]  DATE: 3/23/2025    INDICATION: og tube placement  COMPARISON: None.      Impression    IMPRESSION: Enteric tube within the stomach. Nonobstructive bowel gas pattern.   XR Chest Port 1 View    Narrative    EXAM: XR CHEST PORT 1 VIEW  LOCATION: [facility]  DATE: 3/24/2025    INDICATION: hypoxia increased WOB  COMPARISON: 03/18/2025.       Impression    IMPRESSION: Endotracheal tube terminates approximately 7 cm above the level of the gregorio. Right upper extremity PICC line with tip overlying the mid aspect of the SVC. Nasogastric tube courses below the diaphragm and into the stomach though the tip is   excluded by collimation. Esophageal temperature probe overlies the upper thoracic esophagus. Worsening near-complete opacification of the left hemithorax. This could be due to mucous plugging with associated volume loss, and there is of note a somewhat a   abrupt termination of the left mainstem bronchus and there is some leftward mediastinal shift. Suspect also a moderate to large left pleural effusion. Slightly aerated left upper lung zone with diffusely increased interstitial markings, may reflect   asymmetric edema. Small right pleural effusion. Unchanged osseous structures.   XR Chest Port 1 View     Narrative    EXAM: XR CHEST PORT 1 VIEW  LOCATION: [facility]  DATE: 3/24/2025    INDICATION: s p bronch, mucus pluggining  COMPARISON: X-ray 3/24/2025 at 1:24 AM      Impression    IMPRESSION: Endotracheal tube tip located 5.5 cm above the gregorio. Enteric tube with the distal visualized portion in the gastric lumen. Right PICC line with the tip at the cavoatrial junction. Improved small left-sided pleural effusion and markedly   improved aeration within the left lung. Trace right-sided pleural effusion with adjacent atelectasis. Stable heart size. Mild central vascular congestion.   XR Chest Port 1 View    Narrative    EXAM: XR CHEST PORT 1 VIEW  LOCATION: [facility]  DATE: 03/24/2025    INDICATION: Advanced ETT, s/p bronch, feeding tube location.  COMPARISON: 03/24/2025 at 0840 hours.      Impression    IMPRESSION: Endotracheal tube tip 4.5 cm above the gregorio. Right PICC line tip in SVC. Consolidation in the left lower lobe behind the heart with small left pleural effusion stable. Right basilar atelectasis stable.   XR Abdomen Port 1 View    Narrative    EXAM: XR ABDOMEN PORT 1 VIEW  LOCATION: [facility]  DATE: 3/24/2025    INDICATION: OG location  COMPARISON: None.      Impression    IMPRESSION: OG tube in good position in the stomach.   XR Abdomen Port 1 View    Narrative    EXAM: XR ABDOMEN PORT 1 VIEW  LOCATION: [facility]  DATE: 3/25/2025    INDICATION: New nasaogastric tube insertion  COMPARISON: 03/24/2025      Impression    IMPRESSION:   1. Gastric drainage tube with the tip and sidehole in the stomach.   CT Head w/o Contrast    Narrative    EXAM: CT HEAD W/O CONTRAST  LOCATION: [facility]  DATE: 3/26/2025    INDICATION: Patient with anoxic brain injury with new fixed dilated pupil.    COMPARISON: MRI of the brain 3/21/2025. CT of the head 3/18/2025.    TECHNIQUE: Routine CT  head without intravenous contrast. Multiplanar reformats. Dose reduction techniques were used.    FINDINGS:  INTRACRANIAL CONTENTS: The exam is somewhat limited by motion artifact. Interval progression of severe diffuse cerebral swelling with diffuse sulcal effacement and moderate to marked narrowing of the bilateral lateral ventricles and complete effacement   of the third ventricle. Significant narrowing of the suprasellar cistern and findings raising concern for early downward transtentorial herniation with effacement of the ambient and interpeduncular cisterns and at least some apparent mass effect of the   bilateral cerebral peduncles.    New on this exam compared to prior CT there is diffuse subtle hyperattenuation throughout the cerebral sulci, the interhemispheric fissure region, the suprasellar cistern, bilateral sylvian cisterns, and the ambient cisterns. This is favored to represent   pseudosubarachnoid hemorrhage/artifact rather than true diffuse subarachnoid hemorrhage, but is not entirely specific. Mildly low-lying cerebellar tonsils.    VISUALIZED ORBITS/SINUSES/MASTOIDS: No intraorbital abnormality. No paranasal sinus mucosal disease. No middle ear or mastoid effusion.    BONES/SOFT TISSUES: No acute abnormality. The patient is intubated and a nasoenteric tube is partially visualized extending through the right nasal cavity into the pharynx. These devices are incompletely evaluated. There are secretions in the pharynx.   Mucosal thickening and secretions in the right maxillary and left greater than right sphenoid sinuses and mild to moderate left posterior ethmoid sinus mucosal thickening. Unchanged chronic fracture deformity of the left lamina papyracea. Moderate to   marked bilateral mastoid effusions with partial opacification of the middle ear cavities, new/increased compared to the most recent CT.      Impression    IMPRESSION:  1.  Interval progression of severe diffuse cerebral swelling with  sulcal effacement and moderate to marked narrowing of the bilateral lateral ventricles and complete effacement of the third ventricle. Significant narrowing of the suprasellar cistern and   findings raising concern for early downward transtentorial herniation.  2.  Diffuse subtle hyperattenuation throughout the cerebral sulci, the interhemispheric fissure region, the suprasellar cistern, bilateral sylvian cisterns, and the ambient cisterns. This is favored to represent pseudosubarachnoid hemorrhage/artifact   rather than true diffuse subarachnoid hemorrhage.  3.  New/increased moderate to marked bilateral mastoid effusions with partial opacification of the middle ear cavities.    Findings were discussed with the critical care physician caring for the patient by myself by phone at 8:50 AM Central time 2025.   Echocardiogram Complete     Value    LVEF  55-60%    Narrative    [id number]  [id number]  [id number]  [id number]^DEEPIKA^SOURAV     [facility]  Echocardiography Laboratory  [address] [place] [address]     Name: ANGEL PENA  MRN: [id number]  : 1965  Study Date: 2025 11:20 PM  Age: 59 yrs  Gender: Male  [facility]  Reason For Study: Cardiac Arrest  Ordering Physician: SOURAV POE  Performed By: Ramin Walker     BSA: 2.0 m2  Height: 62 in  Weight: 230 lb  HR: 77  BP: 134/87 mmHg  ______________________________________________________________________________  Procedure  Echocardiogram with two-dimensional, color and spectral Doppler. Definity (NDC  #32901-645) given intravenously.  ______________________________________________________________________________  Interpretation Summary     The left ventricle is normal in size.  Left ventricular systolic function is normal.  The visual ejection fraction is 55-60%.  Normal left ventricular wall motion  The right ventricle is normal in structure, function and size.  Mild valvular aortic  stenosis. The mean AoV pressure gradient is 13mmHg.  The rhythm was atrial fibrillation.  There is no comparison study available.  ______________________________________________________________________________  Left Ventricle  The left ventricle is normal in size. There is normal left ventricular wall  thickness. Left ventricular systolic function is normal. The visual ejection  fraction is 55-60%. Diastolic Doppler findings (E/E' ratio and/or other  parameters) suggest left ventricular filling pressures are indeterminate.  Normal left ventricular wall motion.     Right Ventricle  The right ventricle is normal in structure, function and size.     Atria  Normal left atrial size. Right atrial size is normal. There is no atrial shunt  seen.     Mitral Valve  The mitral valve is normal in structure and function. There is trace mitral  regurgitation.     Tricuspid Valve  The tricuspid valve is normal in structure and function. There is trace  tricuspid regurgitation. Right ventricular systolic pressure could not be  approximated due to inadequate tricuspid regurgitation.     Aortic Valve  There is trace aortic regurgitation. Mild valvular aortic stenosis. The mean  AoV pressure gradient is 13mmHg.     Pulmonic Valve  The pulmonic valve is not well seen, but is grossly normal. There is trace  pulmonic valvular regurgitation.     Vessels  Normal size aorta.     Pericardium  Trivial posterior pericardial effusion.     Rhythm  The rhythm was atrial fibrillation.  ______________________________________________________________________________  MMode/2D Measurements & Calculations     IVSd: 1.1 cm  LVIDd: 5.1 cm  LVIDs: 2.9 cm  LVPWd: 1.2 cm  FS: 43.1 %  LV mass(C)d: 227.4 grams  LV mass(C)dI: 112.1 grams/m2  Ao root diam: 3.0 cm  asc Aorta Diam: 3.4 cm  LVOT diam: 2.2 cm  LVOT area: 3.8 cm2  Ao root diam index Ht(cm/m): 1.9  Ao root diam index BSA (cm/m2): 1.5  Asc Ao diam index BSA (cm/m2): 1.7  Asc Ao diam index Ht(cm/m):  2.2  LA Volume (BP): 53.4 ml     LA Volume Index (BP): 26.3 ml/m2  RV Base: 4.5 cm  RWT: 0.47  TAPSE: 2.0 cm     Doppler Measurements & Calculations  MV E max wally: 92.2 cm/sec  MV dec slope: 289.0 cm/sec2  MV dec time: 0.32 sec  Ao V2 max: 220.0 cm/sec  Ao max P.0 mmHg  Ao V2 mean: 171.0 cm/sec  Ao mean P.0 mmHg  Ao V2 VTI: 39.5 cm  ROBERT(I,D): 2.0 cm2  ROBERT(V,D): 2.1 cm2  LV V1 max P.9 mmHg  LV V1 max: 121.0 cm/sec  LV V1 VTI: 20.3 cm  SV(LVOT): 77.2 ml  SI(LVOT): 38.0 ml/m2  PA acc time: 0.09 sec  AV Wally Ratio (DI): 0.55  ROBERT Index (cm2/m2): 0.96     E/E' av.1  Lateral E/e': 9.0  Medial E/e': 9.2  RV S Wally: 12.9 cm/sec     ______________________________________________________________________________  Report approved by: Olivier Garcia MD on 2025 01:27 AM             Consultations This Hospital Stay   PHYSICAL THERAPY ADULT IP CONSULT  OCCUPATIONAL THERAPY ADULT IP CONSULT  WOUND OSTOMY CONTINENCE NURSE  IP CONSULT  VASCULAR ACCESS ADULT IP CONSULT  NEUROLOGY CRITICAL CARE ADULT IP CONSULT  SPIRITUAL HEALTH SERVICES IP CONSULT  NUTRITION SERVICES ADULT IP CONSULT  CARE MANAGEMENT / SOCIAL WORK IP CONSULT  PHARMACY IP CONSULT  CARE MANAGEMENT / SOCIAL WORK IP CONSULT  VASCULAR ACCESS ADULT IP CONSULT  [facility] INPATIENT HOSPICE ADULT CONSULT    Primary Care Physician   Physician No Ref-Primary    Time Spent on this Encounter   I, Sofía Pineda MD, discharged this patient today but I did not personally see the patient today and will not be billing for the patient's discharge.

## 2025-03-29 NOTE — PLAN OF CARE
Orientation: KAYDEN, unresponsive   Aggression Stop Light: Green  Activity: A2 lift, T/R  Diet/BS Checks: NPO. Oral cares PRN   Tele: N/A  IV Access/Drains: R PICC SL  Pain Management: Dilaudid given x1. Robinul given x2   Abnormal VS/Results: Deferred   2L O2 for comfort   Bowel/Bladder: Blount in place, incont of BM  Skin/Wounds:   Consults: KATIE  D/C Disposition: Will pass in hospital     Other Info:   -ICU transfer this afternoon   -Comfort cares maintained, see house NP notes from this evening

## 2025-03-29 NOTE — CONSULTS
[facility]    Consult Note - [facility]  _________________________________________________________________    [facility] 24/7 Contact Number: [phone]    - Providers: Please contact [facility] with changes in orders or clinical plan of care   - Nursing: Please contact [facility] with significant changes in patient condition    Hospice will notify the care team (including the hospitalist) to confirm date of inpatient hospice (GIP) admission.    New Epic encounter will not be created until hospice completes admission.   ______________________________________________________________________        Summary of Visit (includes assessment, medications and any new orders):     Upon my arrival pt was surrounded by his brother Martínez and sister Esha. I had a discussion with them about what hospice can offer them and they shared they are wondering about getting a second opinion to see if it would be possible for him to live.   The hospitalist and nurse practitioner arrived and explained that pt was not going to recover.   Family declined hospice at this time because they were feeling exhausted after long conversations with their care team and family is still coming to terms with pt's imminent death.     Plan: Hospice RN will plan to follow up tomorrow in case family could use support.     Carol Flores MD (Flaharty)iv  She/Her/Hers    What's Trending   Mobile: [phone]  [email or web address]  [email or web address]

## 2025-03-31 ENCOUNTER — TELEPHONE (OUTPATIENT)
Dept: PULMONOLOGY | Facility: CLINIC | Age: 60
End: 2025-03-31

## 2025-03-31 NOTE — TELEPHONE ENCOUNTER
[facility] Call Center    Phone Message    May a detailed message be left on voicemail: yes leave VM @ [phone], incase no one answers at the ph: [phone]     Reason for Call: Other: Michelet from pathology is calling to speak to Dr Schwartz about consent for autopsy that provider signed in regards to who provider spoke to for consent. Please call Michelet back at ph: [phone] if there is no answer please call ph: [phone] to leave a VM.      Action Taken: Message routed to:  [facility] Pulm    Travel Screening: Not Applicable     Date of Service: 3/31

## 2025-04-03 ENCOUNTER — LAB REQUISITION (OUTPATIENT)
Dept: LAB | Facility: CLINIC | Age: 60
End: 2025-04-03

## 2025-04-08 LAB
BACTERIA BLD CULT: NO GROWTH
BACTERIA TISS BX CULT: NO GROWTH

## 2025-04-10 LAB
BACTERIA TISS BX CULT: NORMAL
BACTERIA TISS BX CULT: NORMAL

## 2025-04-17 LAB — BACTERIA TISS BX CULT: NORMAL

## 2025-04-24 LAB — BACTERIA TISS BX CULT: NORMAL

## 2025-05-01 LAB — BACTERIA TISS BX CULT: NO GROWTH

## 2025-05-19 LAB
PATH FINDINGS: NORMAL
PATH REPORT.COMMENTS IMP SPEC: NORMAL
PATH REPORT.FINAL DX SPEC: NORMAL
PATH REPORT.FINAL DX SPEC: NORMAL
PATH REPORT.GROSS SPEC: NORMAL
PATH REPORT.MICROSCOPIC SPEC OTHER STN: NORMAL
PATH REPORT.RELEVANT HX SPEC: NORMAL
PATH REPORT.SITE OF ORIGIN SPEC: NORMAL